# Patient Record
Sex: MALE | Employment: OTHER | URBAN - METROPOLITAN AREA
[De-identification: names, ages, dates, MRNs, and addresses within clinical notes are randomized per-mention and may not be internally consistent; named-entity substitution may affect disease eponyms.]

---

## 2022-11-09 ENCOUNTER — OFFICE VISIT (OUTPATIENT)
Dept: URGENT CARE | Facility: CLINIC | Age: 67
End: 2022-11-09

## 2022-11-09 DIAGNOSIS — M70.51 INFRAPATELLAR BURSITIS OF RIGHT KNEE: Primary | ICD-10-CM

## 2022-11-09 RX ORDER — PNV NO.95/FERROUS FUM/FOLIC AC 28MG-0.8MG
2 TABLET ORAL DAILY
COMMUNITY

## 2022-11-09 RX ORDER — SILDENAFIL 100 MG/1
TABLET, FILM COATED ORAL
COMMUNITY
Start: 2022-08-23

## 2022-11-09 RX ORDER — COLCHICINE 0.6 MG/1
TABLET ORAL
COMMUNITY
Start: 2022-08-31

## 2022-11-09 RX ORDER — UBIQUINOL 100 MG
2 CAPSULE ORAL DAILY
COMMUNITY

## 2022-11-09 RX ORDER — METAXALONE 800 MG/1
800 TABLET ORAL 3 TIMES DAILY
COMMUNITY

## 2022-11-09 RX ORDER — MELOXICAM 7.5 MG/1
7.5 TABLET ORAL DAILY
COMMUNITY

## 2022-11-09 RX ORDER — SODIUM PICOSULFATE, MAGNESIUM OXIDE, AND ANHYDROUS CITRIC ACID 10; 3.5; 12 MG/160ML; G/160ML; G/160ML
LIQUID ORAL
COMMUNITY
Start: 2022-11-04

## 2022-11-09 NOTE — PATIENT INSTRUCTIONS
Knee Bursitis   AMBULATORY CARE:   Knee bursitis  is inflammation of the bursa in your knee  The bursa is a fluid-filled sac that acts as a cushion between a bone and a tendon  A tendon is a cord of strong tissue that connects muscles to bones  Common signs and symptoms of knee bursitis:   Pain, swelling, or tenderness in your knee    Decreased movement or stiffness of your knee    Red, warm, skin over your knee    A grating or grinding sound or feeling when you move your knee    Call your doctor if:   Your pain and swelling increase  Your symptoms do not improve with treatment  You have a fever  You have questions or concerns about your condition or care  Treatment  may include any of the following:  Medicines:      NSAIDs , such as ibuprofen, help decrease swelling, pain, and fever  NSAIDs can cause stomach bleeding or kidney problems in certain people  If you take blood thinner medicine, always ask your healthcare provider if NSAIDs are safe for you  Always read the medicine label and follow directions  Aspirin  helps relieve pain and swelling  Take aspirin exactly as directed by your healthcare provider  Antibiotics  help fight an infection caused by bacteria  Steroids  help relieve pain and swelling  Steroid injections are given directly into the painful area  Steroid pills may be given for a short time  Surgery  may be used to remove your bursa  Surgery is only done when other treatments do not work  Manage your symptoms:   Rest your knee as much as possible to decrease pain and swelling  Slowly start to do more each day  Return to your daily activities as directed  Apply ice to help decrease swelling and pain  Ice may also help prevent tissue damage  Use an ice pack, or put crushed ice in a plastic bag  Cover it with a towel and place it on your knee for 15 to 20 minutes, 3 to 4 times each day, as directed  Apply heat to help decrease pain and stiffness    Apply heat on the area for 15 to 20 minutes, 3 to 4 times each day, as directed  Apply compression to decrease swelling  Healthcare providers may wrap your knee with tape or an elastic bandage to decrease swelling  Loosen the elastic bandage if you start to lose feeling in your toes  Elevate  your knee above the level of your heart as often as you can  This will help decrease swelling and pain  Prop your lower leg on pillows or blankets to keep it elevated comfortably  Do not put the pillow directly under your knee  Go to physical therapy, if directed  A physical therapist can teach you exercises to improve your range of motion and increase knee strength  Prevent knee bursitis:   Stretch, warm up, and cool down when you exercise  This will help loosen your muscles and decrease stress on your knees  Rest between workouts  Protect your knees  Use kneepads when you kneel on a hard surface and when you play sports  Stand and walk around every 20 minutes if you have to kneel for a long period of time  Follow up with your doctor as directed:  Write down your questions so you remember to ask them during your visits  © Copyright [employer] 2022 Information is for End User's use only and may not be sold, redistributed or otherwise used for commercial purposes  All illustrations and images included in CareNotes® are the copyrighted property of A D A M , Inc  or [facility] Mar[employer] Daniels   The above information is an  only  It is not intended as medical advice for individual conditions or treatments  Talk to your doctor, nurse or pharmacist before following any medical regimen to see if it is safe and effective for you

## 2022-11-09 NOTE — PROGRESS NOTES
[email or web address]    NAME: Donnie Cross is a 78 y o  male  : 1955    MRN: [id number]  DATE: 2022  TIME: 10:10 AM    Assessment and Plan   Infrapatellar bursitis of right knee [M70 51]  1  Infrapatellar bursitis of right knee         Patient Instructions   Patient Instructions     Knee Bursitis   AMBULATORY CARE:   Knee bursitis  is inflammation of the bursa in your knee  The bursa is a fluid-filled sac that acts as a cushion between a bone and a tendon  A tendon is a cord of strong tissue that connects muscles to bones  Common signs and symptoms of knee bursitis:   · Pain, swelling, or tenderness in your knee    · Decreased movement or stiffness of your knee    · Red, warm, skin over your knee    · A grating or grinding sound or feeling when you move your knee    Call your doctor if:   · Your pain and swelling increase  · Your symptoms do not improve with treatment  · You have a fever  · You have questions or concerns about your condition or care  Treatment  may include any of the followin  Medicines:      ? NSAIDs , such as ibuprofen, help decrease swelling, pain, and fever  NSAIDs can cause stomach bleeding or kidney problems in certain people  If you take blood thinner medicine, always ask your healthcare provider if NSAIDs are safe for you  Always read the medicine label and follow directions  ? Aspirin  helps relieve pain and swelling  Take aspirin exactly as directed by your healthcare provider  ? Antibiotics  help fight an infection caused by bacteria  ? Steroids  help relieve pain and swelling  Steroid injections are given directly into the painful area  Steroid pills may be given for a short time  2  Surgery  may be used to remove your bursa  Surgery is only done when other treatments do not work  Manage your symptoms:   · Rest your knee as much as possible to decrease pain and swelling  Slowly start to do more each day   Return to your daily activities as directed  · Apply ice to help decrease swelling and pain  Ice may also help prevent tissue damage  Use an ice pack, or put crushed ice in a plastic bag  Cover it with a towel and place it on your knee for 15 to 20 minutes, 3 to 4 times each day, as directed  · Apply heat to help decrease pain and stiffness  Apply heat on the area for 15 to 20 minutes, 3 to 4 times each day, as directed  · Apply compression to decrease swelling  Healthcare providers may wrap your knee with tape or an elastic bandage to decrease swelling  Loosen the elastic bandage if you start to lose feeling in your toes  · Elevate  your knee above the level of your heart as often as you can  This will help decrease swelling and pain  Prop your lower leg on pillows or blankets to keep it elevated comfortably  Do not put the pillow directly under your knee  · Go to physical therapy, if directed  A physical therapist can teach you exercises to improve your range of motion and increase knee strength  Prevent knee bursitis:   · Stretch, warm up, and cool down when you exercise  This will help loosen your muscles and decrease stress on your knees  Rest between workouts  · Protect your knees  Use kneepads when you kneel on a hard surface and when you play sports  Stand and walk around every 20 minutes if you have to kneel for a long period of time  Follow up with your doctor as directed:  Write down your questions so you remember to ask them during your visits  © Copyright [employer] 2022 Information is for End User's use only and may not be sold, redistributed or otherwise used for commercial purposes  All illustrations and images included in CareNotes® are the copyrighted property of [employer]  or Jose Antonio Daniels   The above information is an  only  It is not intended as medical advice for individual conditions or treatments   Talk to your doctor, nurse or pharmacist before following any medical regimen to see if it is safe and effective for you  Chief Complaint   No chief complaint on file  History of Present Illness   Corey Escoto presents to the clinic c/o  62-year-old male comes in with swelling pain limited range of motion of right knee that started on Sunday  Denies any known injury but says Saturday he did his regular workout, lots of yd work and then went and did a lot a walking at [employer] in [place] in  He had similar symptoms about a year ago and was treated with meloxicam   Says he does not like that medication and brought in for rested dispose of  No fever or chills  Taking 2 Advil every 4-6 hours as needed  Seems to be a little bit better since Monday  Review of Systems   Review of Systems   Constitutional: Positive for activity change  Negative for appetite change, chills and fever  Musculoskeletal: Positive for arthralgias, gait problem and joint swelling  Skin: Negative for color change         Current Medications     Long-Term Medications   Medication Sig Dispense Refill   • meloxicam (MOBIC) 7 5 mg tablet Take 7 5 mg by mouth daily     • sildenafil (VIAGRA) 100 mg tablet TAKE ONE-HALF TO ONE TABLET BY MOUTH 30 TO 60 MINUTES PRIOR TO SEX AS NEEDED     • colchicine (COLCRYS) 0 6 mg tablet TAKE 1 TABLET BY MOUTH 3 TIMES A DAY FOR 7 DAYS AS NEEDED FOR GOUT FLARE (Patient not taking: Reported on 11/9/2022)     • metaxalone (SKELAXIN) 800 mg tablet Take 800 mg by mouth Three times a day (Patient not taking: Reported on 11/9/2022)         Current Allergies     Allergies as of 11/09/2022 - Reviewed 11/09/2022   Allergen Reaction Noted   • Clindamycin GI Intolerance 02/17/2015          The following portions of the patient's history were reviewed and updated as appropriate: allergies, current medications, past family history, past medical history, past social history, past surgical history and problem list   Past Medical History:   Diagnosis Date   • Erectile dysfunction    • Knee pain, right    • Renal calculi      History reviewed  No pertinent surgical history  No family history on file  Objective   There were no vitals taken for this visit  No LMP for male patient  Physical Exam     Physical Exam  Vitals and nursing note reviewed  Constitutional:       General: He is not in acute distress  Appearance: Normal appearance  He is well-developed  He is not ill-appearing, toxic-appearing or diaphoretic  Cardiovascular:      Rate and Rhythm: Normal rate and regular rhythm  Pulmonary:      Effort: Pulmonary effort is normal    Musculoskeletal:         General: Swelling and tenderness present  No deformity or signs of injury  Right knee: Swelling and bony tenderness present  No deformity, effusion, erythema, ecchymosis, lacerations or crepitus  Normal range of motion  No tenderness  Normal alignment, normal meniscus and normal patellar mobility  Legs:       Comments: Swelling infrapatellar bursa region with mild TTP  No redness or heat  Pain increased with and flexion and and extension  Gait fairly normal    Skin:     General: Skin is warm and dry  Neurological:      Mental Status: He is alert and oriented to person, place, and time

## 2022-11-21 ENCOUNTER — OFFICE VISIT (OUTPATIENT)
Dept: URGENT CARE | Facility: CLINIC | Age: 67
End: 2022-11-21

## 2022-11-21 VITALS
OXYGEN SATURATION: 97 % | TEMPERATURE: 97.2 F | RESPIRATION RATE: 18 BRPM | SYSTOLIC BLOOD PRESSURE: 124 MMHG | DIASTOLIC BLOOD PRESSURE: 72 MMHG | HEART RATE: 72 BPM

## 2022-11-21 DIAGNOSIS — J01.00 ACUTE NON-RECURRENT MAXILLARY SINUSITIS: Primary | ICD-10-CM

## 2022-11-21 RX ORDER — PREDNISONE 20 MG/1
20 TABLET ORAL 2 TIMES DAILY WITH MEALS
Qty: 10 TABLET | Refills: 0 | Status: SHIPPED | OUTPATIENT
Start: 2022-11-21 | End: 2022-11-26

## 2022-11-21 NOTE — PROGRESS NOTES
[facility]        NAME: Christin Beltrán is a 78 y o  male  : 1955    MRN: [id number]  DATE: 2022  TIME: 10:32 AM    Assessment and Plan   Acute non-recurrent maxillary sinusitis [J01 00]  1  Acute non-recurrent maxillary sinusitis          May continue tylenol cold -   Take advil as needed for breakthough headache   Trial course of prednisone   F/u with pcp    Patient Instructions     Follow up with PCP in 3-5 days  Proceed to  ER if symptoms worsen  Chief Complaint     Chief Complaint   Patient presents with   • Cold Like Symptoms     Patient c/o stuffy nose and head  and HA for 6 days         History of Present Illness   Christin Beltrán presents to the clinic c/o    Cold Like Symptoms (Patient c/o stuffy nose and head  and HA for 5- 6 days)  Taking tylenol cold   Tylenol does not help with his headache at all   Does help a little with the congestion, but wears off and symptoms return  Super congested in the morning - after able to get it all out feels a little better but not as good as he would like  Review of Systems   Review of Systems   All other systems reviewed and are negative          Current Medications     Long-Term Medications   Medication Sig Dispense Refill   • meloxicam (MOBIC) 7 5 mg tablet Take 7 5 mg by mouth daily     • sildenafil (VIAGRA) 100 mg tablet TAKE ONE-HALF TO ONE TABLET BY MOUTH 30 TO 60 MINUTES PRIOR TO SEX AS NEEDED     • colchicine (COLCRYS) 0 6 mg tablet TAKE 1 TABLET BY MOUTH 3 TIMES A DAY FOR 7 DAYS AS NEEDED FOR GOUT FLARE (Patient not taking: Reported on 2022)     • metaxalone (SKELAXIN) 800 mg tablet Take 800 mg by mouth Three times a day (Patient not taking: Reported on 2022)         Current Allergies     Allergies as of 2022 - Reviewed 2022   Allergen Reaction Noted   • Clindamycin GI Intolerance 2015            The following portions of the patient's history were reviewed and updated as appropriate: allergies, current medications, past family history, past medical history, past social history, past surgical history and problem list     Objective   /72   Pulse 72   Temp (!) 97 2 °F (36 2 °C) (Tympanic)   Resp 18   SpO2 97%        Physical Exam     Physical Exam  Vitals and nursing note reviewed  Constitutional:       General: He is active  Vital signs are normal       Appearance: Normal appearance  He is well-developed and well-nourished  HENT:      Head: Normocephalic and atraumatic  Right Ear: Hearing, tympanic membrane, ear canal and external ear normal       Left Ear: Hearing, tympanic membrane, ear canal and external ear normal       Nose: Mucosal edema and congestion present  Right Sinus: No maxillary sinus tenderness or frontal sinus tenderness  Left Sinus: No maxillary sinus tenderness or frontal sinus tenderness  Mouth/Throat:      Mouth: Mucous membranes are moist    Eyes:      General: Lids are normal       Conjunctiva/sclera: Conjunctivae normal       Pupils: Pupils are equal, round, and reactive to light  Cardiovascular:      Rate and Rhythm: Normal rate and regular rhythm  Pulses: Normal pulses  Heart sounds: Normal heart sounds, S1 normal and S2 normal    Pulmonary:      Effort: Pulmonary effort is normal       Breath sounds: Normal breath sounds  Musculoskeletal:      Cervical back: Normal range of motion and neck supple  Skin:     General: Skin is warm, dry and intact  Neurological:      Mental Status: He is alert  Psychiatric:         Mood and Affect: Mood and affect normal          Speech: Speech normal          Behavior: Behavior normal          Thought Content:  Thought content normal          Cognition and Memory: Cognition and memory normal          Judgment: Judgment normal

## 2022-11-21 NOTE — PATIENT INSTRUCTIONS
Sinusitis   AMBULATORY CARE:   Sinusitis  is inflammation or infection of your sinuses  Sinusitis is most often caused by a virus  Acute sinusitis may last up to 12 weeks  Chronic sinusitis lasts longer than 12 weeks  Recurrent sinusitis means you have 4 or more infections in 1 year  Common signs and symptoms:   Fever    Pain, pressure, redness, or swelling around the forehead, cheeks, or eyes    Thick yellow or green discharge from your nose    Tenderness when you touch your face over your sinuses    Dry cough that happens mostly at night or when you lie down    Headache and face pain that is worse when you lean forward    Tooth pain, or pain when you chew    Seek care immediately if:   You have trouble breathing or wheezing that is getting worse  You have a stiff neck, a fever, or a bad headache  You cannot open your eye  Your eyeball bulges out or you cannot move your eye  You are more sleepy than normal, or you notice changes in your ability to think, move, or talk  You have swelling of your forehead or scalp  Call your doctor if:   You have vision changes, such as double vision  Your eye and eyelid are red, swollen, and painful  Your symptoms do not improve or go away after 10 days  You have nausea and are vomiting  Your nose is bleeding  You have questions or concerns about your condition or care  Medicines: Your symptoms may go away on their own  Your healthcare provider may recommend watchful waiting for up to 10 days before starting antibiotics  You may need any of the following:  Acetaminophen  decreases pain and fever  It is available without a doctor's order  Ask how much to take and how often to take it  Follow directions  Read the labels of all other medicines you are using to see if they also contain acetaminophen, or ask your doctor or pharmacist  Acetaminophen can cause liver damage if not taken correctly   Do not use more than 4 grams (4,000 milligrams) total of acetaminophen in one day  NSAIDs , such as ibuprofen, help decrease swelling, pain, and fever  This medicine is available with or without a doctor's order  NSAIDs can cause stomach bleeding or kidney problems in certain people  If you take blood thinner medicine, always ask your healthcare provider if NSAIDs are safe for you  Always read the medicine label and follow directions  Nasal steroid sprays  may help decrease inflammation in your nose and sinuses  Decongestants  help reduce swelling and drain mucus in the nose and sinuses  They may help you breathe easier  Antihistamines  help dry mucus in the nose and relieve sneezing  Self-care:   Rinse your sinuses as directed  Use a sinus rinse device to rinse your nasal passages with a saline (salt water) solution or distilled water  Do not use tap water  This will help thin the mucus in your nose and rinse away pollen and dirt  It will also help reduce swelling so you can breathe normally  Use a humidifier  to increase air moisture in your home  This may make it easier for you to breathe and help decrease your cough  Sleep with your head elevated  Place an extra pillow under your head before you go to sleep to help your sinuses drain  Drink liquids as directed  Ask your healthcare provider how much liquid to drink each day and which liquids are best for you  Liquids will thin the mucus in your nose and help it drain  Avoid drinks that contain alcohol or caffeine  Do not smoke, and avoid secondhand smoke  Nicotine and other chemicals in cigarettes and cigars can make your symptoms worse  Ask your healthcare provider for information if you currently smoke and need help to quit  E-cigarettes or smokeless tobacco still contain nicotine  Talk to your healthcare provider before you use these products  Prevent the spread of germs:   Wash your hands often with soap and water    Wash your hands after you use the bathroom, change a child's diaper, or sneeze  Wash your hands before you prepare or eat food  Stay away from people who are sick  Some germs spread easily and quickly through contact  Follow up with your doctor as directed: You may be referred to an ear, nose, and throat specialist  Write down your questions so you remember to ask them during your visits  © Copyright [employer] 2022 Information is for End User's use only and may not be sold, redistributed or otherwise used for commercial purposes  All illustrations and images included in CareNotes® are the copyrighted property of A D A M , Inc  or [address] [place] [address]  The above information is an  only  It is not intended as medical advice for individual conditions or treatments  Talk to your doctor, nurse or pharmacist before following any medical regimen to see if it is safe and effective for you

## 2023-04-25 ENCOUNTER — OFFICE VISIT (OUTPATIENT)
Dept: URGENT CARE | Facility: CLINIC | Age: 68
End: 2023-04-25

## 2023-04-25 VITALS
HEART RATE: 79 BPM | DIASTOLIC BLOOD PRESSURE: 90 MMHG | RESPIRATION RATE: 20 BRPM | OXYGEN SATURATION: 99 % | TEMPERATURE: 98.1 F | SYSTOLIC BLOOD PRESSURE: 122 MMHG

## 2023-04-25 DIAGNOSIS — M10.9 ACUTE GOUT OF RIGHT FOOT, UNSPECIFIED CAUSE: Primary | ICD-10-CM

## 2023-04-25 RX ORDER — COLCHICINE 0.6 MG/1
TABLET ORAL
Qty: 21 TABLET | Refills: 0 | Status: SHIPPED | OUTPATIENT
Start: 2023-04-25

## 2023-04-25 NOTE — PATIENT INSTRUCTIONS
Take medication as instructed  Follow-up with primary care if not improving over the next 3 to 5 days

## 2023-04-25 NOTE — PROGRESS NOTES
[email or web address]    NAME: Davida Elias is a 80 y o  male  : 1955    MRN: [id number]  DATE: 2023  TIME: 9:45 AM    Assessment and Plan   Acute gout of right foot, unspecified cause [M10 9]  1  Acute gout of right foot, unspecified cause  colchicine (COLCRYS) 0 6 mg tablet          Patient Instructions     Patient Instructions   Take medication as instructed  Follow-up with primary care if not improving over the next 3 to 5 days  Chief Complaint     Chief Complaint   Patient presents with   • Foot Pain     Pt C/O right foot pain that started Friday and non-injury related  History of Present Illness   Davida Elias presents to the clinic c/o  81-year-old male comes in with swelling pain right great toe foot that started on Friday  Recent vacation in [place] and had been drinking more alcohol than normal as well as eating seafood  Has been a few years since has had something like this in the past   Treated with colchicine and that seemed to be helpful  Does not have any longer  Review of Systems   Review of Systems   Constitutional: Positive for activity change  Negative for appetite change, chills and fever  Musculoskeletal: Positive for arthralgias, gait problem and joint swelling  Skin: Positive for color change         Current Medications     Long-Term Medications   Medication Sig Dispense Refill   • colchicine (COLCRYS) 0 6 mg tablet One tid prn gout 21 tablet 0   • meloxicam (MOBIC) 7 5 mg tablet Take 7 5 mg by mouth daily     • sildenafil (VIAGRA) 100 mg tablet TAKE ONE-HALF TO ONE TABLET BY MOUTH 30 TO 60 MINUTES PRIOR TO SEX AS NEEDED     • Cholecalciferol 50 MCG ( UT) CAPS Take 1 capsule by mouth     • colchicine (COLCRYS) 0 6 mg tablet TAKE 1 TABLET BY MOUTH 3 TIMES A DAY FOR 7 DAYS AS NEEDED FOR GOUT FLARE (Patient not taking: Reported on 2022)     • metaxalone (SKELAXIN) 800 mg tablet Take 800 mg by mouth Three times a day (Patient not taking: Reported on 11/9/2022)         Current Allergies     Allergies as of 04/25/2023 - Reviewed 04/25/2023   Allergen Reaction Noted   • Clindamycin GI Intolerance 02/17/2015          The following portions of the patient's history were reviewed and updated as appropriate: allergies, current medications, past family history, past medical history, past social history, past surgical history and problem list   Past Medical History:   Diagnosis Date   • Erectile dysfunction    • Knee pain, right    • Renal calculi      History reviewed  No pertinent surgical history  History reviewed  No pertinent family history  Objective   /90 (BP Location: Right arm, Patient Position: Sitting, Cuff Size: Large)   Pulse 79   Temp 98 1 °F (36 7 °C) (Tympanic)   Resp 20   SpO2 99%   No LMP for male patient  Physical Exam     Physical Exam  Vitals and nursing note reviewed  Constitutional:       General: He is not in acute distress  Appearance: He is well-developed  He is not ill-appearing, toxic-appearing or diaphoretic  Comments: Antalgic gait   Cardiovascular:      Rate and Rhythm: Normal rate and regular rhythm  Pulmonary:      Effort: Pulmonary effort is normal    Musculoskeletal:         General: Swelling and tenderness present  No signs of injury  Comments: TTP right first MTP joint  Consistent with gout  Skin:     General: Skin is warm and dry  Findings: Erythema present  Comments: Redness around right great big toe and foot  Neurological:      Mental Status: He is alert and oriented to person, place, and time     Psychiatric:         Mood and Affect: Mood normal          Behavior: Behavior normal

## 2024-02-19 ENCOUNTER — APPOINTMENT (EMERGENCY)
Dept: RADIOLOGY | Facility: HOSPITAL | Age: 69
DRG: 322 | End: 2024-02-19
Payer: MEDICARE

## 2024-02-19 ENCOUNTER — HOSPITAL ENCOUNTER (INPATIENT)
Facility: HOSPITAL | Age: 69
LOS: 1 days | Discharge: HOME | DRG: 322 | End: 2024-02-20
Attending: EMERGENCY MEDICINE | Admitting: INTERNAL MEDICINE
Payer: MEDICARE

## 2024-02-19 ENCOUNTER — APPOINTMENT (INPATIENT)
Dept: NON INVASIVE DIAGNOSTICS | Facility: HOSPITAL | Age: 69
DRG: 322 | End: 2024-02-19
Payer: MEDICARE

## 2024-02-19 DIAGNOSIS — R07.9 CHEST PAIN: ICD-10-CM

## 2024-02-19 DIAGNOSIS — I21.3 STEMI (ST ELEVATION MYOCARDIAL INFARCTION) (HCC): Primary | ICD-10-CM

## 2024-02-19 PROBLEM — E78.5 HYPERLIPIDEMIA: Status: ACTIVE | Noted: 2024-02-19

## 2024-02-19 PROBLEM — I25.10 CORONARY ARTERY DISEASE INVOLVING NATIVE CORONARY ARTERY: Status: ACTIVE | Noted: 2024-02-19

## 2024-02-19 PROBLEM — Z95.5 STATUS POST INSERTION OF DRUG ELUTING CORONARY ARTERY STENT: Status: ACTIVE | Noted: 2024-02-19

## 2024-02-19 LAB
ALBUMIN SERPL BCP-MCNC: 4.2 G/DL (ref 3.5–5)
ALP SERPL-CCNC: 55 U/L (ref 34–104)
ALT SERPL W P-5'-P-CCNC: 24 U/L (ref 7–52)
ANION GAP SERPL CALCULATED.3IONS-SCNC: 6 MMOL/L
AORTIC ROOT: 3.8 CM
AORTIC VALVE MEAN VELOCITY: 9 M/S
APICAL FOUR CHAMBER EJECTION FRACTION: 65 %
APTT PPP: 26 SECONDS (ref 23–37)
ASCENDING AORTA: 3.9 CM
AST SERPL W P-5'-P-CCNC: 27 U/L (ref 13–39)
ATRIAL RATE: 52 BPM
AV AREA BY CONTINUOUS VTI: 3 CM2
AV AREA PEAK VELOCITY: 3 CM2
AV LVOT MEAN GRADIENT: 2 MMHG
AV LVOT PEAK GRADIENT: 4 MMHG
AV MEAN GRADIENT: 4 MMHG
AV PEAK GRADIENT: 7 MMHG
AV VALVE AREA: 3 CM2
AV VELOCITY RATIO: 0.72
BASOPHILS # BLD AUTO: 0.05 THOUSANDS/ÂΜL (ref 0–0.1)
BASOPHILS NFR BLD AUTO: 1 % (ref 0–1)
BILIRUB DIRECT SERPL-MCNC: 0.08 MG/DL (ref 0–0.2)
BILIRUB SERPL-MCNC: 0.52 MG/DL (ref 0.2–1)
BSA FOR ECHO PROCEDURE: 2.37 M2
BUN SERPL-MCNC: 17 MG/DL (ref 5–25)
CALCIUM SERPL-MCNC: 8.9 MG/DL (ref 8.4–10.2)
CARDIAC TROPONIN I PNL SERPL HS: 18 NG/L
CHLORIDE SERPL-SCNC: 104 MMOL/L (ref 96–108)
CO2 SERPL-SCNC: 31 MMOL/L (ref 21–32)
CREAT SERPL-MCNC: 1.16 MG/DL (ref 0.6–1.3)
DOP CALC AO PEAK VEL: 1.32 M/S
DOP CALC AO VTI: 29.43 CM
DOP CALC LVOT AREA: 4.15 CM2
DOP CALC LVOT CARDIAC INDEX: 2.32 L/MIN/M2
DOP CALC LVOT CARDIAC OUTPUT: 5.5 L/MIN
DOP CALC LVOT DIAMETER: 2.3 CM
DOP CALC LVOT PEAK VEL VTI: 21.26 CM
DOP CALC LVOT PEAK VEL: 0.95 M/S
DOP CALC LVOT STROKE INDEX: 38.4 ML/M2
DOP CALC LVOT STROKE VOLUME: 88.29
E WAVE DECELERATION TIME: 234 MS
E/A RATIO: 1.08
EOSINOPHIL # BLD AUTO: 0.15 THOUSAND/ÂΜL (ref 0–0.61)
EOSINOPHIL NFR BLD AUTO: 2 % (ref 0–6)
ERYTHROCYTE [DISTWIDTH] IN BLOOD BY AUTOMATED COUNT: 13.7 % (ref 11.6–15.1)
FRACTIONAL SHORTENING: 16 (ref 28–44)
GFR SERPL CREATININE-BSD FRML MDRD: 64 ML/MIN/1.73SQ M
GLUCOSE SERPL-MCNC: 153 MG/DL (ref 65–140)
HCT VFR BLD AUTO: 47 % (ref 36.5–49.3)
HGB BLD-MCNC: 16 G/DL (ref 12–17)
IMM GRANULOCYTES # BLD AUTO: 0.03 THOUSAND/UL (ref 0–0.2)
IMM GRANULOCYTES NFR BLD AUTO: 0 % (ref 0–2)
INR PPP: 0.93 (ref 0.84–1.19)
INTERVENTRICULAR SEPTUM IN DIASTOLE (PARASTERNAL SHORT AXIS VIEW): 1.5 CM
INTERVENTRICULAR SEPTUM: 1.5 CM (ref 0.6–1.1)
IVC: 2.4 MM
KCT BLD-ACNC: 283 SEC (ref 89–137)
LAAS-AP2: 20.3 CM2
LAAS-AP4: 22.3 CM2
LEFT ATRIUM SIZE: 3.4 CM
LEFT ATRIUM VOLUME (MOD BIPLANE): 64 ML
LEFT ATRIUM VOLUME INDEX (MOD BIPLANE): 27 ML/M2
LEFT INTERNAL DIMENSION IN SYSTOLE: 4.1 CM (ref 2.1–4)
LEFT VENTRICULAR INTERNAL DIMENSION IN DIASTOLE: 4.9 CM (ref 3.5–6)
LEFT VENTRICULAR POSTERIOR WALL IN END DIASTOLE: 1.5 CM
LEFT VENTRICULAR STROKE VOLUME: 39 ML
LVSV (TEICH): 39 ML
LYMPHOCYTES # BLD AUTO: 3.42 THOUSANDS/ÂΜL (ref 0.6–4.47)
LYMPHOCYTES NFR BLD AUTO: 36 % (ref 14–44)
MCH RBC QN AUTO: 31.6 PG (ref 26.8–34.3)
MCHC RBC AUTO-ENTMCNC: 34 G/DL (ref 31.4–37.4)
MCV RBC AUTO: 93 FL (ref 82–98)
MONOCYTES # BLD AUTO: 0.69 THOUSAND/ÂΜL (ref 0.17–1.22)
MONOCYTES NFR BLD AUTO: 7 % (ref 4–12)
MV E'TISSUE VEL-LAT: 12 CM/S
MV E'TISSUE VEL-SEP: 8 CM/S
MV PEAK A VEL: 0.48 M/S
MV PEAK E VEL: 52 CM/S
NEUTROPHILS # BLD AUTO: 5.28 THOUSANDS/ÂΜL (ref 1.85–7.62)
NEUTS SEG NFR BLD AUTO: 54 % (ref 43–75)
NRBC BLD AUTO-RTO: 0 /100 WBCS
P AXIS: 50 DEGREES
PLATELET # BLD AUTO: 211 THOUSANDS/UL (ref 149–390)
PMV BLD AUTO: 10.5 FL (ref 8.9–12.7)
POTASSIUM SERPL-SCNC: 4.1 MMOL/L (ref 3.5–5.3)
PR INTERVAL: 208 MS
PROT SERPL-MCNC: 6.9 G/DL (ref 6.4–8.4)
PROTHROMBIN TIME: 12.6 SECONDS (ref 11.6–14.5)
QRS AXIS: -18 DEGREES
QRSD INTERVAL: 104 MS
QT INTERVAL: 450 MS
QTC INTERVAL: 418 MS
RA PRESSURE ESTIMATED: 15 MMHG
RBC # BLD AUTO: 5.07 MILLION/UL (ref 3.88–5.62)
RIGHT ATRIUM AREA SYSTOLE A4C: 19.7 CM2
RIGHT VENTRICLE ID DIMENSION: 4.2 CM
RV PSP: 54 MMHG
SINOTUBULAR JUNCTION: 3.2 CM
SL CV LEFT ATRIUM LENGTH A2C: 5.6 CM
SL CV LV EF: 65
SL CV PED ECHO LEFT VENTRICLE DIASTOLIC VOLUME (MOD BIPLANE) 2D: 114 ML
SL CV PED ECHO LEFT VENTRICLE SYSTOLIC VOLUME (MOD BIPLANE) 2D: 74 ML
SODIUM SERPL-SCNC: 141 MMOL/L (ref 135–147)
SPECIMEN SOURCE: ABNORMAL
STJ: 3.2 CM
T WAVE AXIS: 87 DEGREES
TR MAX PG: 39 MMHG
TR PEAK VELOCITY: 3.1 M/S
TRICUSPID ANNULAR PLANE SYSTOLIC EXCURSION: 2.2 CM
TRICUSPID VALVE PEAK REGURGITATION VELOCITY: 3.1 M/S
VENTRICULAR RATE: 52 BPM
WBC # BLD AUTO: 9.62 THOUSAND/UL (ref 4.31–10.16)

## 2024-02-19 PROCEDURE — C1887 CATHETER, GUIDING: HCPCS | Performed by: INTERNAL MEDICINE

## 2024-02-19 PROCEDURE — 84484 ASSAY OF TROPONIN QUANT: CPT | Performed by: EMERGENCY MEDICINE

## 2024-02-19 PROCEDURE — C1769 GUIDE WIRE: HCPCS | Performed by: INTERNAL MEDICINE

## 2024-02-19 PROCEDURE — 93454 CORONARY ARTERY ANGIO S&I: CPT | Performed by: INTERNAL MEDICINE

## 2024-02-19 PROCEDURE — 96376 TX/PRO/DX INJ SAME DRUG ADON: CPT

## 2024-02-19 PROCEDURE — C1894 INTRO/SHEATH, NON-LASER: HCPCS | Performed by: INTERNAL MEDICINE

## 2024-02-19 PROCEDURE — 99223 1ST HOSP IP/OBS HIGH 75: CPT | Performed by: INTERNAL MEDICINE

## 2024-02-19 PROCEDURE — C1725 CATH, TRANSLUMIN NON-LASER: HCPCS | Performed by: INTERNAL MEDICINE

## 2024-02-19 PROCEDURE — 99152 MOD SED SAME PHYS/QHP 5/>YRS: CPT | Performed by: INTERNAL MEDICINE

## 2024-02-19 PROCEDURE — NC001 PR NO CHARGE: Performed by: INTERNAL MEDICINE

## 2024-02-19 PROCEDURE — 80048 BASIC METABOLIC PNL TOTAL CA: CPT | Performed by: EMERGENCY MEDICINE

## 2024-02-19 PROCEDURE — 93306 TTE W/DOPPLER COMPLETE: CPT | Performed by: INTERNAL MEDICINE

## 2024-02-19 PROCEDURE — 92941 PRQ TRLML REVSC TOT OCCL AMI: CPT | Performed by: INTERNAL MEDICINE

## 2024-02-19 PROCEDURE — C9606 PERC D-E COR REVASC W AMI S: HCPCS | Performed by: INTERNAL MEDICINE

## 2024-02-19 PROCEDURE — 99153 MOD SED SAME PHYS/QHP EA: CPT | Performed by: INTERNAL MEDICINE

## 2024-02-19 PROCEDURE — 96365 THER/PROPH/DIAG IV INF INIT: CPT

## 2024-02-19 PROCEDURE — 93005 ELECTROCARDIOGRAM TRACING: CPT

## 2024-02-19 PROCEDURE — 36415 COLL VENOUS BLD VENIPUNCTURE: CPT | Performed by: EMERGENCY MEDICINE

## 2024-02-19 PROCEDURE — C1874 STENT, COATED/COV W/DEL SYS: HCPCS | Performed by: INTERNAL MEDICINE

## 2024-02-19 PROCEDURE — 80076 HEPATIC FUNCTION PANEL: CPT | Performed by: EMERGENCY MEDICINE

## 2024-02-19 PROCEDURE — 027034Z DILATION OF CORONARY ARTERY, ONE ARTERY WITH DRUG-ELUTING INTRALUMINAL DEVICE, PERCUTANEOUS APPROACH: ICD-10-PCS | Performed by: INTERNAL MEDICINE

## 2024-02-19 PROCEDURE — 85730 THROMBOPLASTIN TIME PARTIAL: CPT | Performed by: EMERGENCY MEDICINE

## 2024-02-19 PROCEDURE — 71045 X-RAY EXAM CHEST 1 VIEW: CPT

## 2024-02-19 PROCEDURE — 99285 EMERGENCY DEPT VISIT HI MDM: CPT | Performed by: EMERGENCY MEDICINE

## 2024-02-19 PROCEDURE — 85347 COAGULATION TIME ACTIVATED: CPT

## 2024-02-19 PROCEDURE — 99285 EMERGENCY DEPT VISIT HI MDM: CPT

## 2024-02-19 PROCEDURE — B2111ZZ FLUOROSCOPY OF MULTIPLE CORONARY ARTERIES USING LOW OSMOLAR CONTRAST: ICD-10-PCS | Performed by: INTERNAL MEDICINE

## 2024-02-19 PROCEDURE — 93010 ELECTROCARDIOGRAM REPORT: CPT

## 2024-02-19 PROCEDURE — 96375 TX/PRO/DX INJ NEW DRUG ADDON: CPT

## 2024-02-19 PROCEDURE — 85610 PROTHROMBIN TIME: CPT | Performed by: EMERGENCY MEDICINE

## 2024-02-19 PROCEDURE — C1757 CATH, THROMBECTOMY/EMBOLECT: HCPCS | Performed by: INTERNAL MEDICINE

## 2024-02-19 PROCEDURE — 93306 TTE W/DOPPLER COMPLETE: CPT

## 2024-02-19 PROCEDURE — 85025 COMPLETE CBC W/AUTO DIFF WBC: CPT | Performed by: EMERGENCY MEDICINE

## 2024-02-19 PROCEDURE — 99222 1ST HOSP IP/OBS MODERATE 55: CPT | Performed by: INTERNAL MEDICINE

## 2024-02-19 DEVICE — STENT ONYXNG35018UX ONYX 3.50X18RX
Type: IMPLANTABLE DEVICE | Status: FUNCTIONAL
Brand: ONYX FRONTIER™

## 2024-02-19 RX ORDER — HEPARIN SODIUM 1000 [USP'U]/ML
2000 INJECTION, SOLUTION INTRAVENOUS; SUBCUTANEOUS EVERY 6 HOURS PRN
Status: DISCONTINUED | OUTPATIENT
Start: 2024-02-19 | End: 2024-02-19

## 2024-02-19 RX ORDER — ASPIRIN 325 MG
325 TABLET ORAL ONCE
Status: COMPLETED | OUTPATIENT
Start: 2024-02-19 | End: 2024-02-19

## 2024-02-19 RX ORDER — CHLORHEXIDINE GLUCONATE ORAL RINSE 1.2 MG/ML
15 SOLUTION DENTAL EVERY 12 HOURS SCHEDULED
Status: DISCONTINUED | OUTPATIENT
Start: 2024-02-19 | End: 2024-02-20 | Stop reason: HOSPADM

## 2024-02-19 RX ORDER — NITROGLYCERIN 0.4 MG/1
0.4 TABLET SUBLINGUAL
Status: DISCONTINUED | OUTPATIENT
Start: 2024-02-19 | End: 2024-02-20 | Stop reason: HOSPADM

## 2024-02-19 RX ORDER — SODIUM CHLORIDE 9 MG/ML
3 INJECTION INTRAVENOUS
Status: DISCONTINUED | OUTPATIENT
Start: 2024-02-19 | End: 2024-02-20 | Stop reason: HOSPADM

## 2024-02-19 RX ORDER — ASPIRIN 81 MG/1
81 TABLET, CHEWABLE ORAL DAILY
Status: DISCONTINUED | OUTPATIENT
Start: 2024-02-20 | End: 2024-02-20 | Stop reason: HOSPADM

## 2024-02-19 RX ORDER — HEPARIN SODIUM 10000 [USP'U]/100ML
3-20 INJECTION, SOLUTION INTRAVENOUS
Status: DISCONTINUED | OUTPATIENT
Start: 2024-02-19 | End: 2024-02-19

## 2024-02-19 RX ORDER — NITROGLYCERIN 20 MG/100ML
INJECTION INTRAVENOUS CODE/TRAUMA/SEDATION MEDICATION
Status: DISCONTINUED | OUTPATIENT
Start: 2024-02-19 | End: 2024-02-19 | Stop reason: HOSPADM

## 2024-02-19 RX ORDER — MORPHINE SULFATE 4 MG/ML
4 INJECTION, SOLUTION INTRAMUSCULAR; INTRAVENOUS ONCE
Status: COMPLETED | OUTPATIENT
Start: 2024-02-19 | End: 2024-02-19

## 2024-02-19 RX ORDER — ATORVASTATIN CALCIUM 80 MG/1
80 TABLET, FILM COATED ORAL
Status: DISCONTINUED | OUTPATIENT
Start: 2024-02-19 | End: 2024-02-20 | Stop reason: HOSPADM

## 2024-02-19 RX ORDER — SODIUM CHLORIDE 9 MG/ML
100 INJECTION, SOLUTION INTRAVENOUS CONTINUOUS
Status: DISCONTINUED | OUTPATIENT
Start: 2024-02-19 | End: 2024-02-19

## 2024-02-19 RX ORDER — GLUCOSAMINE/D3/BOSWELLIA SERRA 1500MG-400
TABLET ORAL
COMMUNITY

## 2024-02-19 RX ORDER — ACETAMINOPHEN 325 MG/1
650 TABLET ORAL EVERY 4 HOURS PRN
Status: DISCONTINUED | OUTPATIENT
Start: 2024-02-19 | End: 2024-02-20 | Stop reason: HOSPADM

## 2024-02-19 RX ORDER — HEPARIN SODIUM 1000 [USP'U]/ML
4000 INJECTION, SOLUTION INTRAVENOUS; SUBCUTANEOUS EVERY 6 HOURS PRN
Status: DISCONTINUED | OUTPATIENT
Start: 2024-02-19 | End: 2024-02-19

## 2024-02-19 RX ORDER — SODIUM CHLORIDE 9 MG/ML
75 INJECTION, SOLUTION INTRAVENOUS CONTINUOUS
Status: DISCONTINUED | OUTPATIENT
Start: 2024-02-19 | End: 2024-02-19

## 2024-02-19 RX ORDER — MIDAZOLAM HYDROCHLORIDE 2 MG/2ML
INJECTION, SOLUTION INTRAMUSCULAR; INTRAVENOUS CODE/TRAUMA/SEDATION MEDICATION
Status: DISCONTINUED | OUTPATIENT
Start: 2024-02-19 | End: 2024-02-19 | Stop reason: HOSPADM

## 2024-02-19 RX ORDER — VERAPAMIL HYDROCHLORIDE 2.5 MG/ML
INJECTION, SOLUTION INTRAVENOUS CODE/TRAUMA/SEDATION MEDICATION
Status: DISCONTINUED | OUTPATIENT
Start: 2024-02-19 | End: 2024-02-19 | Stop reason: HOSPADM

## 2024-02-19 RX ORDER — HEPARIN SODIUM 5000 [USP'U]/ML
5000 INJECTION, SOLUTION INTRAVENOUS; SUBCUTANEOUS EVERY 8 HOURS SCHEDULED
Status: DISCONTINUED | OUTPATIENT
Start: 2024-02-19 | End: 2024-02-20 | Stop reason: HOSPADM

## 2024-02-19 RX ORDER — FENTANYL CITRATE 50 UG/ML
INJECTION, SOLUTION INTRAMUSCULAR; INTRAVENOUS CODE/TRAUMA/SEDATION MEDICATION
Status: DISCONTINUED | OUTPATIENT
Start: 2024-02-19 | End: 2024-02-19 | Stop reason: HOSPADM

## 2024-02-19 RX ORDER — HEPARIN SODIUM 1000 [USP'U]/ML
4000 INJECTION, SOLUTION INTRAVENOUS; SUBCUTANEOUS ONCE
Status: COMPLETED | OUTPATIENT
Start: 2024-02-19 | End: 2024-02-19

## 2024-02-19 RX ORDER — HEPARIN SODIUM 1000 [USP'U]/ML
INJECTION, SOLUTION INTRAVENOUS; SUBCUTANEOUS CODE/TRAUMA/SEDATION MEDICATION
Status: DISCONTINUED | OUTPATIENT
Start: 2024-02-19 | End: 2024-02-19 | Stop reason: HOSPADM

## 2024-02-19 RX ADMIN — HEPARIN SODIUM 5000 UNITS: 5000 INJECTION INTRAVENOUS; SUBCUTANEOUS at 22:15

## 2024-02-19 RX ADMIN — HEPARIN SODIUM 5000 UNITS: 5000 INJECTION INTRAVENOUS; SUBCUTANEOUS at 13:36

## 2024-02-19 RX ADMIN — HEPARIN SODIUM 4000 UNITS: 1000 INJECTION INTRAVENOUS; SUBCUTANEOUS at 08:54

## 2024-02-19 RX ADMIN — CHLORHEXIDINE GLUCONATE 15 ML: 1.2 RINSE ORAL at 20:42

## 2024-02-19 RX ADMIN — SODIUM CHLORIDE 75 ML/HR: 0.9 INJECTION, SOLUTION INTRAVENOUS at 08:54

## 2024-02-19 RX ADMIN — TICAGRELOR 90 MG: 90 TABLET ORAL at 20:43

## 2024-02-19 RX ADMIN — ACETAMINOPHEN 325MG 650 MG: 325 TABLET ORAL at 14:41

## 2024-02-19 RX ADMIN — ATORVASTATIN CALCIUM 80 MG: 80 TABLET, FILM COATED ORAL at 17:17

## 2024-02-19 RX ADMIN — ASPIRIN 325 MG ORAL TABLET 325 MG: 325 PILL ORAL at 08:38

## 2024-02-19 RX ADMIN — TICAGRELOR 180 MG: 90 TABLET ORAL at 08:56

## 2024-02-19 RX ADMIN — HEPARIN SODIUM 11.1 UNITS/KG/HR: 10000 INJECTION, SOLUTION INTRAVENOUS at 09:02

## 2024-02-19 RX ADMIN — MORPHINE SULFATE 4 MG: 4 INJECTION INTRAVENOUS at 08:55

## 2024-02-19 NOTE — ASSESSMENT & PLAN NOTE
As above STEMI  DAPT, statin, consider starting beta-blocker  Continue dual antiplatelets and follow-up labs  Discussed lifestyle changes

## 2024-02-19 NOTE — QUICK NOTE
Singh check Brilinta 90 mg twice daily #60 with 11 refills.  Cost is $435.00 for the first month until deductible is reached.   Then cost if 25% of drug cost.   Patient and wife are agreeable with continuing the medication at discharge

## 2024-02-19 NOTE — PROGRESS NOTES
02/19/24 1300   Clinical Encounter Type   Visited With Patient and family together   Routine Visit Introduction   Continue Visiting Yes   Sacramental Encounters   Communion Given Indicator Yes

## 2024-02-19 NOTE — H&P
Former[facility]  H&P  Name: Jerome Coffey 68 y.o. male I MRN: [id number]  Unit/Bed#: ICU 15 I Date of Admission: 2/19/2024   Date of Service: 2/19/2024 I Hospital Day: 0      Assessment/Plan   * STEMI (ST elevation myocardial infarction) S/P BEAN  Assessment & Plan  Patient presented with left shoulder and arm pain with associated nausea and diaphoresis 45 minutes prior to presentation  The ED, EKG ST elevation in the inferior leads  STEMI alert-loaded with aspirin and Brilinta/heparin drip started on taken to the Cath Lab.  Patient underwent cardiac cath which showed 100% mid RCA stenosis status post stent x1  Currently in the ICU for hemodynamic monitoring    Plan   Continue dual antiplatelets  Atorvastatin 80 mg  Follow-up lipid panel  Hold beta-blocker for now given bradycardia  Follow-up formal echo read        Hyperlipidemia  Assessment & Plan  Continue atorvastatin 80 mg  Follow-up updated lipid panel    Coronary artery disease involving native coronary artery  Assessment & Plan  As above STEMI  Continue dual antiplatelets and follow-up labs  Discussed lifestyle changes        ----------------------------------------------------------------------------------------  HPI/24hr events:   Jerome Coffey is a 68-year-old male with past medical history of dyslipidemia presented left shoulder and arm pain 45 minutes prior to admission.  Was previously well. He had associated chest pain ,lightheadedness diaphoresis and nausea but without  shortness of breath.  No GI symptoms.  At the ED, patient bradycardic but hemodynamically stable.  EKG ST elevation in the inferior leads, troponin's normal  Patient was MR alert, was loaded aspirin, Brilinta started on heparin drip.  Patient underwent EKG Which showed 100% mid RCA stenosis, stent x1 placed.  Patient admitted to ICU for post MI cardiac cath monitoring      Patient appropriate for transfer out of the ICU today?: No  Disposition: Admit to Critical  "Care   Code Status: Level 1 - Full Code  ---------------------------------------------------------------------------------------      Review of Systems   Constitutional:  Negative for chills and fever.   HENT:  Negative for ear pain and sore throat.    Eyes:  Negative for pain and visual disturbance.   Respiratory:  Negative for cough and shortness of breath.    Cardiovascular:  Negative for chest pain and palpitations.   Gastrointestinal:  Negative for abdominal pain and vomiting.   Genitourinary:  Negative for dysuria and hematuria.   Musculoskeletal:  Negative for arthralgias and back pain.   Skin:  Negative for color change and rash.   Neurological:  Negative for seizures and syncope.   All other systems reviewed and are negative.    Review of systems was reviewed and negative unless stated above in HPI/24-hour events   ---------------------------------------------------------------------------------------  OBJECTIVE    Vitals   Vitals:    24 1145 24 1230 24 1300 24 1330   BP: 123/81      BP Location:       Pulse: 63 64 60 66   Resp:  (!) 27 17 22   Temp:       TempSrc:       SpO2:  98% 98% 97%   Weight: 114 kg (251 lb)      Height: 6' 1\" (1.854 m)        Temp (24hrs), Av.6 °F (36.4 °C), Min:97.5 °F (36.4 °C), Max:97.7 °F (36.5 °C)  Current: Temperature: 97.5 °F (36.4 °C)          Respiratory:  SpO2: SpO2: 97 %       Invasive/non-invasive ventilation settings   Respiratory      Lab Data (Last 4 hours)      None           O2/Vent Data (Last 4 hours)      None                    Physical Exam  Vitals and nursing note reviewed.   Constitutional:       General: He is not in acute distress.     Appearance: He is well-developed.   HENT:      Head: Normocephalic and atraumatic.   Eyes:      Conjunctiva/sclera: Conjunctivae normal.   Cardiovascular:      Rate and Rhythm: Normal rate and regular rhythm.      Heart sounds: No murmur heard.  Pulmonary:      Effort: Pulmonary effort is normal. No " "respiratory distress.      Breath sounds: Normal breath sounds.   Abdominal:      Palpations: Abdomen is soft.      Tenderness: There is no abdominal tenderness.   Musculoskeletal:         General: No swelling.      Cervical back: Neck supple.   Skin:     General: Skin is warm and dry.      Capillary Refill: Capillary refill takes less than 2 seconds.   Neurological:      Mental Status: He is alert.   Psychiatric:         Mood and Affect: Mood normal.             Laboratory and Diagnostics:  Results from last 7 days   Lab Units 02/19/24  0909   WBC Thousand/uL 9.62   HEMOGLOBIN g/dL 16.0   HEMATOCRIT % 47.0   PLATELETS Thousands/uL 211   NEUTROS PCT % 54   MONOS PCT % 7   EOS PCT % 2     Results from last 7 days   Lab Units 02/19/24  0837   SODIUM mmol/L 141   POTASSIUM mmol/L 4.1   CHLORIDE mmol/L 104   CO2 mmol/L 31   ANION GAP mmol/L 6   BUN mg/dL 17   CREATININE mg/dL 1.16   CALCIUM mg/dL 8.9   GLUCOSE RANDOM mg/dL 153*   ALT U/L 24   AST U/L 27   ALK PHOS U/L 55   ALBUMIN g/dL 4.2   TOTAL BILIRUBIN mg/dL 0.52          Results from last 7 days   Lab Units 02/19/24  0837   INR  0.93   PTT seconds 26              ABG:    VBG:          Micro        EKG: ST elevation in inferior leads  Imaging:  Echocardiogram pending    Intake and Output  I/O       None            Height and Weights   Height: 6' 1\" (185.4 cm)  IBW (Ideal Body Weight): 79.9 kg  Body mass index is 33.12 kg/m².  Weight (last 2 days)       Date/Time Weight    02/19/24 1145 114 (251)    02/19/24 1041 114 (251.32)    02/19/24 09:06:03 116 (256.62)    02/19/24 0835 116 (256.62)              Nutrition       Diet Orders   (From admission, onward)                 Start     Ordered    02/19/24 0935  Diet Cardiovascular; Cardiac  Diet effective now        References:    Adult Nutrition Support Algorithm    RD Therapeutic Diet Order Protocol   Question Answer Comment   Diet Type Cardiovascular    Cardiac Cardiac    RD to adjust diet per protocol? Yes        " "02/19/24 0936                      Active Medications  Scheduled Meds:  Current Facility-Administered Medications   Medication Dose Route Frequency Provider Last Rate    acetaminophen  650 mg Oral Q4H PRN Brandi Shaynacsek, CRNP      [START ON 2/20/2024] aspirin  81 mg Oral Daily Brandi Grecsek, CRNP      atorvastatin  80 mg Oral Daily With Dinner Brandi Grecsek, CRNP      heparin (porcine)  5,000 Units Subcutaneous Q8H CHIN Brandi Grecsek, CRNP      nitroglycerin  0.4 mg Sublingual Q5 Min PRN Brandi Grecsek, CRNP      sodium chloride (PF)  3 mL Intravenous Q1H PRN Brandi Grecsek, CRNP      sodium chloride  100 mL/hr Intravenous Continuous Brandi Grecsek, CRNP      ticagrelor  90 mg Oral Q12H CHIN Brandi Grecsek, CRNP       Continuous Infusions:  sodium chloride, 100 mL/hr      PRN Meds:   acetaminophen, 650 mg, Q4H PRN  nitroglycerin, 0.4 mg, Q5 Min PRN  sodium chloride (PF), 3 mL, Q1H PRN        Invasive Devices Review  Invasive Devices       Peripheral Intravenous Line  Duration             Peripheral IV 02/19/24 Left;Ventral (anterior) Forearm <1 day    Peripheral IV 02/19/24 Right;Ventral (anterior) Hand <1 day                    Rationale for remaining devices: monitoring   ---------------------------------------------------------------------------------------  Advance Directive and Living Will:      Power of :    POLST:    ---------------------------------------------------------------------------------------        Luz Maria Conklin MD      Portions of the record may have been created with voice recognition software.  Occasional wrong word or \"sound a like\" substitutions may have occurred due to the inherent limitations of voice recognition software.  Read the chart carefully and recognize, using context, where substitutions have occurred       "

## 2024-02-19 NOTE — ASSESSMENT & PLAN NOTE
Patient presented with left shoulder and arm pain with associated nausea and diaphoresis 45 minutes prior to presentation  The ED, EKG ST elevation in the inferior leads  STEMI alert-loaded with aspirin and Brilinta/heparin drip started on taken to the Cath Lab.  Patient underwent cardiac cath which showed 100% mid RCA stenosis status post stent x1  Currently in the ICU for hemodynamic monitoring    Plan   Continue dual antiplatelets  Follow-up lipid panel  Hold beta-blocker for now given bradycardia  Follow-up formal echo read

## 2024-02-19 NOTE — PROGRESS NOTES
Pastoral Care Progress Note    2024  Patient: Jerome Coffey : 1955  Admission Date & Time: 2024 0820  MRN: [id number] [facility]: [id number]                     Chaplaincy Interventions Utilized:   Empowerment: Clarified, confirmed, or reviewed information from treatment team     Exploration: Explored relational needs & resources and Explored spiritual needs & resources    Collaboration: Facilitated respect for spiritual/cultural practice during hospitalization    Relationship Building: Cultivated a relationship of care and support and Listened empathically    Chaplaincy Outcomes Achieved:  Expressed gratitude    Spiritual Coping Strategies Utilized:   Spiritual gratitude

## 2024-02-19 NOTE — CONSULTS
ENCOUNTER DATE: 02/19/24 8:56 AM  PATIENT NAME: Jerome Coffey   1955    [id number]  Age: 68 y.o.      Sex: male  ENCOUNTER PROVIDER & AUTHOR: Reji Baum MD  INPATIENT ATTENDING PHYSICIAN: ; PRIMARYCARE PHYSICIAN: No primary care provider on file.  DATE OF ADMISSION: 2/19/2024  8:20 AM; LENGTH OF STAY: 0 days  *-*-*-*-*-*-*-*-*-*-*-*-*-*-*-*-*-*-*-*-*-*-*-*-*-*-*-*-*-*-*-*-*-*-*-*-*-*-*-*-*-*-*-*-*-*-*-*-*-*-*-*-*-*-   REASON FOR CONSULTATION:   Chest pain, ECG abnormalities concerning for ST elevation MI.    *-*-*-*-*-*-*-*-*-*-*-*-*-*-*-*-*-*-*-*-*-*-*-*-*-*-*-*-*-*-*-*-*-*-*-*-*-*-*-*-*-*-*-*-*-*-*-*-*-*-*-*-*-*-  CARDIAC ASSESSMENT:     Suspected acute coronary syndrome, type I MI, Inferior ST elevation type  Bradycardia  Dyslipidemia  Degenerative joint disease,  Multilevel lumbar spondylosis with spondylolisthesis and associated spondylolysis at L5-S1.   History of gout       There is no problem list on file for this patient.           Patient's symptoms are concerning for acute coronary syndrome.  ECG is suggestive of acute ST elevation MI.  His risk factors include dyslipidemia and age. Case was discussed by ER physician with interventional cardiologist.  Patient will be treated on the lines of ACS with early invasive strategy.  His first high-sensitivity troponin is borderline at 18 ng/L.  His renal function and electrolytes are normal.    CARDIAC PLAN:     -- Patient being loaded with aspirin 325 mg and Brilinta 180 mg.  -- Is being started on heparin drip as per ACS protocol with the initiation bolus dose.  -- Morphine to be given x 1 to relieve pain and assess response.  -- Initiate high intensity statin therapy with atorvastatin 80 mg daily along with ezetimibe 10 mg daily.  -- Holding all beta-blocker and other AV mary blocking agents due to bradycardia.  -- Added BNP to the pending labs.  Chest x-ray performed and needs review.  -- Will follow-up on her central troponins and other lab  work.  -- Patient to be taken to cardiac Cath Lab shortly for left heart catheterization.  Procedure briefly discussed with patient.  -- Depending on the findings of the cardiac catheterization will make decision regarding postprocedure admission to critical care service and monitored in the  ICU for 24 hours or telemetry floor.  -- Echocardiogram to follow.    *-*-*-*-*-*-*-*-*-*-*-*-*-*-*-*-*-*-*-*-*-*-*-*-*-*-*-*-*-*-*-*-*-*-*-*-*-*-*-*-*-*-*-*-*-*-*-*-*-*-*-*-*-*-  HISTORY OF PRESENT ILLNESS     Patient is a 68-year-old gentleman with no major past medical history except history of degenerative joint disease with radiculopathy, history of osteoarthritis with chronic knee pain and history of renal calculi and history of gout.  He presented to emergency room from home after experiencing left shoulder and arm pain accompanied with profound diaphoresis.  Symptom started while he was on couch sitting and watching television.  Prior to that he had an uneventful early morning.  Symptom was sudden in onset and was accompanied with diaphoresis nausea and retching.  He initially attributed symptoms to shoulder  Pain related to history of labral tear in the past.  He got alarmed when symptoms persisted and decided to come to the emergency room with his wife.  He walked to the car and according to his wife he appeared very pale and was extremely diaphoretic.  Symptom lasted until he came to the emergency room.  He currently has some discomfort across the chest which is about 5.  0-10.  ECG in the ER showed sinus bradycardia.  There is to be ST elevation in lead III and aVF concerning for ST elevation MI.    Patient has no previous history of coronary artery disease or hypertension or congestive heart failure or arrhythmia or TIA/CVA or diabetes mellitus.    He denies any family history of premature coronary artery disease or sudden cardiac death.    He has never been a smoker and drinks rarely.  His last alcohol drink was  on Saturday.    He is a retired .    Blood work from today significant for sodium 141 potassium 4.1 chloride 104 bicarb 31 BUN 17 creatinine 1.16 GFR 64  Normal liver function test  High sensitive troponin 18  Previous blood work from 12/20/2023 indicated potassium of 4.2 GFR of 74.        His home medications included as needed colchicine, omega-3 fatty acids vitamin D2 sildenafil as needed.    *-*-*-*-*-*-*-*-*-*-*-*-*-*-*-*-*-*-*-*-*-*-*-*-*-*-*-*-*-*-*-*-*-*-*-*-*-*-*-*-*-*-*-*-*-*-*-*-*-*-*-*-*-*  PAST MEDICAL HISTORY:     Past Medical History:   Diagnosis Date    Erectile dysfunction     Knee pain, right     Renal calculi     PAST SURGICAL HISTORY     History reviewed. No pertinent surgical history.       FAMILY HISTORY     History reviewed. No pertinent family history.  SOCIAL HISTORY     Social History     Tobacco Use   Smoking Status Never   Smokeless Tobacco Never      Social History     Substance and Sexual Activity   Alcohol Use Yes    Comment: social drinker     Social History     Substance and Sexual Activity   Drug Use Never    [unfilled]     *-*-*-*-*-*-*-*-*-*-*-*-*-*-*-*-*-*-*-*-*-*-*-*-*-*-*-*-*-*-*-*-*-*-*-*-*-*-*-*-*-*-*-*-*-*-*-*-*-*-*-*-*-*  ALLERGIES     Allergies   Allergen Reactions    Clindamycin GI Intolerance     CURRENT SCHEDULED MEDICATIONS       Current Facility-Administered Medications:     heparin (porcine) 25,000 units in 0.45% NaCl 250 mL infusion (premix), 3-20 Units/kg/hr (Order-Specific), Intravenous, Titrated, Carlyle Roland MD    heparin (porcine) injection 2,000 Units, 2,000 Units, Intravenous, Q6H PRN, Carlyle Roland MD    heparin (porcine) injection 4,000 Units, 4,000 Units, Intravenous, Q6H PRN, Carlyle Roland MD    Insert peripheral IV, , , Once **AND** sodium chloride (PF) 0.9 % injection 3 mL, 3 mL, Intravenous, Q1H PRN, Carlyle Roland MD    sodium chloride 0.9 % infusion, 75 mL/hr, Intravenous, Continuous, Carlyle Roland MD, Last Rate:  75 mL/hr at 02/19/24 0854, 75 mL/hr at 02/19/24 0854    ticagrelor (BRILINTA) tablet 180 mg, 180 mg, Oral, Once **AND** ticagrelor (BRILINTA) tablet 90 mg, 90 mg, Oral, Q12H [facility], Carlyle Roland MD    Current Outpatient Medications:     Cholecalciferol 50 MCG (2000 UT) CAPS, Take 1 capsule by mouth, Disp: , Rfl:     Clenpiq 10-3.5-12 MG-GM -GM/160ML SOLN, , Disp: , Rfl:     colchicine (COLCRYS) 0.6 mg tablet, TAKE 1 TABLET BY MOUTH 3 TIMES A DAY FOR 7 DAYS AS NEEDED FOR GOUT FLARE (Patient not taking: Reported on 11/9/2022), Disp: , Rfl:     colchicine (COLCRYS) 0.6 mg tablet, One tid prn gout, Disp: 21 tablet, Rfl: 0    Ergocalciferol (VITAMIN D2 PO), Take by mouth, Disp: , Rfl:     Glucosamine 750 MG TABS, Take 2 tablets by mouth daily, Disp: , Rfl:     meloxicam (MOBIC) 7.5 mg tablet, Take 7.5 mg by mouth daily, Disp: , Rfl:     metaxalone (SKELAXIN) 800 mg tablet, Take 800 mg by mouth Three times a day (Patient not taking: Reported on 11/9/2022), Disp: , Rfl:     Multiple Vitamins-Minerals (MENS MULTIVITAMIN PO), , Disp: , Rfl:     Omega-3 Fatty Acids (Fish Oil Omega-3) 1000 MG CAPS, Take 2 g by mouth daily, Disp: , Rfl:     sildenafil (VIAGRA) 100 mg tablet, TAKE ONE-HALF TO ONE TABLET BY MOUTH 30 TO 60 MINUTES PRIOR TO SEX AS NEEDED, Disp: , Rfl:      *-*-*-*-*-*-*-*-*-*-*-*-*-*-*-*-*-*-*-*-*-*-*-*-*-*-*-*-*-*-*-*-*-*-*-*-*-*-*-*-*-*-*-*-*-*-*-*-*-*-*-*-*-*   REVIEW OF SYSTEMS     Positive for: As noted above in HPI  Negative for: All remaining as reviewed below and in HPI. SYSTEM SYMPTOMS REVIEWED:  General--weight change, fever, night sweats  Respiratoryl-- Wheezing, shortness of breath, cough, URI symptoms, sputum, blood  Cardiovascular--chest pain, syncope, dyspnea on exertion, edema, decline in exercise tolerance, claudication   Gastrointestinal--persistent vomiting, diarrhea, abdominal distention, blood in stool   Muscular or skeletal--joint pain or swelling   Neurologic--headaches, syncope, abnormal  "movement  Hematologic--history of easy bruising and bleeding   Endocrine--thyroid enlargement, heat or cold intolerance, polyuria   Psychiatric--anxiety, depression      *-*-*-*-*-*-*-*-*-*-*-*-*-*-*-*-*-*-*-*-*-*-*-*-*-*-*-*-*-*-*-*-*-*-*-*-*-*-*-*-*-*-*-*-*-*-*-*-*-*-*-*-*-*-   VITAL SIGNS       Vitals:    24 0828 24 0834 24 0835   BP: 140/75     BP Location: Left arm     Pulse: (!) 54     Resp: 20     Temp:  97.7 °F (36.5 °C)    TempSrc:  Oral    SpO2: 97%     Weight:   116 kg (256 lb 9.9 oz)   Height: 6' 1\" (1.854 m)       TEMPERATURE HISTORY 24H: Temp (24hrs), Av.7 °F (36.5 °C), Min:97.7 °F (36.5 °C), Max:97.7 °F (36.5 °C)   . BLOOD PRESSURE HISTORY: Systolic (36hrs), Av , Min:140 , Max:140    Diastolic (36hrs), Av, Min:75, Max:75      Weight    24 0835   Weight: 116 kg (256 lb 9.9 oz)      Body mass index is 33.86 kg/m². Wt Readings from Last 10 Encounters:   24 116 kg (256 lb 9.9 oz)    No intake or output data in the 24 hours ending 24 0856     *-*-*-*-*-*-*-*-*-*-*-*-*-*-*-*-*-*-*-*-*-*-*-*-*-*-*-*-*-*-*-*-*-*-*-*-*-*-*-*-*-*-*-*-*-*-*-*-*-*-*-*-*-*-   PHYSICAL EXAMINATION:     General Appearance:    Alert, cooperative, no distress, appears stated age, tall, slightly large build   Head, Eyes, ENT:  Appears diaphoretic, moist mucous mebranes.   Neck:   Supple, no carotid bruit or JVD   Back:     Symmetric, no curvature.   Lungs:     Respirations unlabored. Clear to auscultation bilaterally,    Chest wall:    No tenderness or deformity   Heart:    Regular rate and rhythm, bradycardic, regular rate and rhythm, normal intensity heart sounds   Abdomen:     Soft, non-tender, No obvious masses, or organomegaly   Extremities:   Extremities warm, no cyanosis or edema    Skin: Diaphoretic skin+     *-*-*-*-*-*-*-*-*-*-*-*-*-*-*-*-*-*-*-*-*-*-*-*-*-*-*-*-*-*-*-*-*-*-*-*-*-*-*-*-*-*-*-*-*-*-*-*-*-*-*-*-*-*-   LABORATORY DATA:   I have personally reviewed pertinent " "labs.    CMP:      Invalid input(s): \"LABALBU\"            Cardiac Profile:       Invalid input(s): \"CK\", \"CKMBP\"                 Blood Gas Analysis:             CBC:     PT/INR: No results found for: \"PT\", \"INR\", Microbiology:            *-*-*-*-*-*-*-*-*-*-*-*-*-*-*-*-*-*-*-*-*-*-*-*-*-*-*-*-*-*-*-*-*-*-*-*-*-*-*-*-*-*-*-*-*-*-*-*-*-*-*-*-*-*-  TELEMETRY, LAST ECG:  Telemetry reviewed. ... Sinus bradycardia     No results found for this visit on 02/19/24.     *-*-*-*-*-*-*-*-*-*-*-*-*-*-*-*-*-*-*-*-*-*-*-*-*-*-*-*-*-*-*-*-*-*-*-*-*-*-*-*-*-*-*-*-*-*-*-*-*-*-*-*-*-*-  IMAGING STUDIES REPORTS: Imaging studies results reviewed....  No valid procedures specified.  No Chest XR results available for this patient.    *-*-*-*-*-*-*-*-*-*-*-*-*-*-*-*-*-*-*-*-*-*-*-*-*-*-*-*-*-*-*-*-*-*-*-*-*-*-*-*-*-*-*-*-*-*-*-*-*-*-*-*-*-*-  AVAILABLE OLD CARDIAC TESTS REPORTS:   No results found for this or any previous visit.    No results found for this or any previous visit.    No results found for this or any previous visit.    No results found for this or any previous visit.         *-*-*-*-*-*-*-*-*-*-*-*-*-*-*-*-*-*-*-*-*-*-*-*-*-*-*-*-*-*-*-*-*-*-*-*-*-*-*-*-*-*-*-*-*-*-*-*-*-*-*-*-*-*-  SIGNATURES:   @SIG@   Reji Baum MD     CC:   No primary care provider on file.   No ref. provider found     "

## 2024-02-19 NOTE — H&P
[facility]  H&P  Name: Jerome Coffey 68 y.o. male I MRN: [id number]  Unit/Bed#: ICU 15 I Date of Admission: 2/19/2024   Date of Service: 2/19/2024 I Hospital Day: 0      Assessment/Plan   Coronary artery disease involving native coronary artery  Assessment & Plan  As above STEMI  Continue dual antiplatelets and follow-up labs  Discussed lifestyle changes    * STEMI (ST elevation myocardial infarction) S/P PCI  Assessment & Plan  Patient presented with left shoulder and arm pain with associated nausea and diaphoresis 45 minutes prior to presentation  The ED, EKG ST elevation in the inferior leads  STEMI alert-loaded with aspirin and Brilinta/heparin drip started on taken to the Cath Lab.  Patient underwent cardiac cath which showed 100% mid RCA stenosis status post stent x1  Currently in the ICU for hemodynamic monitoring    Plan   Continue dual antiplatelets  Follow-up lipid panel  Hold beta-blocker for now given bradycardia  Follow-up formal echo read

## 2024-02-19 NOTE — ED PROVIDER NOTES
History  Chief Complaint   Patient presents with    Chest Pain     Pt reports left sided chest pain that radiates into left arm starting around 45 minutes ago. Pt also reports increase diaphoresis. Pt denies cardiac hx.     69 YO male presents with Left shoulder and arm pain. Patient states this has been an aching in the shoulder radiating down into the arm. It began suddenly ~45 minutes PTA and has been constant. He has had associated nausea, lightheadedness and diaphoresis. Patient has never had similar in the past, he has no known cardiac issues. He has not taken anything for this. Pt denies SOB/F/C/N/V/D/C, no dysuria, burning on urination or blood in urine.       History provided by:  Patient and spouse   used: No        Prior to Admission Medications   Prescriptions Last Dose Informant Patient Reported? Taking?   Cholecalciferol 50 MCG (2000 UT) CAPS Not Taking  Yes No   Sig: Take 1 capsule by mouth   Patient not taking: Reported on 2/19/2024   Clenpiq 10-3.5-12 MG-GM -GM/160ML SOLN Not Taking  Yes No   Patient not taking: Reported on 11/9/2022   Ergocalciferol (VITAMIN D2 PO) 2/18/2024  Yes Yes   Sig: Take by mouth   Glucosamine 750 MG TABS 2/18/2024  Yes Yes   Sig: Take 2 tablets by mouth daily   Multiple Vitamins-Minerals (MENS MULTIVITAMIN PO) 2/18/2024  Yes Yes   Omega-3 Fatty Acids (Fish Oil Omega-3) 1000 MG CAPS 2/18/2024  Yes Yes   Sig: Take 2 g by mouth daily   colchicine (COLCRYS) 0.6 mg tablet 2/18/2024  Yes Yes   colchicine (COLCRYS) 0.6 mg tablet   No No   Sig: One tid prn gout   meloxicam (MOBIC) 7.5 mg tablet Not Taking  Yes No   Sig: Take 7.5 mg by mouth daily   Patient not taking: Reported on 2/19/2024   metaxalone (SKELAXIN) 800 mg tablet Not Taking  Yes No   Sig: Take 800 mg by mouth Three times a day   Patient not taking: Reported on 11/9/2022   sildenafil (VIAGRA) 100 mg tablet Past Month  Yes Yes   Sig: TAKE ONE-HALF TO ONE TABLET BY MOUTH 30 TO 60 MINUTES PRIOR TO SEX  AS NEEDED      Facility-Administered Medications: None       Past Medical History:   Diagnosis Date    Erectile dysfunction     Knee pain, right     Renal calculi        History reviewed. No pertinent surgical history.    History reviewed. No pertinent family history.  I have reviewed and agree with the history as documented.    E-Cigarette/Vaping    E-Cigarette Use Never User      E-Cigarette/Vaping Substances     Social History     Tobacco Use    Smoking status: Never    Smokeless tobacco: Never   Vaping Use    Vaping status: Never Used   Substance Use Topics    Alcohol use: Yes     Comment: social drinker    Drug use: Never       Review of Systems   Constitutional:  Positive for diaphoresis. Negative for fever.   HENT:  Negative for dental problem.    Eyes:  Negative for visual disturbance.   Respiratory:  Negative for shortness of breath.    Cardiovascular:  Positive for chest pain.   Gastrointestinal:  Positive for nausea. Negative for abdominal pain and vomiting.   Genitourinary:  Negative for dysuria and frequency.   Musculoskeletal:  Negative for neck pain and neck stiffness.   Skin:  Negative for rash.   Neurological:  Positive for light-headedness. Negative for dizziness and weakness.   Psychiatric/Behavioral:  Negative for agitation, behavioral problems and confusion.    All other systems reviewed and are negative.      Physical Exam  Physical Exam  Vitals and nursing note reviewed.   Constitutional:       Appearance: He is well-developed.   HENT:      Head: Normocephalic and atraumatic.   Eyes:      Extraocular Movements: Extraocular movements intact.   Cardiovascular:      Rate and Rhythm: Normal rate and regular rhythm.      Pulses: Normal pulses.      Heart sounds: Normal heart sounds.   Pulmonary:      Effort: Pulmonary effort is normal.      Breath sounds: Normal breath sounds.   Abdominal:      General: There is no distension.   Musculoskeletal:         General: Normal range of motion.       Cervical back: Normal range of motion.   Skin:     Findings: No rash.   Neurological:      Mental Status: He is alert and oriented to person, place, and time.   Psychiatric:         Behavior: Behavior normal.         Vital Signs  ED Triage Vitals   Temperature Pulse Respirations Blood Pressure SpO2   02/19/24 0834 02/19/24 0828 02/19/24 0828 02/19/24 0828 02/19/24 0828   97.7 °F (36.5 °C) (!) 54 20 140/75 97 %      Temp Source Heart Rate Source Patient Position - Orthostatic VS BP Location FiO2 (%)   02/19/24 0834 02/19/24 0828 02/19/24 0828 02/19/24 0828 --   Oral Monitor Lying Left arm       Pain Score       02/19/24 0828       6           Vitals:    02/19/24 0857 02/19/24 0900 02/19/24 0906 02/19/24 0913   BP: 140/75 148/81 129/73 130/83   Pulse: 60 89 58 56   Patient Position - Orthostatic VS:   Lying          Visual Acuity      ED Medications  Medications   sodium chloride (PF) 0.9 % injection 3 mL ( Intravenous MAR Unhold 2/19/24 0941)   ticagrelor (BRILINTA) tablet 180 mg (180 mg Oral Given 2/19/24 0856)     And   ticagrelor (BRILINTA) tablet 90 mg ( Oral MAR Unhold 2/19/24 0941)   aspirin chewable tablet 81 mg (has no administration in time range)   acetaminophen (TYLENOL) tablet 650 mg (has no administration in time range)   heparin (porcine) subcutaneous injection 5,000 Units (has no administration in time range)   atorvastatin (LIPITOR) tablet 80 mg (has no administration in time range)   sodium chloride 0.9 % infusion (has no administration in time range)   nitroglycerin (NITROSTAT) SL tablet 0.4 mg (has no administration in time range)   aspirin tablet 325 mg (325 mg Oral Given 2/19/24 0838)   heparin (porcine) injection 4,000 Units (4,000 Units Intravenous Given 2/19/24 0854)   morphine injection 4 mg (4 mg Intravenous Given 2/19/24 0855)       Diagnostic Studies  Results Reviewed       Procedure Component Value Units Date/Time    Platelet count [[id number]]     Lab Status: No result Specimen: Blood      CBC and differential [[id number]] Collected: 02/19/24 0909    Lab Status: Final result Specimen: Blood from Arm, Right Updated: 02/19/24 0920     WBC 9.62 Thousand/uL      RBC 5.07 Million/uL      Hemoglobin 16.0 g/dL      Hematocrit 47.0 %      MCV 93 fL      MCH 31.6 pg      MCHC 34.0 g/dL      RDW 13.7 %      MPV 10.5 fL      Platelets 211 Thousands/uL      nRBC 0 /100 WBCs      Neutrophils Relative 54 %      Immat GRANS % 0 %      Lymphocytes Relative 36 %      Monocytes Relative 7 %      Eosinophils Relative 2 %      Basophils Relative 1 %      Neutrophils Absolute 5.28 Thousands/µL      Immature Grans Absolute 0.03 Thousand/uL      Lymphocytes Absolute 3.42 Thousands/µL      Monocytes Absolute 0.69 Thousand/µL      Eosinophils Absolute 0.15 Thousand/µL      Basophils Absolute 0.05 Thousands/µL     Basic metabolic panel [[id number]]  (Abnormal) Collected: 02/19/24 0837    Lab Status: Final result Specimen: Blood from Arm, Right Updated: 02/19/24 0910     Sodium 141 mmol/L      Potassium 4.1 mmol/L      Chloride 104 mmol/L      CO2 31 mmol/L      ANION GAP 6 mmol/L      BUN 17 mg/dL      Creatinine 1.16 mg/dL      Glucose 153 mg/dL      Calcium 8.9 mg/dL      eGFR 64 ml/min/1.73sq m     Narrative:      [employer] guidelines for Chronic Kidney Disease (CKD):     Stage 1 with normal or high GFR (GFR > 90 mL/min/1.73 square meters)    Stage 2 Mild CKD (GFR = 60-89 mL/min/1.73 square meters)    Stage 3A Moderate CKD (GFR = 45-59 mL/min/1.73 square meters)    Stage 3B Moderate CKD (GFR = 30-44 mL/min/1.73 square meters)    Stage 4 Severe CKD (GFR = 15-29 mL/min/1.73 square meters)    Stage 5 End Stage CKD (GFR <15 mL/min/1.73 square meters)  Note: GFR calculation is accurate only with a steady state creatinine    Hepatic function panel [[id number]]  (Normal) Collected: 02/19/24 0837    Lab Status: Final result Specimen: Blood from Arm, Right Updated: 02/19/24 0910     Total Bilirubin 0.52 mg/dL       Bilirubin, Direct 0.08 mg/dL      Alkaline Phosphatase 55 U/L      AST 27 U/L      ALT 24 U/L      Total Protein 6.9 g/dL      Albumin 4.2 g/dL     HS Troponin 0hr (reflex protocol) [[id number]]  (Normal) Collected: 02/19/24 0837    Lab Status: Final result Specimen: Blood from Arm, Right Updated: 02/19/24 0909     hs TnI 0hr 18 ng/L     HS Troponin I 2hr [[id number]]     Lab Status: No result Specimen: Blood     Protime-INR [[id number]]  (Normal) Collected: 02/19/24 0837    Lab Status: Final result Specimen: Blood from Arm, Right Updated: 02/19/24 0903     Protime 12.6 seconds      INR 0.93    APTT [[id number]]  (Normal) Collected: 02/19/24 0837    Lab Status: Final result Specimen: Blood from Arm, Right Updated: 02/19/24 0903     PTT 26 seconds                    XR chest 1 view portable    (Results Pending)              Procedures  ECG 12 Lead Documentation Only    Date/Time: 2/19/2024 9:58 AM    Performed by: Carlyle Roland MD  Authorized by: Carllye Roland MD    ECG reviewed by me, the ED Provider: yes    Patient location:  ED  Interpretation:     Interpretation: normal    Rate:     ECG rate:  52    ECG rate assessment: bradycardic    Rhythm:     Rhythm: sinus rhythm and sinus bradycardia    QRS:     QRS axis:  Normal    QRS intervals:  Normal  Conduction:     Conduction: normal    ST segments:     ST segments:  Abnormal    Elevation:  II, III and aVF  T waves:     T waves: normal      Conscious Sedation Assessment      Flowsheet Row Classification Score   ASA Scale Assessment 3-Severe systemic disease that results in functional limitation filed at 02/19/2024 0920   Mallampati Classification Class II: soft palate, uvula, fauces visible - No Difficulty filed at 02/19/2024 0920               ED Course  ED Course as of 02/19/24 1000   Mon Feb 19, 2024   0840 Discussed with interventional cardiologist who feels patient is appropriate for catheterization. Will order Brilinta, heparin.                                              Medical Decision Making  1. Shoulder pain - Patient with constant shoulder pain, onset ~45 minutes PTA. Patient has a concerning ECG with elevations in inferior leads, no reciprocal changes. Will order troponin, CBC, metabolic panel for electrolyte abnormalities and dehydration, will speak with cardiology regarding concern for STEMI.    Problems Addressed:  Chest pain: acute illness or injury    Amount and/or Complexity of Data Reviewed  Independent Historian: spouse  Labs: ordered.  ECG/medicine tests: ordered and independent interpretation performed.    Risk  OTC drugs.  Prescription drug management.  Decision regarding hospitalization.             Disposition  Final diagnoses:   Chest pain     Time reflects when diagnosis was documented in both MDM as applicable and the Disposition within this note       Time User Action Codes Description Comment    2/19/2024  8:45 AM Carlyle Roland Add [R07.9] Chest pain     2/19/2024  8:46 AM Vale Herron Add [I21.3] STEMI (ST elevation myocardial infarction) (HCC)     2/19/2024  9:46 AM Brandi Fernandez Modify [R07.9] Chest pain     2/19/2024  9:46 AM Brandi Fernandez Modify [I21.3] STEMI (ST elevation myocardial infarction) (HCC)           ED Disposition       ED Disposition   Send to Cath Lab    Condition   --    Date/Time   Mon Feb 19, 2024 0005    Comment   --             Follow-up Information    None         Current Discharge Medication List        CONTINUE these medications which have NOT CHANGED    Details   !! colchicine (COLCRYS) 0.6 mg tablet       Ergocalciferol (VITAMIN D2 PO) Take by mouth      Glucosamine 750 MG TABS Take 2 tablets by mouth daily      Multiple Vitamins-Minerals (MENS MULTIVITAMIN PO)       Omega-3 Fatty Acids (Fish Oil Omega-3) 1000 MG CAPS Take 2 g by mouth daily      sildenafil (VIAGRA) 100 mg tablet TAKE ONE-HALF TO ONE TABLET BY MOUTH 30 TO 60 MINUTES PRIOR TO SEX AS NEEDED       Cholecalciferol 50 MCG (2000 UT) CAPS Take 1 capsule by mouth      Clenpiq 10-3.5-12 MG-GM -GM/160ML SOLN       !! colchicine (COLCRYS) 0.6 mg tablet One tid prn gout  Qty: 21 tablet, Refills: 0    Associated Diagnoses: Acute gout of right foot, unspecified cause      meloxicam (MOBIC) 7.5 mg tablet Take 7.5 mg by mouth daily      metaxalone (SKELAXIN) 800 mg tablet Take 800 mg by mouth Three times a day       !! - Potential duplicate medications found. Please discuss with provider.              PDMP Review       None            ED Provider  Electronically Signed by             Carlyle Roland MD  02/19/24 7298

## 2024-02-19 NOTE — ASSESSMENT & PLAN NOTE
Patient presented with left shoulder and arm pain with associated nausea and diaphoresis 45 minutes prior to presentation  The ED, EKG ST elevation in the inferior leads  STEMI alert-loaded with aspirin and Brilinta/heparin drip started on taken to the Cath Lab.  Patient underwent cardiac cath which showed 100% mid RCA stenosis status post stent x1  Currently in the ICU for hemodynamic monitoring  ECHO 2/19-   Left ventricular cavity size is normal. Wall thickness is moderately increased. There is moderate concentric hypertrophy. There is concentric remodeling. The left ventricular ejection fraction is 65% by single dimension measurement. Diastolic function is normal.    Right Ventricle: Right ventricular cavity size is mildly dilated.    Right Atrium: The atrium is mildly dilated.    Mitral Valve: There is mild regurgitation.  RVSP 54, IVC dilated  Plan   Appreciate cardiology  Continue dual antiplatelets  Atorvastatin 80 mg  Follow-up lipid panel  Hold beta-blocker for now given bradycardia-consider restarting

## 2024-02-19 NOTE — DISCHARGE INSTR - AVS FIRST PAGE
1. Please see the post angioplasty discharge instructions.   No heavy lifting, greater than 10 lbs. or strenuous activity for 5 days.  Follow angioplasty discharge instructions.    2.Remove band aid tomorrow.  Shower and wash area- wrist gently with soap and water- beginning tomorrow. Rinse and pat dry.  Apply new water seal band aid.  Repeat this process for 5 days. No powders, creams lotions or antibiotic ointments  for 5 days.  No tub baths, hot tubs or swimming for 5 days.     3. Call [facility]'s Cardiology Office ([phone]) if you develop a fever, redness or drainage at your wrist access site.      4. No driving for 2 days    5. Do not stop aspirin or Brilinta (Ticagrelor) any reason without a cardiologist’s consent, or the stent could block up and cause a heart attack.    6. Stent card and book.

## 2024-02-20 VITALS
OXYGEN SATURATION: 94 % | DIASTOLIC BLOOD PRESSURE: 77 MMHG | TEMPERATURE: 98.2 F | WEIGHT: 258.6 LBS | SYSTOLIC BLOOD PRESSURE: 116 MMHG | HEIGHT: 73 IN | HEART RATE: 56 BPM | RESPIRATION RATE: 23 BRPM | BODY MASS INDEX: 34.27 KG/M2

## 2024-02-20 PROBLEM — I27.21 PAH (PULMONARY ARTERY HYPERTENSION) (HCC): Status: ACTIVE | Noted: 2024-02-20

## 2024-02-20 PROBLEM — M10.9 GOUT: Status: ACTIVE | Noted: 2024-02-20

## 2024-02-20 LAB
ANION GAP SERPL CALCULATED.3IONS-SCNC: 5 MMOL/L
ATRIAL RATE: 63 BPM
BUN SERPL-MCNC: 14 MG/DL (ref 5–25)
CALCIUM SERPL-MCNC: 8 MG/DL (ref 8.4–10.2)
CHLORIDE SERPL-SCNC: 108 MMOL/L (ref 96–108)
CHOLEST SERPL-MCNC: 167 MG/DL
CO2 SERPL-SCNC: 24 MMOL/L (ref 21–32)
CREAT SERPL-MCNC: 0.89 MG/DL (ref 0.6–1.3)
ERYTHROCYTE [DISTWIDTH] IN BLOOD BY AUTOMATED COUNT: 13.6 % (ref 11.6–15.1)
EST. AVERAGE GLUCOSE BLD GHB EST-MCNC: 120 MG/DL
EST. AVERAGE GLUCOSE BLD GHB EST-MCNC: 120 MG/DL
GFR SERPL CREATININE-BSD FRML MDRD: 88 ML/MIN/1.73SQ M
GLUCOSE SERPL-MCNC: 100 MG/DL (ref 65–140)
HBA1C MFR BLD: 5.8 %
HBA1C MFR BLD: 5.8 %
HCT VFR BLD AUTO: 40.6 % (ref 36.5–49.3)
HDLC SERPL-MCNC: 35 MG/DL
HGB BLD-MCNC: 13.9 G/DL (ref 12–17)
LDLC SERPL CALC-MCNC: 59 MG/DL (ref 0–100)
MAGNESIUM SERPL-MCNC: 2.1 MG/DL (ref 1.9–2.7)
MCH RBC QN AUTO: 32 PG (ref 26.8–34.3)
MCHC RBC AUTO-ENTMCNC: 34.2 G/DL (ref 31.4–37.4)
MCV RBC AUTO: 94 FL (ref 82–98)
P AXIS: 44 DEGREES
PLATELET # BLD AUTO: 180 THOUSANDS/UL (ref 149–390)
PMV BLD AUTO: 10.9 FL (ref 8.9–12.7)
POTASSIUM SERPL-SCNC: 4.2 MMOL/L (ref 3.5–5.3)
PR INTERVAL: 179 MS
QRS AXIS: -48 DEGREES
QRSD INTERVAL: 92 MS
QT INTERVAL: 400 MS
QTC INTERVAL: 410 MS
RBC # BLD AUTO: 4.34 MILLION/UL (ref 3.88–5.62)
SODIUM SERPL-SCNC: 137 MMOL/L (ref 135–147)
T WAVE AXIS: -12 DEGREES
TRIGL SERPL-MCNC: 363 MG/DL
TSH SERPL DL<=0.05 MIU/L-ACNC: 1.59 UIU/ML (ref 0.45–4.5)
VENTRICULAR RATE: 63 BPM
WBC # BLD AUTO: 9.45 THOUSAND/UL (ref 4.31–10.16)

## 2024-02-20 PROCEDURE — 93005 ELECTROCARDIOGRAM TRACING: CPT

## 2024-02-20 PROCEDURE — 84443 ASSAY THYROID STIM HORMONE: CPT | Performed by: INTERNAL MEDICINE

## 2024-02-20 PROCEDURE — 80048 BASIC METABOLIC PNL TOTAL CA: CPT

## 2024-02-20 PROCEDURE — NC001 PR NO CHARGE: Performed by: INTERNAL MEDICINE

## 2024-02-20 PROCEDURE — 99232 SBSQ HOSP IP/OBS MODERATE 35: CPT | Performed by: INTERNAL MEDICINE

## 2024-02-20 PROCEDURE — 93010 ELECTROCARDIOGRAM REPORT: CPT | Performed by: INTERNAL MEDICINE

## 2024-02-20 PROCEDURE — 80061 LIPID PANEL: CPT

## 2024-02-20 PROCEDURE — 83036 HEMOGLOBIN GLYCOSYLATED A1C: CPT

## 2024-02-20 PROCEDURE — 83036 HEMOGLOBIN GLYCOSYLATED A1C: CPT | Performed by: INTERNAL MEDICINE

## 2024-02-20 PROCEDURE — 83735 ASSAY OF MAGNESIUM: CPT

## 2024-02-20 PROCEDURE — 85027 COMPLETE CBC AUTOMATED: CPT

## 2024-02-20 RX ORDER — ASPIRIN 81 MG/1
81 TABLET, CHEWABLE ORAL DAILY
Qty: 60 TABLET | Refills: 0 | Status: SHIPPED | OUTPATIENT
Start: 2024-02-21 | End: 2024-04-21

## 2024-02-20 RX ORDER — LOSARTAN POTASSIUM 25 MG/1
25 TABLET ORAL DAILY
Status: DISCONTINUED | OUTPATIENT
Start: 2024-02-20 | End: 2024-02-20 | Stop reason: HOSPADM

## 2024-02-20 RX ORDER — ATORVASTATIN CALCIUM 80 MG/1
80 TABLET, FILM COATED ORAL
Qty: 60 TABLET | Refills: 0 | Status: SHIPPED | OUTPATIENT
Start: 2024-02-20 | End: 2024-02-26 | Stop reason: SDUPTHER

## 2024-02-20 RX ORDER — LOSARTAN POTASSIUM 25 MG/1
25 TABLET ORAL DAILY
Qty: 30 TABLET | Refills: 0 | Status: SHIPPED | OUTPATIENT
Start: 2024-02-21 | End: 2024-02-26 | Stop reason: SDUPTHER

## 2024-02-20 RX ORDER — NITROGLYCERIN 0.4 MG/1
0.4 TABLET SUBLINGUAL
Qty: 100 TABLET | Refills: 0 | Status: SHIPPED | OUTPATIENT
Start: 2024-02-20 | End: 2024-03-21

## 2024-02-20 RX ORDER — CALCIUM GLUCONATE 20 MG/ML
1 INJECTION, SOLUTION INTRAVENOUS ONCE
Status: COMPLETED | OUTPATIENT
Start: 2024-02-20 | End: 2024-02-20

## 2024-02-20 RX ADMIN — CHLORHEXIDINE GLUCONATE 15 ML: 1.2 RINSE ORAL at 09:16

## 2024-02-20 RX ADMIN — CALCIUM GLUCONATE 1 G: 20 INJECTION, SOLUTION INTRAVENOUS at 09:16

## 2024-02-20 RX ADMIN — Medication 12.5 MG: at 13:30

## 2024-02-20 RX ADMIN — HEPARIN SODIUM 5000 UNITS: 5000 INJECTION INTRAVENOUS; SUBCUTANEOUS at 05:02

## 2024-02-20 RX ADMIN — ASPIRIN 81 MG CHEWABLE TABLET 81 MG: 81 TABLET CHEWABLE at 09:16

## 2024-02-20 RX ADMIN — LOSARTAN POTASSIUM 25 MG: 25 TABLET, FILM COATED ORAL at 12:00

## 2024-02-20 RX ADMIN — TICAGRELOR 90 MG: 90 TABLET ORAL at 09:16

## 2024-02-20 NOTE — PLAN OF CARE
Problem: PAIN - ADULT  Goal: Verbalizes/displays adequate comfort level or baseline comfort level  Description: Interventions:  - Encourage patient to monitor pain and request assistance  - Assess pain using appropriate pain scale  - Administer analgesics based on type and severity of pain and evaluate response  - Implement non-pharmacological measures as appropriate and evaluate response  - Consider cultural and social influences on pain and pain management  - Notify physician/advanced practitioner if interventions unsuccessful or patient reports new pain  Outcome: Progressing     Problem: INFECTION - ADULT  Goal: Absence or prevention of progression during hospitalization  Description: INTERVENTIONS:  - Assess and monitor for signs and symptoms of infection  - Monitor lab/diagnostic results  - Monitor all insertion sites, i.e. indwelling lines, tubes, and drains  - Monitor endotracheal if appropriate and nasal secretions for changes in amount and color  - [place] appropriate cooling/warming therapies per order  - Administer medications as ordered  - Instruct and encourage patient and family to use good hand hygiene technique  - Identify and instruct in appropriate isolation precautions for identified infection/condition  Outcome: Progressing  Goal: Absence of fever/infection during neutropenic period  Description: INTERVENTIONS:  - Monitor WBC    Outcome: Progressing     Problem: SAFETY ADULT  Goal: Patient will remain free of falls  Description: INTERVENTIONS:  - Educate patient/family on patient safety including physical limitations  - Instruct patient to call for assistance with activity   - Consult OT/PT to assist with strengthening/mobility   - Keep Call bell within reach  - Keep bed low and locked with side rails adjusted as appropriate  - Keep care items and personal belongings within reach  - Initiate and maintain comfort rounds  - Make Fall Risk Sign visible to staff  - Offer Toileting every 2 Hours,  in advance of need  - Initiate/Maintain bed alarm  - Apply yellow socks and bracelet for high fall risk patients  - Consider moving patient to room near nurses station  Outcome: Progressing  Goal: Maintain or return to baseline ADL function  Description: INTERVENTIONS:  -  Assess patient's ability to carry out ADLs; assess patient's baseline for ADL function and identify physical deficits which impact ability to perform ADLs (bathing, care of mouth/teeth, toileting, grooming, dressing, etc.)  - Assess/evaluate cause of self-care deficits   - Assess range of motion  - Assess patient's mobility; develop plan if impaired  - Assess patient's need for assistive devices and provide as appropriate  - Encourage maximum independence but intervene and supervise when necessary  - Involve family in performance of ADLs  - Assess for home care needs following discharge   - Consider OT consult to assist with ADL evaluation and planning for discharge  - Provide patient education as appropriate  Outcome: Progressing  Goal: Maintains/Returns to pre admission functional level  Description: INTERVENTIONS:  - Perform AM-PAC 6 Click Basic Mobility/ Daily Activity assessment daily.  - Set and communicate daily mobility goal to care team and patient/family/caregiver.   - Collaborate with rehabilitation services on mobility goals if consulted  - Out of bed for toileting  - Record patient progress and toleration of activity level   Outcome: Progressing     Problem: DISCHARGE PLANNING  Goal: Discharge to home or other facility with appropriate resources  Description: INTERVENTIONS:  - Identify barriers to discharge w/patient and caregiver  - Arrange for needed discharge resources and transportation as appropriate  - Identify discharge learning needs (meds, wound care, etc.)  - Arrange for interpretive services to assist at discharge as needed  - Refer to Case Management Department for coordinating discharge planning if the patient needs  post-hospital services based on physician/advanced practitioner order or complex needs related to functional status, cognitive ability, or social support system  Outcome: Progressing     Problem: Knowledge Deficit  Goal: Patient/family/caregiver demonstrates understanding of disease process, treatment plan, medications, and discharge instructions  Description: Complete learning assessment and assess knowledge base.  Interventions:  - Provide teaching at level of understanding  - Provide teaching via preferred learning methods  Outcome: Progressing

## 2024-02-20 NOTE — ASSESSMENT & PLAN NOTE
As above STEMI  DAPT, statin, losartan 25, metoprolol 12.5 twice daily  Discussed lifestyle changes

## 2024-02-20 NOTE — NURSING NOTE
AVS reviewed with patient & wife. All questions answered. Pt walked to car. All belongs sent with patient. PIVs removed.

## 2024-02-20 NOTE — DISCHARGE SUMMARY
Critical access hospital  Discharge- Jerome Coffey 1955, 68 y.o. male MRN: [id number]  Unit/Bed#: ICU 15 Encounter: [id number]  Primary Care Provider: No primary care provider on file.   Date and time admitted to hospital: 2/19/2024  8:20 AM    * STEMI (ST elevation myocardial infarction) S/P BEAN  Assessment & Plan  Patient presented with left shoulder and arm pain with associated nausea and diaphoresis 45 minutes prior to presentation  The ED, EKG ST elevation in the inferior leads  STEMI alert-loaded with aspirin and Brilinta/heparin drip started on taken to the Cath Lab.  Patient underwent cardiac cath which showed 100% mid RCA stenosis status post stent x1  Currently in the ICU for hemodynamic monitoring  ECHO 2/19-   Left ventricular cavity size is normal. Wall thickness is moderately increased. There is moderate concentric hypertrophy. There is concentric remodeling. The left ventricular ejection fraction is 65% by single dimension measurement. Diastolic function is normal.    Right Ventricle: Right ventricular cavity size is mildly dilated.    Right Atrium: The atrium is mildly dilated.    Mitral Valve: There is mild regurgitation.  RVSP 54, IVC dilated  Plan     Brillinta 90 mg bid, ASA 91 daily Stable for discharge continue dual antiplatelet, statin  Started on metoprolol 12.5 mg twice daily  Started on losartan 25 mg daily  Follow-up with cardiology at discharge  Discussed lifestyle and dietary modifications        PAH (pulmonary artery hypertension) (HCC)  Assessment & Plan  RSVP 54, probably 2/2 left heart disease  Follow up with card and repeat echo accordingly     Gout  Assessment & Plan  Note on allopurinol, uses as needed colchicine  Continue dietary modification    Hyperlipidemia  Assessment & Plan  Lab Results   Component Value Date    CHOLESTEROL 167 02/20/2024    TRIG 363 (H) 02/20/2024    HDL 35 (L) 02/20/2024    LDLCALC 59 02/20/2024          Continue atorvastatin 80  mg      Coronary artery disease involving native coronary artery  Assessment & Plan  As above STEMI  DAPT, statin, losartan 25, metoprolol 12.5 twice daily  Discussed lifestyle changes          Discharging Physician / Practitioner: Luz Maria Conklin MD  PCP: No primary care provider on file.    Admission Date: 2/19/2024  Discharge Date: 02/20/24    Reason for Admission: STEMI    Discharge Diagnoses:   Principal Problem:    STEMI (ST elevation myocardial infarction) S/P BEAN  Active Problems:    Coronary artery disease involving native coronary artery    Status post insertion of drug eluting coronary artery stent    Hyperlipidemia    Gout    PAH (pulmonary artery hypertension) (HCC)  Resolved Problems:    * No resolved hospital problems. *      Consultations During Hospital Stay:  Cardiology     Procedures Performed:     Left heart catheterization, angioplasty with BEAN to mid % occlusion     Significant Findings:   Left heart cath   Left main: Large vessel, minimal luminal irregularities.  LAD: Large vessel. No significant disease reported.  LCx: Large, nondominant.  Mild diffuse disease 4%.  RCA: 100% mid RCA stenosis with MEHREEN 0 flow with thrombotic lesion.  Successful balloon angioplasty and stent placement with 0% residual stenosis.    Cardiac echo  Left ventricular cavity size is normal. Wall thickness is moderately increased. There is moderate concentric hypertrophy. There is concentric remodeling. The left ventricular ejection fraction is 65% by single dimension measurement. Diastolic function is normal.    Right Ventricle: Right ventricular cavity size is mildly dilated.    Right Atrium: The atrium is mildly dilated.    Mitral Valve: There is mild regurgitation.  RVSP 54, IVC dilated  Incidental Findings:   None    Test Results Pending at Discharge (will require follow up):   None      Outpatient Tests Requested:  None     Complications:  none     Hospital Course:     Jerome Coffey is a 68  y.o. male patient with PMH of gout, HLD,  who originally presented to the hospital on 2/19/2024 due to left shoulder and arm pain with associated diaphoresis , nausea. At the ED patient bradycardic , EKG showed ST elevation in inferior leads. Patient was STEMI alert, cardiac cath done showed 100% mid RCA occlusion. Angioplasty done with placement  one BEAN.  Patient was monitored for 24 hours in the ICU without any events. Patient has no complaints, able to ambulate without issues.   Stable for discharge home - with Aspirin, Brillinta 90 mg bid, Atorvastatin 80 daily , metoprolol 12.5 mg bid, losartan 15 mg daily  Discussed lifestyle modification  Discussed weight restriction. Would be referred to cardiac rehab  Patient would follow up with cardiology as scheduled 2/26/2024    Condition at Discharge: stable     Discharge Day Visit / Exam:     * Please refer to separate progress for these details *    Discharge instructions/Information to patient and family:   See after visit summary for information provided to patient and family.      Provisions for Follow-Up Care:  See after visit summary for information related to follow-up care and any pertinent home health orders.      Disposition: Home    Planned Readmission: none        Discharge Medications:  See after visit summary for reconciled discharge medications provided to patient and family.    Discharge Diet: cardiac diet  Activity restrictions: no lifting more than 10LB  pounds for 5 days    Luz Maria Conklin MD  2/20/2024  1:24 PM

## 2024-02-20 NOTE — PROGRESS NOTES
CARDIOLOGY INPATIENT FOLLOW-UP PROGRESS NOTE  *-*-*-*-*-*-*-*-*-*-*-*-*-*-*-*-*-*-*-*-*-*-*-*-*-*-*-*-*-*-*-*-*-*-*-*-*-*-*-*-*-*-*-*-*-*-*-*-*-*-*-*-*-*-  ENCOUNTER DATE: 02/20/24 10:36 AM   AUTHOR: Reji Baum MD  PATIENT: Jerome Coffey   1955    [id number]   68 y.o.   male  INPATIENT HOSPITALIST PHYSICIAN: Mark Lombardo *  DATE OF ADMISSION: 2/19/2024  8:20 AM; LENGTH OF STAY: 1 days  *-*-*-*-*-*-*-*-*-*-*-*-*-*-*-*-*-*-*-*-*-*-*-*-*-*-*-*-*-*-*-*-*-*-*-*-*-*-*-*-*-*-*-*-*-*-*-*-*-*-*-*-*-*-    CARDIOLOGY ASSESSMENT:  Acute coronary syndrome- Type I MI, Inferior ST elevation type  Bradycardia, now resolved  Dyslipidemia  Moderate pulmonary hypertension  Mild ectasia of ascending aorta up  Degenerative joint disease,  Multilevel lumbar spondylosis with spondylolisthesis and associated spondylolysis at L5-S1.   History of gout    *-*-*-*-*-*-*-*-*-*-*-*-*-*-*-*-*-*-*-*-*-*-*-*-*-*-*-*-*-*-*-*-*-*-*-*-*-*-*-*-*-*-*-*-*-*-*-*-*-*-*-*-*-*-  CURRENT SCHEDULED MEDICATIONS:    Current Facility-Administered Medications:     acetaminophen (TYLENOL) tablet 650 mg, 650 mg, Oral, Q4H PRN, Luz Maria Conklin MD, 650 mg at 02/19/24 1441    aspirin chewable tablet 81 mg, 81 mg, Oral, Daily, Luz Maria Conklin MD, 81 mg at 02/20/24 0916    atorvastatin (LIPITOR) tablet 80 mg, 80 mg, Oral, Daily With Dinner, Luz Maria Conklin MD, 80 mg at 02/19/24 1717    chlorhexidine (PERIDEX) 0.12 % oral rinse 15 mL, 15 mL, Mouth/Throat, Q12H CHIN, Luz Maria Conklin MD, 15 mL at 02/20/24 0916    heparin (porcine) subcutaneous injection 5,000 Units, 5,000 Units, Subcutaneous, Q8H CHIN, 5,000 Units at 02/20/24 0502 **AND** Platelet count, , , Once, Luz Maria Conklin MD    nitroglycerin (NITROSTAT) SL tablet 0.4 mg, 0.4 mg, Sublingual, Q5 Min PRN, Luz Maria Conklin MD    Insert peripheral IV, , , Once **AND** sodium chloride (PF) 0.9 % injection 3 mL, 3 mL,  Intravenous, Q1H PRN, Luz Maria Conklin MD    [COMPLETED] ticagrelor (BRILINTA) tablet 180 mg, 180 mg, Oral, Once, 180 mg at 02/19/24 0856 **AND** ticagrelor (BRILINTA) tablet 90 mg, 90 mg, Oral, Q12H CHIN, Luz Maria Conklin MD, 90 mg at 02/20/24 0916    Current Core Cardiac medications:  Aspirin 81 mg daily atorvastatin 80 mg daily ticagrelor 90 twice daily    PERTINENT LABS AND OTHER INVESTIGATIONS:    Labs 02/20/24 : Sodium 1/1/1937 potassium 4.2 chloride 108 bicarb 24 BUN 14 creatinine 0.89 GFR 88 calcium 8.0  Total cholesterol 167 triglyceride 363 HDL 35 calculated LDL 59  Hemoglobin 13.9 hematocrit 40.6 platelet count 180  Hemoglobin A1c BMP and TSH are pending    ECHOCARDIOGRAM AND OTHER CARDIAC TESTS RESULTS:   Echocardiogram 2/19/2024:  Normal left ventricle size, moderate concentric left ventricle hypertrophy, normal left ventricular systolic function, EF 65%, mild hypokinesis of the base of inferior wall.  Normal diastolic function.  Mildly dilated right ventricle with normal right ventricular systolic function.  Normal left atrial size, mildly dilated right atrium.  Normal aortic valve, no aortic valve stenosis or regurgitation.  Normal mitral valve, mild mitral valve regurgitation.  Mild tricuspid valve regurgitation.  No pulmonic valve regurgitation.  Suspected moderate pulmonary hypertension.  Estimated RVSP/history 54 mmHg.  No pericardial effusion.  Dilated aortic root and ascending aorta measuring up to 3.9 cm.    Cardiac catheterization:        Left main: Large vessel, minimal luminal irregularities.  LAD: Large vessel. No significant disease reported.  LCx: Large, nondominant.  Mild diffuse disease 4%.  RCA: 100% mid RCA stenosis with MEHREEN 0 flow with thrombotic lesion.  Successful balloon angioplasty and stent placement with 0% residual stenosis.    *-*-*-*-*-*-*-*-*-*-*-*-*-*-*-*-*-*-*-*-*-*-*-*-*-*-*-*-*-*-*-*-*-*-*-*-*-*-*-*-*-*-*-*-*-*-*-*-*-*-*-*-*-*-  Patient  clinically stable with no evidence of heart failure on exam.  No significant arrhythmia noted on telemetry.  Heart rate is now improved and consistently greater than 60 bpm.  Had detailed discussion with patient and his wife regarding his diagnosis and findings and consequences and what to expect in the future.    PLAN:  -- Request initiating metoprolol tartrate 12.5 mg twice daily and losartan 25 mg once daily.  -- Continue aspirin ticagrelor and atorvastatin at current dose.  -- Patient should ambulate around the unit later today.  If he No symptoms and he feels comfortable then he may be able to be discharged to home this afternoon.  Post MI follow-up appointment made for 2/26/2024.  Patient will be assessed times referred for cardiac rehab.  -- Reviewed with patient and wife about things to watch for and to seek immediate help including but not limited to increasing shortness of breath near syncope lightheadedness chest tightness or any concerning symptoms.    *-*-*-*-*-*-*-*-*-*-*-*-*-*-*-*-*-*-*-*-*-*-*-*-*-*-*-*-*-*-*-*-*-*-*-*-*-*-*-*-*-*-*-*-*-*-*-*-*-*-*-*-*-*-  INTERVAL CHANGES / HISTORY OF PRESENT ILLNESS:   No acute events.  Patient reports experiencing some heartburn feeling last night but this has since resolved.  Had no typical anginal-like chest tightness or shortness of breath or dizziness or lightheadedness.  Has been ambulating without difficulty.  *-*-*-*-*-*-*-*-*-*-*-*-*-*-*-*-*-*-*-*-*-*-*-*-*-*-*-*-*-*-*-*-*-*-*-*-*-*-*-*-*-*-*-*-*-*-*-*-*-*-*-*-*-*    PERTINENT REVIEW OF SYMPTOMS: As noted above in HPI    *-*-*-*-*-*-*-*-*-*-*-*-*-*-*-*-*-*-*-*-*-*-*-*-*-*-*-*-*-*-*-*-*-*-*-*-*-*-*-*-*-*-*-*-*-*-*-*-*-*-*-*-*-*-  VITAL SIGNS:  Vitals:    02/20/24 0500 02/20/24 0548 02/20/24 0600 02/20/24 0804   BP: 119/74  132/73    BP Location:       Pulse: 56  64    Resp: 21  22    Temp:    98.4 °F (36.9 °C)   TempSrc:    Oral   SpO2: 95%  95%    Weight:  117 kg (258 lb 9.6 oz)     Height:          Temp  (24hrs), Av °F (36.7 °C), Min:97.5 °F (36.4 °C), Max:98.8 °F (37.1 °C)  Current: Temperature: 98.4 °F (36.9 °C)  Body mass index is 34.12 kg/m².  Body surface area is 2.4 meters squared.      Intake/Output Summary (Last 24 hours) at 2024 1036  Last data filed at 2024 0430  Gross per 24 hour   Intake --   Output 1700 ml   Net -1700 ml       Weight    24 0835 24 0906 24 1041 24 1145   Weight: 116 kg (256 lb 9.9 oz) 116 kg (256 lb 9.9 oz) 114 kg (251 lb 5.2 oz) 114 kg (251 lb)    24 0548   Weight: 117 kg (258 lb 9.6 oz)       Wt Readings from Last 3 Encounters:   24 117 kg (258 lb 9.6 oz)        *-*-*-*-*-*-*-*-*-*-*-*-*-*-*-*-*-*-*-*-*-*-*-*-*-*-*-*-*-*-*-*-*-*-*-*-*-*-*-*-*-*-*-*-*-*-*-*-*-*-*-*-*-*-  PHYSICAL EXAM:  General Appearance:    Alert, cooperative, in no respiratory distress, appears stated age, tall well-built   Head, Eyes, ENT:    No obvious abnormality, moist mucous mebranes.   Neck:   Supple, no carotid bruit or JVD   Back:     Symmetric, no curvature.   Lungs:     Respirations unlabored. Clear to auscultation bilaterally,    Chest wall:    No tenderness or deformity   Heart:    Regular rate and rhythm, S1 and S2 normal, no murmur, rub  or gallop.   Abdomen:     Soft, non-tender, No obvious masses, or organomegaly   Extremities:   Extremities warm, no cyanosis or edema    Skin:   Skin color, texture, turgor normal, no rashes or lesions     *-*-*-*-*-*-*-*-*-*-*-*-*-*-*-*-*-*-*-*-*-*-*-*-*-*-*-*-*-*-*-*-*-*-*-*-*-*-*-*-*-*-*-*-*-*-*-*-*-*-*-*-*-*-  TELEMETRY, LAST ECG:  Telemetry reviewed. ... Sinus rhythm, no significant ectopy.     Results for orders placed or performed during the hospital encounter of 24   ECG 12 lead   Result Value    Ventricular Rate 52    Atrial Rate 52    MA Interval 208    QRSD Interval 104    QT Interval 450    QTC Interval 418    P Axis 50    QRS Axis -18    T Wave Axis 87    Narrative     Age and gender specific ECG  "analysis   Sinus bradycardia  ST elevation consider inferior injury or acute infarct  ** ** ** * ACUTE MI  ** ** ** **  Abnormal ECG  No previous ECGs available  Confirmed by Pedro Pablo Sanchez ([id number]) on 2/19/2024 12:38:52 PM        *-*-*-*-*-*-*-*-*-*-*-*-*-*-*-*-*-*-*-*-*-*-*-*-*-*-*-*-*-*-*-*-*-*-*-*-*-*-*-*-*-*-*-*-*-*-*-*-*-*-*-*-*-*  LABORATORY DATA:  I have personally reviewed pertinent labs.    CMP:  Results from last 7 days   Lab Units 02/20/24  0502 02/19/24  0837   SODIUM mmol/L 137 141   POTASSIUM mmol/L 4.2 4.1   CHLORIDE mmol/L 108 104   CO2 mmol/L 24 31   BUN mg/dL 14 17   CREATININE mg/dL 0.89 1.16   CALCIUM mg/dL 8.0* 8.9   ALK PHOS U/L  --  55   ALT U/L  --  24   AST U/L  --  27        Results from last 7 days   Lab Units 02/20/24  0935   MAGNESIUM mg/dL 2.1        Cardiac Profile:   Results from last 7 days   Lab Units 02/19/24  0837   HS TNI 0HR ng/L 18         Results from last 7 days   Lab Units 02/20/24  0900   CHOLESTEROL mg/dL 167   TRIGLYCERIDES mg/dL 363*   HDL mg/dL 35*   LDL CALC mg/dL 59            Arterial Blood Gas Analysis:    Venous Blood Gas Analysis:       Invalid input(s): \"PCOVEN\"     CBC:   Results from last 7 days   Lab Units 02/20/24  0502 02/19/24  0909   WBC Thousand/uL 9.45 9.62   HEMOGLOBIN g/dL 13.9 16.0   HEMATOCRIT % 40.6 47.0   PLATELETS Thousands/uL 180 211     PT/INR: No results found for: \"PT\", \"INR\", Microbiology:        *-*-*-*-*-*-*-*-*-*-*-*-*-*-*-*-*-*-*-*-*-*-*-*-*-*-*-*-*-*-*-*-*-*-*-*-*-*-*-*-*-*-*-*-*-*-*-*-*-*-*-*-*-*-  IMAGING STUDIES REPORTS: Imaging studies results reviewed....  No valid procedures specified.  No Chest XR results available for this patient.    *-*-*-*-*-*-*-*-*-*-*-*-*-*-*-*-*-*-*-*-*-*-*-*-*-*-*-*-*-*-*-*-*-*-*-*-*-*-*-*-*-*-*-*-*-*-*-*-*-*-*-*-*-*-  AVAILABLE OLD CARDIAC TESTS REPORTS:   No results found for this or any previous visit.    No results found for this or any previous visit.    No results found for this or any previous " visit.    No results found for this or any previous visit.       *-*-*-*-*-*-*-*-*-*-*-*-*-*-*-*-*-*-*-*-*-*-*-*-*-*-*-*-*-*-*-*-*-*-*-*-*-*-*-*-*-*-*-*-*-*-*-*-*-*-*-*-*-*-  SIGNATURES:   @SIG@   Reji Baum MD

## 2024-02-20 NOTE — ASSESSMENT & PLAN NOTE
Patient presented with left shoulder and arm pain with associated nausea and diaphoresis 45 minutes prior to presentation  The ED, EKG ST elevation in the inferior leads  STEMI alert-loaded with aspirin and Brilinta/heparin drip started on taken to the Cath Lab.  Patient underwent cardiac cath which showed 100% mid RCA stenosis status post stent x1  Currently in the ICU for hemodynamic monitoring  ECHO 2/19-   Left ventricular cavity size is normal. Wall thickness is moderately increased. There is moderate concentric hypertrophy. There is concentric remodeling. The left ventricular ejection fraction is 65% by single dimension measurement. Diastolic function is normal.    Right Ventricle: Right ventricular cavity size is mildly dilated.    Right Atrium: The atrium is mildly dilated.    Mitral Valve: There is mild regurgitation.  RVSP 54, IVC dilated  Plan     Brillinta 90 mg bid, ASA 91 daily Stable for discharge continue dual antiplatelet, statin  Started on metoprolol 12.5 mg twice daily  Started on losartan 25 mg daily  Follow-up with cardiology at discharge  Discussed lifestyle and dietary modifications

## 2024-02-20 NOTE — CASE MANAGEMENT
Case Management Assessment & Discharge Planning Note    Patient name Jerome Coffey  Location ICU 15/ICU 15 MRN [id number]  : 1955 Date 2024       Current Admission Date: 2024  Current Admission Diagnosis:STEMI (ST elevation myocardial infarction) S/P BEAN   Patient Active Problem List    Diagnosis Date Noted    Gout 2024    STEMI (ST elevation myocardial infarction) S/P BEAN 2024    Coronary artery disease involving native coronary artery 2024    Status post insertion of drug eluting coronary artery stent 2024    Hyperlipidemia 2024      LOS (days): 1  Geometric Mean LOS (GMLOS) (days): 2  Days to GMLOS:0.9     OBJECTIVE:    Risk of Unplanned Readmission Score: 6.97         Current admission status: Inpatient       Preferred Pharmacy:   [id number] [place] - [address]  [address] [place] [address]  Phone: [phone] Fax: [phone]    [id number]  [place] - [address] [address]  Phone: [phone] Fax: [phone]    Primary Care Provider: No primary care provider on file.    Primary Insurance: MEDICARE  Secondary Insurance:     ASSESSMENT:  Active Health Care Proxies       Paris Coffey Health Care Representative - Spouse   Primary Phone: [phone] (Mobile)                 Advance Directives  Does patient have a Health Care POA?: No  Was patient offered paperwork?: Yes  Does patient currently have a Health Care decision maker?: Yes, please see Health Care Proxy section  Does patient have Advance Directives?: No  Was patient offered paperwork?: Yes      Patient Information  Admitted from:: Home  Mental Status: Alert  During Assessment patient was accompanied by: Not accompanied during assessment  Assessment information provided by:: Patient  Primary Caregiver: Self  Support Systems: Spouse/significant other  [place] of Residence[facility] city do  you live in?: [place]  Type of Current Residence: 2 story home  Upon entering residence, is there a bedroom on the main floor (no further steps)?: Yes  Upon entering residence, is there a bathroom on the main floor (no further steps)?: Yes  Living Arrangements: Lives w/ Spouse/significant other  Is patient a ?: No    Activities of Daily Living Prior to Admission  Functional Status: Independent  Completes ADLs independently?: Yes  Ambulates independently?: Yes  Does patient use assisted devices?: No  Does patient currently own DME?: No  Does patient have a history of Outpatient Therapy (PT/OT)?: No  Does the patient have a history of Short-Term Rehab?: No  Does patient have a history of HHC?: No  Does patient currently have HHC?: No         Patient Information Continued  Does patient have prescription coverage?: Yes  Does patient receive dialysis treatments?: No  Does patient have a history of substance abuse?: No         Means of Transportation  Means of Transport to [facility], [facility]:: Drives Self      Social Determinants of Health (SDOH)      Flowsheet Row Most Recent Value   Housing Stability    In the last 12 months, was there a time when you were not able to pay the mortgage or rent on time? N   In the last 12 months, was there a time when you did not have a steady place to sleep or slept in a shelter (including now)? N   Transportation Needs    In the past 12 months, has lack of transportation kept you from medical appointments or from getting medications? no   In the past 12 months, has lack of transportation kept you from meetings, work, or from getting things needed for daily living? No   Food Insecurity    Within the past 12 months, you worried that your food would run out before you got the money to buy more. Never true   Within the past 12 months, the food you bought just didn't last and you didn't have money to get more. Never true   Utilities    In the past 12 months has the electric, gas, oil, or water  company threatened to shut off services in your home? No            DISCHARGE DETAILS:    Discharge planning discussed with:: patient        CM contacted family/caregiver?: No- see comments       Requested Home Health Care         Is the patient interested in HHC at discharge?: No    DME Referral Provided  Referral made for DME?: No    Would you like to participate in our [facility] service program?  : No - Declined       Discharge Destination Plan:: Home  Transport at Discharge : Family        Additional Comments: RNCM met with patient to discuss discharge planning. Demogrpahics were verfied. Patient lives at home with wife and is independent at baseline. Patient denies any CM needs. He has RX coverage and utilizes [employer]. Wife to transport home.    PCP Jerome Wiggins

## 2024-02-20 NOTE — ASSESSMENT & PLAN NOTE
Lab Results   Component Value Date    CHOLESTEROL 167 02/20/2024    TRIG 363 (H) 02/20/2024    HDL 35 (L) 02/20/2024    LDLCALC 59 02/20/2024          Continue atorvastatin 80 mg

## 2024-02-20 NOTE — PROGRESS NOTES
[facility]  Progress Note  Name: Jerome Coffey I  MRN: [id number]  Unit/Bed#: ICU 15 I Date of Admission: 2/19/2024   Date of Service: 2/20/2024 I Hospital Day: 1    Assessment/Plan   * STEMI (ST elevation myocardial infarction) S/P BEAN  Assessment & Plan  Patient presented with left shoulder and arm pain with associated nausea and diaphoresis 45 minutes prior to presentation  The ED, EKG ST elevation in the inferior leads  STEMI alert-loaded with aspirin and Brilinta/heparin drip started on taken to the Cath Lab.  Patient underwent cardiac cath which showed 100% mid RCA stenosis status post stent x1  Currently in the ICU for hemodynamic monitoring  ECHO 2/19-   Left ventricular cavity size is normal. Wall thickness is moderately increased. There is moderate concentric hypertrophy. There is concentric remodeling. The left ventricular ejection fraction is 65% by single dimension measurement. Diastolic function is normal.    Right Ventricle: Right ventricular cavity size is mildly dilated.    Right Atrium: The atrium is mildly dilated.    Mitral Valve: There is mild regurgitation.  RVSP 54, IVC dilated  Plan   Appreciate cardiology  Continue dual antiplatelets  Atorvastatin 80 mg  Follow-up lipid panel  Hold beta-blocker for now given bradycardia-consider restarting          Gout  Assessment & Plan  Note on allopurinol, uses as needed colchicine  Continue dietary modification    Hyperlipidemia  Assessment & Plan  Continue atorvastatin 80 mg  Follow-up updated lipid panel    Coronary artery disease involving native coronary artery  Assessment & Plan  As above STEMI  DAPT, statin, consider starting beta-blocker  Continue dual antiplatelets and follow-up labs  Discussed lifestyle changes             ----------------------------------------------------------------------------------------  HPI/24hr events: no events overnight, no events on the monitor    Patient appropriate for transfer out of  the ICU today?: Patient does not meet criteria for ICU Follow-up Clinic; referral has not been made.   Disposition: Transfer to [facility] with Telemetry   Code Status: Level 1 - Full Code  ---------------------------------------------------------------------------------------  SUBJECTIVE  Patient seen at bedside, offers no complaints    Review of Systems   Constitutional:  Negative for chills and fever.   HENT:  Negative for ear pain and sore throat.    Eyes:  Negative for pain and visual disturbance.   Respiratory:  Negative for cough and shortness of breath.    Cardiovascular:  Negative for chest pain and palpitations.   Gastrointestinal:  Negative for abdominal pain and vomiting.   Genitourinary:  Negative for dysuria and hematuria.   Musculoskeletal:  Negative for arthralgias and back pain.   Skin:  Negative for color change and rash.   Neurological:  Negative for seizures and syncope.   All other systems reviewed and are negative.    Review of systems was reviewed and negative unless stated above in HPI/24-hour events   ---------------------------------------------------------------------------------------  OBJECTIVE    Vitals   Vitals:    24 0400 24 0500 24 0548 24 0600   BP: 128/73 119/74  132/73   BP Location: Left arm      Pulse: (!) 54 56  64   Resp: 16 21  22   Temp:       TempSrc:       SpO2: 94% 95%  95%   Weight:   117 kg (258 lb 9.6 oz)    Height:         Temp (24hrs), Av.9 °F (36.6 °C), Min:97.5 °F (36.4 °C), Max:98.8 °F (37.1 °C)  Current: Temperature: 97.8 °F (36.6 °C)          Respiratory:  SpO2: SpO2: 95 %       Invasive/non-invasive ventilation settings   Respiratory      Lab Data (Last 4 hours)      None           O2/Vent Data (Last 4 hours)      None                    Physical Exam        Laboratory and Diagnostics:  Results from last 7 days   Lab Units 24  0502 24  0909   WBC Thousand/uL 9.45 9.62   HEMOGLOBIN g/dL 13.9 16.0   HEMATOCRIT % 40.6 47.0  "  PLATELETS Thousands/uL 180 211   NEUTROS PCT %  --  54   MONOS PCT %  --  7   EOS PCT %  --  2     Results from last 7 days   Lab Units 02/20/24  0502 02/19/24  0837   SODIUM mmol/L 137 141   POTASSIUM mmol/L 4.2 4.1   CHLORIDE mmol/L 108 104   CO2 mmol/L 24 31   ANION GAP mmol/L 5 6   BUN mg/dL 14 17   CREATININE mg/dL 0.89 1.16   CALCIUM mg/dL 8.0* 8.9   GLUCOSE RANDOM mg/dL 100 153*   ALT U/L  --  24   AST U/L  --  27   ALK PHOS U/L  --  55   ALBUMIN g/dL  --  4.2   TOTAL BILIRUBIN mg/dL  --  0.52          Results from last 7 days   Lab Units 02/19/24  0837   INR  0.93   PTT seconds 26              ABG:    VBG:          Micro        EKG: no changes   Imaging:  none     Intake and Output  I/O         02/18 0701  02/19 0700 02/19 0701 02/20 0700    Urine (mL/kg/hr)  1700    Total Output  1700    Net  -1700                  Height and Weights   Height: 6' 1\" (185.4 cm)  IBW (Ideal Body Weight): 79.9 kg  Body mass index is 34.12 kg/m².  Weight (last 2 days)       Date/Time Weight    02/20/24 0548 117 (258.6)    02/19/24 1145 114 (251)    02/19/24 1041 114 (251.32)    02/19/24 09:06:03 116 (256.62)    02/19/24 0835 116 (256.62)              Nutrition       Diet Orders   (From admission, onward)                 Start     Ordered    02/19/24 1344  Diet Cardiovascular; Cardiac Cath  Diet effective now        References:    Adult Nutrition Support Algorithm    RD Therapeutic Diet Order Protocol   Question Answer Comment   Diet Type Cardiovascular    Cardiac Cardiac Cath    RD to adjust diet per protocol? Yes        02/19/24 1349                      Active Medications  Scheduled Meds:  Current Facility-Administered Medications   Medication Dose Route Frequency Provider Last Rate    acetaminophen  650 mg Oral Q4H PRN Luz Maria Conklin MD      aspirin  81 mg Oral Daily Luz Maria Conklin MD      atorvastatin  80 mg Oral Daily With Dinner Luz Maria Conklin MD      chlorhexidine  15 mL " "Mouth/Throat Q12H [facility] Luz Maria Conklin MD      heparin (porcine)  5,000 Units Subcutaneous Q8H [facility] Luz Maria Conklin MD      nitroglycerin  0.4 mg Sublingual Q5 Min PRN Luz Maria Conklin MD      sodium chloride (PF)  3 mL Intravenous Q1H PRN Luz Maria Conklin MD      ticagrelor  90 mg Oral Q12H [facility] Luz Maria Conklin MD       Continuous Infusions:     PRN Meds:   acetaminophen, 650 mg, Q4H PRN  nitroglycerin, 0.4 mg, Q5 Min PRN  sodium chloride (PF), 3 mL, Q1H PRN        Invasive Devices Review  Invasive Devices       Peripheral Intravenous Line  Duration             Peripheral IV 02/19/24 Left;Ventral (anterior) Forearm <1 day    Peripheral IV 02/19/24 Right;Ventral (anterior) Hand <1 day                    Rationale for remaining devices:   ---------------------------------------------------------------------------------------  Advance Directive and Living Will:      Power of :    POLST:    ---------------------------------------------------------------------------------------        Luz Maria Conklin MD      Portions of the record may have been created with voice recognition software.  Occasional wrong word or \"sound a like\" substitutions may have occurred due to the inherent limitations of voice recognition software.  Read the chart carefully and recognize, using context, where substitutions have occurred     "

## 2024-02-26 ENCOUNTER — OFFICE VISIT (OUTPATIENT)
Dept: CARDIOLOGY CLINIC | Facility: CLINIC | Age: 69
End: 2024-02-26
Payer: MEDICARE

## 2024-02-26 VITALS
DIASTOLIC BLOOD PRESSURE: 70 MMHG | WEIGHT: 244 LBS | BODY MASS INDEX: 32.19 KG/M2 | HEART RATE: 55 BPM | SYSTOLIC BLOOD PRESSURE: 110 MMHG

## 2024-02-26 DIAGNOSIS — I25.10 CORONARY ARTERY DISEASE INVOLVING NATIVE CORONARY ARTERY: ICD-10-CM

## 2024-02-26 DIAGNOSIS — E78.2 MIXED HYPERLIPIDEMIA: ICD-10-CM

## 2024-02-26 DIAGNOSIS — I21.3 STEMI (ST ELEVATION MYOCARDIAL INFARCTION) (HCC): Primary | ICD-10-CM

## 2024-02-26 DIAGNOSIS — I27.21 PAH (PULMONARY ARTERY HYPERTENSION) (HCC): ICD-10-CM

## 2024-02-26 PROCEDURE — 93000 ELECTROCARDIOGRAM COMPLETE: CPT | Performed by: PHYSICIAN ASSISTANT

## 2024-02-26 PROCEDURE — 99214 OFFICE O/P EST MOD 30 MIN: CPT | Performed by: PHYSICIAN ASSISTANT

## 2024-02-26 RX ORDER — LOSARTAN POTASSIUM 25 MG/1
25 TABLET ORAL DAILY
Qty: 90 TABLET | Refills: 3 | Status: SHIPPED | OUTPATIENT
Start: 2024-02-26

## 2024-02-26 RX ORDER — ATORVASTATIN CALCIUM 80 MG/1
80 TABLET, FILM COATED ORAL
Qty: 90 TABLET | Refills: 3 | Status: SHIPPED | OUTPATIENT
Start: 2024-02-26

## 2024-02-26 NOTE — PROGRESS NOTES
Cardiology Follow Up    [facility]  [address] [place] [id number]  PHONE: [phone]  FAX: [phone]      Jerome Coffey  1955  [id number]      Assessment/Plan:    CAD with history of recent STEMI  Presented 2/19/2024 to [facility] emergency room complaining of heartburn pain, diagnosed with STEMI.  Underwent cardiac catheterization with BEAN to Mid RCA lesion which was 100% stenosed.  The stent was CLEMENCIA FRONTIER 3.5MM X 18MM inflated to 12 erica.  Brillinta 90 mg bid, ASA 81 daily  BP is stable on Lopressor 12.5 mg twice daily & Losartan 25 mg daily  We discussed setting up cardiac rehab - they have the phone number to call.    Obesity  Losing weight is the number one thing this patient can do to avoid future ASCVD. We discussed his weight loss goals and dieting to avoid excess calories, adequate sleep, stress reduction and after he has completed his supervised cardiac rehab program, gradually increasing cardiovascular aerobic exercise.    Hyperlipidemia  Lipitor 80 mg QD & fish oil 1,000 mg QD  Recheck Lipid panel in ~6 weeks     Already has his next office appt with Dr. Baum set up for 4/2/2024.    Interval History:   Jerome Coffey is a 68 y.o. male with past medical history of obesity, gout, back pain, lipoma, hyperlipidemia.  He presents today in hospital follow-up from recent hospitalization 2/19 - 2/20/2024 whereby he was diagnosed with STEMI and treated with BEAN to the RCA.  Patient did have bradycardia which resolved prior to discharge.  Since his discharge patient reports he has been feeling well, no chest pain, fatigue, dyspnea on exertion, palpitations, lightheadedness or presyncope.    He has been compliant with the medications prescribed to him on discharge.    Review of Systems   Constitutional: Negative for malaise/fatigue.   Cardiovascular:  Negative for chest pain, claudication and near-syncope.   Respiratory:  Negative for cough,  shortness of breath and sleep disturbances due to breathing.    Hematologic/Lymphatic:        No abnormal bleeding   Musculoskeletal:  Negative for back pain.   Neurological:  Negative for dizziness and light-headedness.       Medical Problems       Problem List       H/o STEMI S/P BEAN    Coronary artery disease involving native coronary artery    Status post insertion of drug eluting coronary artery stent    Hyperlipidemia    Gout    PAH (pulmonary artery hypertension) (HCC)         Past Medical History:   Diagnosis Date    Erectile dysfunction     Knee pain, right     Renal calculi       Past Surgical History:   Procedure Laterality Date    CARDIAC CATHETERIZATION N/A 2/19/2024    Procedure: Cardiac pci;  Surgeon: Tl Dominique MD;  Location: [facility] CARDI[facility];  Service: Cardiology      No family history on file.   Social History     Socioeconomic History    Marital status: /Civil Union     Spouse name: Not on file    Number of children: Not on file    Years of education: Not on file    Highest education level: Not on file   Occupational History    Not on file   Tobacco Use    Smoking status: Never    Smokeless tobacco: Never   Vaping Use    Vaping status: Never Used   Substance and Sexual Activity    Alcohol use: Yes     Comment: social drinker    Drug use: Never    Sexual activity: Not on file   Other Topics Concern    Not on file   Social History Narrative    Not on file     Social Determinants of Health     Financial Resource Strain: Not on file   Food Insecurity: No Food Insecurity (2/20/2024)    Hunger Vital Sign     Worried About Running Out of Food in the Last Year: Never true     Ran Out of Food in the Last Year: Never true   Transportation Needs: No Transportation Needs (2/20/2024)    PRAPARE - Transportation     Lack of Transportation (Medical): No     Lack of Transportation (Non-Medical): No   Physical Activity: Not on file   Stress: Not on file   Social Connections: Not on file   Intimate  Partner Violence: Not on file   Housing Stability: Low Risk  (2/20/2024)    Housing Stability Vital Sign     Unable to Pay for Housing in the Last Year: No     Number of Places Lived in the Last Year: 1     Unstable Housing in the Last Year: No        Current Outpatient Medications:     aspirin 81 mg chewable tablet, Chew 1 tablet (81 mg total) daily, Disp: 60 tablet, Rfl: 0    atorvastatin (LIPITOR) 80 mg tablet, Take 1 tablet (80 mg total) by mouth daily with dinner, Disp: 90 tablet, Rfl: 3    Boswellia-Glucosamine-Vit D (Osteo Bi-Flex One Per Day) TABS, Take by mouth, Disp: , Rfl:     colchicine (COLCRYS) 0.6 mg tablet, , Disp: , Rfl:     colchicine (COLCRYS) 0.6 mg tablet, One tid prn gout, Disp: 21 tablet, Rfl: 0    Ergocalciferol (VITAMIN D2 PO), Take by mouth, Disp: , Rfl:     Glucosamine 750 MG TABS, Take 2 tablets by mouth daily, Disp: , Rfl:     losartan (COZAAR) 25 mg tablet, Take 1 tablet (25 mg total) by mouth daily, Disp: 90 tablet, Rfl: 3    metoprolol tartrate (LOPRESSOR) 25 mg tablet, Take 0.5 tablets (12.5 mg total) by mouth every 12 (twelve) hours, Disp: 90 tablet, Rfl: 3    Multiple Vitamins-Minerals (MENS MULTIVITAMIN PO), , Disp: , Rfl:     nitroglycerin (NITROSTAT) 0.4 mg SL tablet, Place 1 tablet (0.4 mg total) under the tongue every 5 (five) minutes as needed for chest pain, Disp: 100 tablet, Rfl: 0    Omega-3 Fatty Acids (Fish Oil Omega-3) 1000 MG CAPS, Take 2 g by mouth daily, Disp: , Rfl:     ticagrelor (BRILINTA) 90 MG, Take 1 tablet (90 mg total) by mouth every 12 (twelve) hours, Disp: 60 tablet, Rfl: 11    ticagrelor (BRILINTA) 90 MG, Take 1 tablet (90 mg total) by mouth every 12 (twelve) hours (Patient not taking: Reported on 2/26/2024), Disp: 180 tablet, Rfl: 0     Allergies   Allergen Reactions    Clindamycin GI Intolerance       Physical Exam:  Vitals:    02/26/24 0957   BP: 110/70   Pulse: 55     Vitals:    02/26/24 0957   Weight: 111 kg (244 lb)         Body mass index is 32.19  kg/m².    Physical Exam  Vitals and nursing note reviewed.   Constitutional:       General: He is not in acute distress.  HENT:      Head: Normocephalic and atraumatic.      Nose: Nose normal.      Mouth/Throat:      Mouth: Mucous membranes are moist.   Eyes:      Conjunctiva/sclera: Conjunctivae normal.   Neck:      Comments: No carotid bruits bilaterally  Cardiovascular:      Comments: Normal S1 and S2, rhythm is regular, heart rate is 60 bpm during my exam.  No murmur  Pulmonary:      Effort: Pulmonary effort is normal.      Breath sounds: No wheezing, rhonchi or rales.   Abdominal:      General: Abdomen is flat.   Musculoskeletal:         General: No swelling.   Skin:     General: Skin is warm and dry.   Neurological:      Mental Status: He is alert and oriented to person, place, and time.   Psychiatric:         Behavior: Behavior normal.         Data:  Labs:     Chemistry        Component Value Date/Time    K 4.2 02/20/2024 0502    K 4.2 12/21/2023 0648     02/20/2024 0502     12/21/2023 0648    CO2 24 02/20/2024 0502    CO2 31 12/21/2023 0648    BUN 14 02/20/2024 0502    BUN 16 12/21/2023 0648    CREATININE 0.89 02/20/2024 0502    CREATININE 1.09 12/21/2023 0648        Component Value Date/Time    CALCIUM 8.0 (L) 02/20/2024 0502    CALCIUM 9.3 12/21/2023 0648    ALKPHOS 55 02/19/2024 0837    ALKPHOS 54 12/21/2023 0648    AST 27 02/19/2024 0837    AST 18 12/21/2023 0648    ALT 24 02/19/2024 0837    ALT 25 12/21/2023 0648            Lab Results   Component Value Date    HDL 35 (L) 02/20/2024    LDLCALC 59 02/20/2024    TRIG 363 (H) 02/20/2024     Lab Results   Component Value Date    HGBA1C 5.8 (H) 02/20/2024             Cardiac catheterization: Result Date: 2/19/2024  Narrative:   Mid RCA lesion is 100% stenosed.   The angioplasty was pre-stent angioplasty. 1v CAD       ECHO: Result Date: 2/19/2024  Narrative:   Left Ventricle: Left ventricular cavity size is normal. Wall thickness is moderately  increased. There is moderate concentric hypertrophy. There is concentric remodeling. The left ventricular ejection fraction is 65% by single dimension measurement. Diastolic function is normal.   Right Ventricle: Right ventricular cavity size is mildly dilated.   Right Atrium: The atrium is mildly dilated.   Mitral Valve: There is mild regurgitation.   Tricuspid Valve: There is mild regurgitation. The right ventricular systolic pressure is moderately elevated. The estimated right ventricular systolic pressure is 54.00 mmHg.   Aorta: The aortic root is normal in size. The ascending aorta is normal in size. The aortic root is 3.80 cm. The ascending aorta is 3.9 cm. BSA 2.42.     EC2024 Sinus bradycardia hr 58 BPM non specific st changes from old mi        Johanna Leigh PA-C  [facility]

## 2024-03-06 ENCOUNTER — CLINICAL SUPPORT (OUTPATIENT)
Dept: CARDIAC REHAB | Facility: CLINIC | Age: 69
End: 2024-03-06
Payer: MEDICARE

## 2024-03-06 DIAGNOSIS — I21.3 ST ELEVATION MYOCARDIAL INFARCTION (STEMI), UNSPECIFIED ARTERY (HCC): Primary | ICD-10-CM

## 2024-03-06 DIAGNOSIS — I21.3 STEMI (ST ELEVATION MYOCARDIAL INFARCTION) (HCC): ICD-10-CM

## 2024-03-06 NOTE — PROGRESS NOTES
"CARDIAC REHAB ASSESSMENT    Today's date: 2024  Patient name: Jerome Coffey     : 1955       MRN: [id number]  PCP: No primary care provider on file.  Referring Physician: Luz Maria Coleman  Cardiologist: Reji Baum MD  Dx:   Encounter Diagnosis   Name Primary?    STEMI (ST elevation myocardial infarction) ([facility])        Date of onset: 24  Cultural needs: None    Weight    Wt Readings from Last 1 Encounters:   24 111 kg (244 lb)      Height:   Ht Readings from Last 1 Encounters:   24 6' 1\" (1.854 m)     Medical History:   Past Medical History:   Diagnosis Date    Erectile dysfunction     Knee pain, right     Renal calculi        Physical Limitations: R knee pain, low back pain with hills on TM, bilateral labrum tears (PT exercises)    Fall Risk: Low   Comments: Ambulates with a steady gait with no assist device and Denies a fall in the past 6 months    Anginal Equivalent:  L arm pain  (reports severe headaches and dizziness the week prior and 2 transient 'black outs')   NTG use: Compliant with carrying NTG, Understands proper use, and Pt has not used NTG since event    Risk Factors   Cholesterol: Yes  Smoking: Never used  HTN: No  DM: No  Obesity: Yes   Inactivity: No  Stress:  perceived  stress: 2/10    Family History:No family history on file.    Allergies: Clindamycin    ETOH:   Social History     Substance and Sexual Activity   Alcohol Use Yes    Comment: social drinker       Current Medications:   Current Outpatient Medications   Medication Sig Dispense Refill    aspirin 81 mg chewable tablet Chew 1 tablet (81 mg total) daily 60 tablet 0    atorvastatin (LIPITOR) 80 mg tablet Take 1 tablet (80 mg total) by mouth daily with dinner 90 tablet 3    Boswellia-Glucosamine-Vit D (Osteo Bi-Flex One Per Day) TABS Take by mouth      colchicine (COLCRYS) 0.6 mg tablet       colchicine (COLCRYS) 0.6 mg tablet One tid prn gout 21 tablet 0    Ergocalciferol (VITAMIN D2 PO) Take by " mouth      Glucosamine 750 MG TABS Take 2 tablets by mouth daily      losartan (COZAAR) 25 mg tablet Take 1 tablet (25 mg total) by mouth daily 90 tablet 3    metoprolol tartrate (LOPRESSOR) 25 mg tablet Take 0.5 tablets (12.5 mg total) by mouth every 12 (twelve) hours 90 tablet 3    Multiple Vitamins-Minerals (MENS MULTIVITAMIN PO)       nitroglycerin (NITROSTAT) 0.4 mg SL tablet Place 1 tablet (0.4 mg total) under the tongue every 5 (five) minutes as needed for chest pain 100 tablet 0    Omega-3 Fatty Acids (Fish Oil Omega-3) 1000 MG CAPS Take 2 g by mouth daily      ticagrelor (BRILINTA) 90 MG Take 1 tablet (90 mg total) by mouth every 12 (twelve) hours 60 tablet 11    ticagrelor (BRILINTA) 90 MG Take 1 tablet (90 mg total) by mouth every 12 (twelve) hours (Patient not taking: Reported on 2/26/2024) 180 tablet 0     No current facility-administered medications for this visit.         Functional Status Prior to Diagnosis for Treatment   Occupation: retired  Recreation: fishing, yard work  ADL’s: No limitations  [place]: No limitations  Exercise: PF 5 days/week, 30 min 3 days/week elliptical, 20 min 2 days/week     Current Functional Status  Occupation: retired  Recreation: fishing (plans to avoid going alone)  ADL’s:resumed all ADLs  [place]: resumed all ADLs  Exercise: None since event  Home exercise equipment: None      Patient Specific Goals:     EXERCISE GOALS (home exercise, ADLs):   Return to the gym with confidence  Increase aerobic exercise, minimize muscular hypertrophy   NUTRITION GOALS (wt control, diabetes management, dietary modifications):   Weight loss 220 lb   PSYCHOCOSOCIAL GOALS (stress, emotional well being, social support):   Reduced anxiety about new meds and health condition   CORE COMPONENT GOALS (smoking, BP control, angina control, medication):   Reduced sodium intake       Ability to reach goals/rehabilitation potential:  Very Good     Projected return to function: 6-8  weeks  Objective tests: sub-max TM ETT      Nutritional   Reviewed details of Rate your Plate. Discussed key elements of heart healthy eating. Reviewed patient goals for dietary modifications and their clinical implications.  Reviewed most recent lipid profile.     Goals for dietary modification (based on Rate Your Plate Dietary Assessment)   TRG <150, reduced waist circumference <40 inches (M), Improved Rate Your Plate score  >64, and Wt. loss 1 ppw,  goal of 220 lbs.    Psychosocial Assessment as it relates to rehabilitation  Are there any aspects of the patient's family and home situation that will affect their rehabilitation?  no    Assessment of depression and anxiety   Patient reports they are coping well with good social support and denies depression or anxiety    Psychosocial Evaluation    PHQ-9: 1  1-4 = Minimal Depression  KATERINA-7: 1  0-4  = Not anxious       Marital status:   Social Support: children and grandchildren    Domestic Violence Screening: No

## 2024-03-06 NOTE — PROGRESS NOTES
Cardiac Rehabilitation Plan of Care   Initial Care Plan        Today's date: 3/6/2024   # of Exercise Sessions Completed: 1  Patient name: Jerome Coffey      : 1955  Age: 68 y.o.       MRN: [id number]  Referring Physician: Luz Maria Coleman*  Cardiologist: Reji Baum MD    Provider: [place]  Clinician: Briana Peterson MS, CEP    Dx:   Encounter Diagnosis   Name Primary?    STEMI (ST elevation myocardial infarction) (HCC)        Description of Diagnosis:  BEAN to Mid RCA lesion which was 100% stenosed.  The stent was CLEMENCIA FRONTIER 3.5MM X 18MM    Date of onset: 24        Dr. Baum,   Please review the following patient assessment for Jerome to begin cardiac rehabilitation post STEMI/BEAN.  Please verify you agree with the outlined plan of care and exercise prescription by signing with attached order.    Thank You.      SUMMARY OF PROGRESS:  Today is Jerome's initial evaluation to begin Cardiac Rehab now 2 wks post STEMI/BEAN to 100% stenosis of midRCA. Jerome has a history of HLD, gout, obesity, pulmonary hypertension, chronic back pain, ED, and R knee pain. He had been exercising regularly 5 days/week prior to his MI but has not been to the gym since. He and his wife also were going for walks 4-5 times/week for up to 30 min when the weather is nice.  He has resumed all ADLs without limitations. Today, Jerome completed an initial submaximal TM ETT wearing a back brace wrap. He completed 3 minutes of stage 5 (7.5 METs) with test termination of RPE 6. Resting HR 59 bpm and resting  BP of 126/84 with appropriate hemodynamic response to exercise; BP reaching 132/84 - 176/100 and peak HR of 108 bpm. Jerome denied symptoms during exercise. Telemetry revealed NSR with rare PVC. Jerome was counseled on exercise guidelines and the goal to achieve a minimum of 150 mins/wk of moderate intensity (RPE 4-6) exercise per AHA guidelines. We also discussed current dietary habits and goals of heart healthy eating for  lipid management and weight loss. He has already started reading his food labels and cutting back on sodium intake. Depression and anxiety were assessed with PHQ-9 and KATERINA-7 questionnaires with scores of 1 each, suggesting  1-4 = Minimal Depression and  0-4  = Not anxious. When addressed, Jerome admits to slight depression/anxiety symptoms about his health and new medications. He does report very good social/emotional support from his wife, children, and grandchildren. Jerome will attend group education classes on heart healthy eating, reading food labels, stress management, risk factor reduction, understanding heart disease and common heart medications.  He will attend 35 monitored exercise sessions, 3x/wk for 12-18 weeks beginning Friday, March 8.       Medication compliance: Yes   Comments: Pt reports to be compliant with medications    Fall Risk: Low   Comments: Ambulates with a steady gait with no assist device and Denies a fall in the past 6 months      EXERCISE ASSESSMENT and PLAN    ECG Interpretation: NSR, rare PVC    SMART Goals:   10% improvement in functional capacity based on max METs achieved in initial fitness assessment  improved DASI score by 10%  increased exercise capacity by 40% based on peak METs tolerated in cardiac rehab exercise session  maintain > 150 minutes per week of moderate intensity exercise    Patient Specific EXERCISE GOALS:   (home exercise, ADLs, recreation):   return to gym with increased confidence - focusing on aerobic exercise, minimizing muscular hypertrophy    Patient's progress toward SMART and personal goals: Reviewed goals today    Plan For next 30 days: education on home exercise guidelines, Group class: Risk Factors for Heart Disease, and begin cardiac rehab exercise    Current Aerobic Exercise Prescription:      Frequency: 3 days/week       Minutes: 30 - 40         METS: 4-6.5            HR:    RPE: 4-6         Modalities: Treadmill, UBE, Lifecycle, and  Elliptical     Exercise workloads will be progressed gradually as tolerated, within limits of patient's ability, and according to the patient's response to the exercise program.      30 Day Goals for Aerobic Exercise Progression:    Frequency: 3 days/week of cardiac rehab       Supplement with home exercise 2+ days/wk as tolerated    Minutes: 40 -45                           >150 mins/wk of moderate intensity exercise   METS: 5-7   HR: 106-125 bpm (50-70% HRR)     RPE: 4-6   Modalities: Treadmill, UBE, Lifecycle, Elliptical, and Rower    Strength training:  Will be added following 2-3 weeks of monitored exercise sessions   Modalities: Leg Press, Chest Press, Pull Downs, Arm Extension, Arm Curl, Front Raises, Shoulder Shrugs, Calf Raises, and Continue PT shoulder exercises at home    Home Exercise: none    Group and Individual Education: benefit of exercise for CAD risk factors, home exercise guidelines, AHA guidelines to achieve >150 mins/wk of moderate exercise, and RPE scale       Readiness to change: Preparation:  (Getting ready to change)       NUTRITION ASSESSMENT AND PLAN    Weight control:    Starting weight: 246.4 lb   Current weight:       Waist circumference:    Startin in   Current:      Diabetes: N/A  A1c: 5.8%    last measured: 24    Lipid management: Discussed diet and lipid management and Last lipid profile 24  Chol 167    HDL 35  LDL 59    SMART Goals:   TRG <150, reduced waist circumference <40 inches (M), and Improved Rate Your Plate score  >64    Patient Specific NUTRITION GOALS:  (wt control, diabetes management, dietary modifications): weight loss 220 lbs    Patient's progress toward SMART and personal goals: reduced sodium intake, reading food labels    Plan for next 30 days: group class: Reading Food Labels and group Class: Heart Healthy Eating    Group and Individual Education: heart healthy eating principles  low sodium diet  nutrition for  lipid management    Readiness  to change: Action:  (Changing behavior)      PSYCHOSOCIAL ASSESSMENT AND PLAN    Emotional:  Depression assessment:  PHQ-9 = 1  1-4 = Minimal Depression            Anxiety measure:  KATERINA-7 = 1  0-4  = Not anxious    Assessment of depression and anxiety    Patient reports feelings of depression /anxiety since his MI  Reports sufficient emotional support from family    Self-reported stress level:  3   Stress Management: Practice Relaxation Techniques and Exercise    Patient's rating of Social support: Very Good   Social Support Network: spouse, children, and grandchildren    Psychosocial Assessment as it relates to rehabilitation:   Patient denies issues with his/her family or home life that may affect their rehabilitation efforts.     SMART Goals:      Increased interest and pleasure in doing things and reduced time feeling nervous or on edge    Patient Specific PSYCHOCOSOCIAL GOALS:  (stress, emotional well being, social support):   reduced anxiety about health    Patient's progress toward SMART and personal goals: utilizing good support from family, reviewed goals today    Plan For next 30 days: Class: Stress and Your Health, Practice relaxation techniques, Exercise, Keep a positive mindset, and Enjoy family    Group and Individual Education: benefits of a positive support system and depression and CAD    Readiness to change: Preparation:  (Getting ready to change)       OTHER CORE COMPONENTS     Tobacco:   Social History     Tobacco Use   Smoking Status Never   Smokeless Tobacco Never       Tobacco Use Intervention:   N/A:  Patient is a non-smoker     Anginal Symptoms:   Arm pain   NTG use: Compliant with carrying NTG, Understands proper use, and Pt has not used NTG since event    Blood pressure:    Restin/84   Exercise: 132/84 - 176/100    SMART Goals: consistent, controlled resting BP < 130/80      Patient Specific CORE COMPONENT GOALS (smoking, BP control, angina control, medication): reduced dietary  sodium <2000mg and medication compliance    Patient's progress toward SMART and personal goals: using pill box to organize meds and timer to remember    Plan For next 30 days: medication compliance, avoid processed foods, engage in regular exercise, and monitor home BP    Group and Individual Education:  understanding high blood pressure and it's relationship to CAD    Readiness to change: Preparation:  (Getting ready to change)

## 2024-03-08 ENCOUNTER — CLINICAL SUPPORT (OUTPATIENT)
Dept: CARDIAC REHAB | Facility: CLINIC | Age: 69
End: 2024-03-08
Payer: MEDICARE

## 2024-03-08 DIAGNOSIS — I21.3 ST ELEVATION MYOCARDIAL INFARCTION (STEMI), UNSPECIFIED ARTERY (HCC): Primary | ICD-10-CM

## 2024-03-08 PROCEDURE — 93798 PHYS/QHP OP CAR RHAB W/ECG: CPT

## 2024-03-11 ENCOUNTER — CLINICAL SUPPORT (OUTPATIENT)
Dept: CARDIAC REHAB | Facility: CLINIC | Age: 69
End: 2024-03-11
Payer: MEDICARE

## 2024-03-11 DIAGNOSIS — I21.3 ST ELEVATION MYOCARDIAL INFARCTION (STEMI), UNSPECIFIED ARTERY (HCC): Primary | ICD-10-CM

## 2024-03-11 PROCEDURE — 93798 PHYS/QHP OP CAR RHAB W/ECG: CPT

## 2024-03-13 ENCOUNTER — CLINICAL SUPPORT (OUTPATIENT)
Dept: CARDIAC REHAB | Facility: CLINIC | Age: 69
End: 2024-03-13
Payer: MEDICARE

## 2024-03-13 DIAGNOSIS — I21.3 ST ELEVATION MYOCARDIAL INFARCTION (STEMI), UNSPECIFIED ARTERY (HCC): Primary | ICD-10-CM

## 2024-03-13 PROCEDURE — 93798 PHYS/QHP OP CAR RHAB W/ECG: CPT

## 2024-03-15 ENCOUNTER — CLINICAL SUPPORT (OUTPATIENT)
Dept: CARDIAC REHAB | Facility: CLINIC | Age: 69
End: 2024-03-15
Payer: MEDICARE

## 2024-03-15 DIAGNOSIS — I21.3 ST ELEVATION MYOCARDIAL INFARCTION (STEMI), UNSPECIFIED ARTERY (HCC): Primary | ICD-10-CM

## 2024-03-15 PROCEDURE — 93798 PHYS/QHP OP CAR RHAB W/ECG: CPT

## 2024-03-18 ENCOUNTER — CLINICAL SUPPORT (OUTPATIENT)
Dept: CARDIAC REHAB | Facility: CLINIC | Age: 69
End: 2024-03-18
Payer: MEDICARE

## 2024-03-18 DIAGNOSIS — I21.3 ST ELEVATION MYOCARDIAL INFARCTION (STEMI), UNSPECIFIED ARTERY (HCC): Primary | ICD-10-CM

## 2024-03-18 PROCEDURE — 93798 PHYS/QHP OP CAR RHAB W/ECG: CPT

## 2024-03-20 ENCOUNTER — CLINICAL SUPPORT (OUTPATIENT)
Dept: CARDIAC REHAB | Facility: CLINIC | Age: 69
End: 2024-03-20
Payer: MEDICARE

## 2024-03-20 DIAGNOSIS — I21.3 ST ELEVATION MYOCARDIAL INFARCTION (STEMI), UNSPECIFIED ARTERY (HCC): Primary | ICD-10-CM

## 2024-03-20 PROCEDURE — 93798 PHYS/QHP OP CAR RHAB W/ECG: CPT

## 2024-03-22 ENCOUNTER — CLINICAL SUPPORT (OUTPATIENT)
Dept: CARDIAC REHAB | Facility: CLINIC | Age: 69
End: 2024-03-22
Payer: MEDICARE

## 2024-03-22 DIAGNOSIS — I21.3 ST ELEVATION MYOCARDIAL INFARCTION (STEMI), UNSPECIFIED ARTERY (HCC): Primary | ICD-10-CM

## 2024-03-22 PROCEDURE — 93798 PHYS/QHP OP CAR RHAB W/ECG: CPT

## 2024-03-25 ENCOUNTER — CLINICAL SUPPORT (OUTPATIENT)
Dept: CARDIAC REHAB | Facility: CLINIC | Age: 69
End: 2024-03-25
Payer: MEDICARE

## 2024-03-25 DIAGNOSIS — I21.3 ST ELEVATION MYOCARDIAL INFARCTION (STEMI), UNSPECIFIED ARTERY (HCC): Primary | ICD-10-CM

## 2024-03-25 PROCEDURE — 93798 PHYS/QHP OP CAR RHAB W/ECG: CPT

## 2024-03-27 ENCOUNTER — CLINICAL SUPPORT (OUTPATIENT)
Dept: CARDIAC REHAB | Facility: CLINIC | Age: 69
End: 2024-03-27
Payer: MEDICARE

## 2024-03-27 DIAGNOSIS — I21.3 ST ELEVATION MYOCARDIAL INFARCTION (STEMI), UNSPECIFIED ARTERY (HCC): Primary | ICD-10-CM

## 2024-03-27 PROCEDURE — 93798 PHYS/QHP OP CAR RHAB W/ECG: CPT

## 2024-03-27 NOTE — PROGRESS NOTES
EXERCISE SESSION DETAIL   Benzoyl Peroxide Pregnancy And Lactation Text: This medication is Pregnancy Category C. It is unknown if benzoyl peroxide is excreted in breast milk.

## 2024-03-28 ENCOUNTER — APPOINTMENT (OUTPATIENT)
Dept: LAB | Facility: CLINIC | Age: 69
End: 2024-03-28
Payer: MEDICARE

## 2024-03-28 DIAGNOSIS — E78.2 MIXED HYPERLIPIDEMIA: ICD-10-CM

## 2024-03-28 LAB
CHOLEST SERPL-MCNC: 107 MG/DL
HDLC SERPL-MCNC: 32 MG/DL
LDLC SERPL CALC-MCNC: 36 MG/DL (ref 0–100)
NONHDLC SERPL-MCNC: 75 MG/DL
TRIGL SERPL-MCNC: 197 MG/DL

## 2024-03-28 PROCEDURE — 80061 LIPID PANEL: CPT

## 2024-03-28 PROCEDURE — 36415 COLL VENOUS BLD VENIPUNCTURE: CPT

## 2024-03-29 ENCOUNTER — TELEPHONE (OUTPATIENT)
Dept: CARDIOLOGY CLINIC | Facility: CLINIC | Age: 69
End: 2024-03-29

## 2024-03-29 ENCOUNTER — CLINICAL SUPPORT (OUTPATIENT)
Dept: CARDIAC REHAB | Facility: CLINIC | Age: 69
End: 2024-03-29
Payer: MEDICARE

## 2024-03-29 DIAGNOSIS — I21.3 ST ELEVATION MYOCARDIAL INFARCTION (STEMI), UNSPECIFIED ARTERY (HCC): Primary | ICD-10-CM

## 2024-03-29 PROCEDURE — 93798 PHYS/QHP OP CAR RHAB W/ECG: CPT

## 2024-03-29 NOTE — TELEPHONE ENCOUNTER
----- Message from Johanna Leigh PA-C sent at 3/29/2024  1:19 PM EDT -----  Cholesterol levels looking better than before. Can discuss more at the upcoming appt with Dr. Baum.

## 2024-03-29 NOTE — PROGRESS NOTES
Cardiac Rehabilitation Plan of Care   30 DAY        Today's date: 2024   # of Exercise Sessions Completed: 12  Patient name: Jerome Coffey      : 1955  Age: 69 y.o.       MRN: [id number]  Referring Physician: Luz Maria Coleman*  Cardiologist: Reji Baum MD    Provider: [facility]  Clinician: Briana Peterson MS, CEP    Dx:   Encounter Diagnosis   Name Primary?    ST elevation myocardial infarction (STEMI), unspecified artery (HCC) Yes         Description of Diagnosis:  BEAN to Mid RCA lesion which was 100% stenosed    Date of onset: 24    SUMMARY OF PROGRESS: Jerome is doing well in cardiac rehab, attending 3 days/week. He tolerates 40-45 min of exercise at 3.9-4.8 METs without cardiac complaint. He added weight training today and will continue to progress with his elliptical time specifically to reach at least 20 min without complaint. He will stop using the treadmill due to back pain radiating into his hip. Jerome plans to return to exercise at his local gym 2-3 days/week starting this week or next. He has been lightly active at home but admits he could be doing more. He mostly likes to be active outside in warmer weather with yard work. Resting HR 58-68 bpm with normal HR response to exercise reaching  bpm. NSR noted on telemetry. Normal resting /80 - 112/80 with normal BP response to exercise reaching 138/80 - 142/84. Jerome has maintained his weight at 246 lb despite dietary change including reduced red meat intake, reduced refine sugar intake, reduced sodium intake, increased fish intake, and increased fruits and vegetable intake. His ultimate goal is to lose 25 lbs. We will continue to monitor his weight changes and expect his weight to drop with increased frequency of exercise. Jerome denies depression or anxiety and reports minimal stress. He also has good emotional support from family.      Medication compliance: Yes   Comments: Pt reports to be compliant with  medications    Fall Risk: Low   Comments: Ambulates with a steady gait with no assist device and Denies a fall in the past 6 months      EXERCISE ASSESSMENT and PLAN    ECG Interpretation: NSR    SMART Goals:   10% improvement in functional capacity based on max METs achieved in initial fitness assessment  improved DASI score by 10%  increased exercise capacity by 40% based on peak METs tolerated in cardiac rehab exercise session  maintain > 150 minutes per week of moderate intensity exercise    Patient Specific EXERCISE GOALS:   (home exercise, ADLs, recreation):   return to gym with increased confidence - focusing on aerobic exercise, minimizing muscular hypertrophy    Patient's progress toward SMART and personal goals: consistent with cardiac rehab 3 days/week, increased duration and intensity of exercise    Plan For next 30 days: education on home exercise guidelines, home exercise 30+ mins 2 days opposite CR, Group class: Risk Factors for Heart Disease, and increased METs in cardiac rehab    Current Aerobic Exercise Prescription:      Frequency: 3 days/week       Minutes: 40-45         METS: 3.9-4.8            HR:    RPE: 4-6         Modalities: Treadmill, UBE, Lifecycle, and Elliptical     Exercise workloads will be progressed gradually as tolerated, within limits of patient's ability, and according to the patient's response to the exercise program.      30 Day Goals for Aerobic Exercise Progression:    Frequency: 3 days/week of cardiac rehab       Supplement with home exercise 2+ days/wk as tolerated    Minutes: 40 -45                           >150 mins/wk of moderate intensity exercise   METS: 4.5-6.0   HR: 106-125 bpm (50-70% HRR)     RPE: 4-6   Modalities: Treadmill, UBE, Lifecycle, Elliptical, and Rower    Strength trainin-3 days / week  12-15 repetitions  1-2 sets per modality    Modalities: Leg Press, Chest Press, Pull Downs, Arm Extension, Arm Curl, Front Raises, Shoulder Shrugs, Calf  "Raises, and Continue PT shoulder exercises at home    Home Exercise: none - plans to start at the gym this week or next    Group and Individual Education: benefit of exercise for CAD risk factors, home exercise guidelines, AHA guidelines to achieve >150 mins/wk of moderate exercise, and RPE scale       Readiness to change: Action:  (Changing behavior)      NUTRITION ASSESSMENT AND PLAN    Weight control:    Starting weight: 246.4 lb   Current weight:   246.6 lb    Waist circumference:    Startin in   Current:      Diabetes: N/A  A1c: 5.8%    last measured: 24    Lipid management: Last lipid profile 3/28/24  Chol 107    HDL 32  LDL 36    SMART Goals:   TRG <150, reduced waist circumference <40 inches (M), and Improved Rate Your Plate score  >64    Patient Specific NUTRITION GOALS:  (wt control, diabetes management, dietary modifications): weight loss 220 lbs    Patient's progress toward SMART and personal goals: reduced triglycerides from 363 to 197, increased fish intake to 2 days/week, reduced red meant intake, reduced refined sugar intake, reduced portions, minimal alcohol intake; received education on healthy eating and reading food labels    Plan for next 30 days: increase daily intake of fruits and vegetables, eat more meatless meals, eat reduced-fat or part-skim cheese or rarely eat, use \"light\" tub margarine, choose low sugar desserts and sweets, and drink no more than 1-2 alcoholic drinks in a day    Group and Individual Education: heart healthy eating principles  low sodium diet  nutrition for  lipid management  group class: Heart Healthy Eating  group class:  Label Reading    Readiness to change: Action:  (Changing behavior)      PSYCHOSOCIAL ASSESSMENT AND PLAN    Emotional:  Depression assessment:  PHQ-9 = 1  1-4 = Minimal Depression            Anxiety measure:  KATERINA-7 = 1  0-4  = Not anxious    Assessment of depression and anxiety    Patient reports feelings of depression /anxiety since " his MI  Reports sufficient emotional support from family    Self-reported stress level:  3   Stress Management: Practice Relaxation Techniques and Exercise    Patient's rating of Social support: Very Good   Social Support Network: spouse, children, and grandchildren    Psychosocial Assessment as it relates to rehabilitation:   Patient denies issues with his/her family or home life that may affect their rehabilitation efforts.     SMART Goals:      Increased interest and pleasure in doing things and reduced time feeling nervous or on edge    Patient Specific PSYCHOCOSOCIAL GOALS:  (stress, emotional well being, social support):   reduced anxiety about health    Patient's progress toward SMART and personal goals: utilizing good support from family, pt denies feelings of depression or anxiety, received education on stress management    Plan For next 30 days: Practice relaxation techniques, Exercise, Keep a positive mindset, and Enjoy family    Group and Individual Education: benefits of a positive support system, depression and CAD, and class:  Stress and Your Health     Readiness to change: Action:  (Changing behavior)      OTHER CORE COMPONENTS     Tobacco:   Social History     Tobacco Use   Smoking Status Never   Smokeless Tobacco Never       Tobacco Use Intervention:   N/A:  Patient is a non-smoker     Anginal Symptoms:   Arm pain   NTG use: Compliant with carrying NTG, Understands proper use, and Pt has not used NTG since event     Blood pressure:    Restin/80 - 112/80   Exercise: 138/80 - 142/84    SMART Goals: consistent, controlled resting BP < 130/80      Patient Specific CORE COMPONENT GOALS (smoking, BP control, angina control, medication): reduced dietary sodium <2000mg and medication compliance    Patient's progress toward SMART and personal goals: reduced dietary sodium, normal resting and exercise BP    Plan For next 30 days: group class: Understanding Heart Disease, group class: Common Heart  Medications, medication compliance, avoid processed foods, engage in regular exercise, and monitor home BP    Group and Individual Education:  understanding high blood pressure and it's relationship to CAD    Readiness to change: Action:  (Changing behavior)

## 2024-04-01 ENCOUNTER — CLINICAL SUPPORT (OUTPATIENT)
Dept: CARDIAC REHAB | Facility: CLINIC | Age: 69
End: 2024-04-01
Payer: MEDICARE

## 2024-04-01 DIAGNOSIS — I21.3 ST ELEVATION MYOCARDIAL INFARCTION (STEMI), UNSPECIFIED ARTERY (HCC): Primary | ICD-10-CM

## 2024-04-01 PROCEDURE — 93798 PHYS/QHP OP CAR RHAB W/ECG: CPT

## 2024-04-02 ENCOUNTER — OFFICE VISIT (OUTPATIENT)
Dept: CARDIOLOGY CLINIC | Facility: CLINIC | Age: 69
End: 2024-04-02
Payer: MEDICARE

## 2024-04-02 VITALS
WEIGHT: 243 LBS | SYSTOLIC BLOOD PRESSURE: 132 MMHG | OXYGEN SATURATION: 95 % | BODY MASS INDEX: 32.06 KG/M2 | HEART RATE: 58 BPM | DIASTOLIC BLOOD PRESSURE: 78 MMHG

## 2024-04-02 DIAGNOSIS — I25.10 CORONARY ARTERY DISEASE INVOLVING NATIVE CORONARY ARTERY OF NATIVE HEART WITHOUT ANGINA PECTORIS: Primary | ICD-10-CM

## 2024-04-02 DIAGNOSIS — I27.21 PAH (PULMONARY ARTERY HYPERTENSION) (HCC): ICD-10-CM

## 2024-04-02 DIAGNOSIS — I77.810 AORTIC ECTASIA, THORACIC (HCC): ICD-10-CM

## 2024-04-02 DIAGNOSIS — I21.3 STEMI (ST ELEVATION MYOCARDIAL INFARCTION) (HCC): ICD-10-CM

## 2024-04-02 DIAGNOSIS — Z95.5 STATUS POST INSERTION OF DRUG ELUTING CORONARY ARTERY STENT: ICD-10-CM

## 2024-04-02 DIAGNOSIS — I25.10 ATHEROSCLEROSIS OF NATIVE CORONARY ARTERY OF NATIVE HEART WITHOUT ANGINA PECTORIS: ICD-10-CM

## 2024-04-02 DIAGNOSIS — E78.2 MIXED HYPERLIPIDEMIA: ICD-10-CM

## 2024-04-02 DIAGNOSIS — Z86.79 HISTORY OF ASCVD: ICD-10-CM

## 2024-04-02 DIAGNOSIS — I21.3 ST ELEVATION MYOCARDIAL INFARCTION (STEMI), UNSPECIFIED ARTERY (HCC): ICD-10-CM

## 2024-04-02 PROCEDURE — G2211 COMPLEX E/M VISIT ADD ON: HCPCS | Performed by: INTERNAL MEDICINE

## 2024-04-02 PROCEDURE — 99215 OFFICE O/P EST HI 40 MIN: CPT | Performed by: INTERNAL MEDICINE

## 2024-04-02 RX ORDER — ALLOPURINOL 100 MG/1
100 TABLET ORAL DAILY
COMMUNITY

## 2024-04-02 RX ORDER — ATORVASTATIN CALCIUM 80 MG/1
80 TABLET, FILM COATED ORAL
Qty: 90 TABLET | Refills: 3 | Status: SHIPPED | OUTPATIENT
Start: 2024-04-02

## 2024-04-02 RX ORDER — LOSARTAN POTASSIUM 25 MG/1
25 TABLET ORAL DAILY
Qty: 90 TABLET | Refills: 3 | Status: SHIPPED | OUTPATIENT
Start: 2024-04-02

## 2024-04-02 NOTE — ASSESSMENT & PLAN NOTE
He was noted to have moderate pulmonary hypertension on last echocardiogram when he was admitted with MI.  He gives no history that suggest obstructive sleep apnea.  His exercise tolerance is good.  On examination there is no objective evidence of heart failure.  -- Encouraging continue current medications and normal activities and to keep an eye on blood pressure control and weight.  -- We will do a limited echocardiogram in 6 months time prior to next visit to reassess pulmonary artery pressures.  -- Dietary and medical compliance are reinforced.  -- He is advised  to report any concerning symptoms such as chest pain, shortness of breath, decline in exercise tolerance or presyncope/syncope.

## 2024-04-02 NOTE — LETTER
April 2, 2024     Jerome BINTA DO Feliciano  [address] [facility]  [address] [place] [id number]    Patient: Jerome Coffey   YOB: 1955   Date of Visit: 4/2/2024       Dear Dr. Wiggins:    Thank you for referring Jerome Coffey to me for evaluation. Below are my notes for this consultation.    If you have questions, please do not hesitate to call me. I look forward to following your patient along with you.         Sincerely,        Reji Baum MD        CC: No Recipients    Reji Baum MD  4/2/2024  9:56 AM  [facility]   [facility]  [facility]  Primary Office: [address] [place] [address]  Phone: [phone]; Fax: [phone]  *-*-*-*-*-*-*-*-*-*-*-*-*-*-*-*-*-*-*-*-*-*-*-*-*-*-*-*-*-*-*-*-*-*-*-*-*-*-*-*-*-*-*-*-*-*-*-*-*-*-*-*-*-*  ENCOUNTER DATE: 04/02/24 9:55 AM  PATIENT NAME: Jerome Coffey   1955    [id number]  AGE:69 y.o.      SEX: male  ENCOUNTER PROVIDER: Reji Baum MD, [facility], [facility]  PRIMARY CARE PHYSICIAN: No primary care provider on file.    DIAGNOSES:  1. Coronary artery disease -- type I MI, Inferior ST elevation type, status post PCI of right coronary artery 2/19/2023  2. Dyslipidemia  3. Moderate pulmonary hypertension  4. Mild ectasia of ascending aorta up  5. Degenerative joint disease,  Multilevel lumbar spondylosis with spondylolisthesis and associated spondylolysis at L5-S1.   6. History of gout  7. Ectasia of ascending aorta.    ECHOCARDIOGRAM 2/19/2024:  Normal left ventricle size, moderate concentric left ventricle hypertrophy, normal left ventricular systolic function, EF 65%, mild hypokinesis of the base of inferior wall.  Normal diastolic function.  Mildly dilated right ventricle with normal right ventricular systolic function.  Normal left atrial size, mildly dilated right atrium.  Normal aortic valve, no aortic valve stenosis or regurgitation.  Normal mitral valve, mild mitral valve regurgitation.  Mild tricuspid valve  regurgitation.  No pulmonic valve regurgitation.  Suspected moderate pulmonary hypertension.  Estimated RVSP/history 54 mmHg.  No pericardial effusion.  Dilated aortic root and ascending aorta measuring up to 3.9 cm.    CARDIAC CATHETERIZATION 2/20/2024  Left main: Large vessel, minimal luminal irregularities.  LAD: Large vessel. No significant disease reported.  LCx: Large, nondominant.  Mild diffuse disease 4%.  RCA: 100% mid RCA stenosis with MEHREEN 0 flow with thrombotic lesion.  Successful balloon angioplasty and stent placement with 0% residual stenosis.       CURRENT ECG   No results found for this visit on 04/02/24.        CARDIOLOGY ASSESSMENT & PLAN      Diagnosis ICD-10-CM Associated Orders   1. Coronary artery disease involving native coronary artery of native heart without angina pectoris  I25.10       2. ST elevation myocardial infarction (STEMI), unspecified artery ([facility])  I21.3       3. PAH (pulmonary artery hypertension) ([facility])  I27.21       4. Mixed hyperlipidemia  E78.2       5. Status post insertion of drug eluting coronary artery stent  Z95.5       6. Aortic ectasia, thoracic ([facility])  I77.810       7. History of ASCVD  Z86.79 High sensitivity CRP      8. Atherosclerosis of native coronary artery of native heart without angina pectoris  I25.10 High sensitivity CRP      9. H/o STEMI S/P BEAN  I21.3 metoprolol tartrate (LOPRESSOR) 25 mg tablet     losartan (COZAAR) 25 mg tablet     ticagrelor (BRILINTA) 90 MG     atorvastatin (LIPITOR) 80 mg tablet         Coronary artery disease involving native coronary artery of native heart without angina pectoris  Mr. Jerome Coffey is overall doing well from no recent recurrence of angina like symptoms or symptoms of heart failure.  His exercise tolerance has improved and he is going to cardiac rehab.  Blood pressure is reasonable.  He is on good medical regimen.  -- He is advised to continue current medical therapy.  -- Encouraging him to complete cardiac rehab and  to exercise himself after that.  -- We discussed about addition of colchicine for anti-inflammatory effect which he mention.  I advised that it can be considered if he has any inflammation.  I am adding a high-sensitivity CRP level to be checked with his next routine blood work.      Aortic ectasia, thoracic (HCC)  -- Ectasia of ascending aorta but considering his tall height it is reasonable.  No indication to do a CT scan of the chest at this time.  But will consider this if blood pressure remains elevated or if there is interval increase based on following echocardiogram in 1 year time.    Status post insertion of drug eluting coronary artery stent  He is advised to continue uninterrupted dual antiplatelet therapy for 1 year time following which Brilinta will be discontinued and aspirin continued.  He is advised to continue high intensity statin therapy.    Hyperlipidemia  We will continue his current statin therapy along with omega-3 fatty acids.  He should have a follow-up lipids checked in 3 to 6 months.  Goal is to bring LDL 50% from baseline or less than 70 mg/dL.  -- Encouraging to continue physical activity and exercise.    PAH (pulmonary artery hypertension) (HCC)  He was noted to have moderate pulmonary hypertension on last echocardiogram when he was admitted with MI.  He gives no history that suggest obstructive sleep apnea.  His exercise tolerance is good.  On examination there is no objective evidence of heart failure.  -- Encouraging continue current medications and normal activities and to keep an eye on blood pressure control and weight.  -- We will do a limited echocardiogram in 6 months time prior to next visit to reassess pulmonary artery pressures.  -- Dietary and medical compliance are reinforced.  -- He is advised  to report any concerning symptoms such as chest pain, shortness of breath, decline in exercise tolerance or presyncope/syncope.     INTERVAL HISTORY, HISTORY OF PRESENT ILLNESS &  COMPREHENSIVE VISIT INFORMATION     Jerome Coffey is here for follow-up regarding his cardiac comorbidities which include: Coronary artery disease, history of MI February 2024, hypertension, dyslipidemia, ectasia of ascending aorta and other comorbidities.    He is here for follow-up visit accompanied with his wife.  Since last hospitalization he has had no new diagnosis or hospitalizations or significant illnesses.  He reports that he has been overall feeling well.  He has had no recurrence of unusual chest pain or shortness of breath or dizziness or lightheadedness or palpitations or passing out or near passing out.  He does occasionally have right and left frontal headaches.  He mentions that he was experiencing this more often before his cardiac intervention and after the intervention his symptoms resolved before coming back again until about last 3 days back when they have dissipated again.  Denies accompanying symptoms.  Has been going to cardiac rehab and I have reviewed the most recent report.  He is progressing well and his blood pressures have been all right.  He has had no significant ectopy with exercises.    Functional capacity status: Good   (Excellent- >10 METs; Good: (7-10 METs); Moderate (4-7 METs); Poor (<= 4 METs)    Any chronic stressors: None   (feeling of poor health, financial problems, and social isolation etc).    Tobacco or alcohol dependence: He has never been a smoker.  Reports drinking alcohol very rarely.    Current cardiac medications: Atorvastatin 80 mg daily losartan 25 mg daily metoprolol succinate 12.5 mg twice daily aspirin 81 mg daily ticagrelor 90 mg twice daily omega-3 fatty acids    Last recent comprehensive blood work available: GFR 88 magnesium 2.1  Blood work 2/20/2024 sodium 137 potassium 4.2 chloride 108 bicarb 24 BUN 14 creatinine 0.89  Lipid profile 3/28/2024 total cholesterol 107  triglyceride 197 HDL 32 calculated LDL 36.  Hemoglobin 13.9 hematocrit 40.6 platelet  count 180  TSH 1.59  Hemoglobin A1c 5.8    REVIEW OF SYSTEMS   Positive for: As noted above in HPI  Negative for: All remaining as reviewed below and in HPI.    SYSTEM SYMPTOMS REVIEWED:  General--weight change, fever, night sweats  Respiratory--cough, wheezing, shortness of breath, sputum production  Cardiovascular--chest pain, syncope, dyspnea on exertion, edema, decline in exercise tolerance, claudication   Gastrointestinal--persistent vomiting, diarrhea, abdominal distention, blood in stool   Muscular or skeletal--joint pain or swelling   Neurologic--headaches, syncope, abnormal movement  Hematologic--history of easy bruising and bleeding   Endocrine--thyroid enlargement, heat or cold intolerance, polyuria   Psychiatric--anxiety, depression     *-*-*-*-*-*-*-*-*-*-*-*-*-*-*-*-*-*-*-*-*-*-*-*-*-*-*-*-*-*-*-*-*-*-*-*-*-*-*-*-*-*-*-*-*-*-*-*-*-*-*-*-*-*-  VITAL SIGNS     CURRENT VITAL SIGNS:   Vitals:    04/02/24 0923   BP: 132/78   BP Location: Left arm   Patient Position: Sitting   Cuff Size: Large   Pulse: 58   SpO2: 95%   Weight: 110 kg (243 lb)       BMI: Body mass index is 32.06 kg/m².    WEIGHTS:   Wt Readings from Last 25 Encounters:   04/02/24 110 kg (243 lb)   02/26/24 111 kg (244 lb)   02/20/24 117 kg (258 lb 9.6 oz)        *-*-*-*-*-*-*-*-*-*-*-*-*-*-*-*-*-*-*-*-*-*-*-*-*-*-*-*-*-*-*-*-*-*-*-*-*-*-*-*-*-*-*-*-*-*-*-*-*-*-*-*-*-*-  PHYSICAL EXAM     General Appearance:    Alert, cooperative, no distress, appears stated age, tall well-built, increased BMI   Head, Eyes, ENT:    No obvious abnormality, moist mucous mebranes.   Neck:   Supple, no carotid bruit. No JVD, lo obvious lymphadenoapthy   Back:     Symmetric, no curvature.   Lungs:     Respirations unlabored. Clear to auscultation bilaterally,    Chest wall:    No tenderness or deformity   Heart:    Regular rate and rhythm, S1 and S2 normal, no murmur, rub  or gallop.   Abdomen:     Soft, non-tender,    Extremities:   Extremities warm, no cyanosis  or edema    Skin:   No venostatic changes in lower extremities. Normal skin color, texture, and turgor. No rashes or lesions   Neuro: Pt is alert and oriented time 3 with no gross motor deficits.   Psychiatric/Behavioral: Mood is normal. Behavior is normal.     *-*-*-*-*-*-*-*-*-*-*-*-*-*-*-*-*-*-*-*-*-*-*-*-*-*-*-*-*-*-*-*-*-*-*-*-*-*-*-*-*-*-*-*-*-*-*-*-*-*-*-*-*-*-  CURRENT MEDICATIONS LIST     Current Outpatient Medications:     allopurinol (ZYLOPRIM) 100 mg tablet, Take 100 mg by mouth daily, Disp: , Rfl:     aspirin 81 mg chewable tablet, Chew 1 tablet (81 mg total) daily, Disp: 60 tablet, Rfl: 0    atorvastatin (LIPITOR) 80 mg tablet, Take 1 tablet (80 mg total) by mouth daily with dinner, Disp: 90 tablet, Rfl: 3    Boswellia-Glucosamine-Vit D (Osteo Bi-Flex One Per Day) TABS, Take by mouth, Disp: , Rfl:     colchicine (COLCRYS) 0.6 mg tablet, One tid prn gout, Disp: 21 tablet, Rfl: 0    losartan (COZAAR) 25 mg tablet, Take 1 tablet (25 mg total) by mouth daily, Disp: 90 tablet, Rfl: 3    metoprolol tartrate (LOPRESSOR) 25 mg tablet, Take 0.5 tablets (12.5 mg total) by mouth every 12 (twelve) hours, Disp: 90 tablet, Rfl: 3    Multiple Vitamins-Minerals (MENS MULTIVITAMIN PO), , Disp: , Rfl:     Omega-3 Fatty Acids (Fish Oil Omega-3) 1000 MG CAPS, Take 2 g by mouth daily, Disp: , Rfl:     ticagrelor (BRILINTA) 90 MG, Take 1 tablet (90 mg total) by mouth every 12 (twelve) hours, Disp: 60 tablet, Rfl: 11    colchicine (COLCRYS) 0.6 mg tablet, , Disp: , Rfl:     Ergocalciferol (VITAMIN D2 PO), Take by mouth (Patient not taking: Reported on 4/2/2024), Disp: , Rfl:     Glucosamine 750 MG TABS, Take 2 tablets by mouth daily (Patient not taking: Reported on 4/2/2024), Disp: , Rfl:     nitroglycerin (NITROSTAT) 0.4 mg SL tablet, Place 1 tablet (0.4 mg total) under the tongue every 5 (five) minutes as needed for chest pain, Disp: 100 tablet, Rfl: 0       ALLERGIES     Allergies   Allergen Reactions    Clindamycin GI  "Intolerance       *-*-*-*-*-*-*-*-*-*-*-*-*-*-*-*-*-*-*-*-*-*-*-*-*-*-*-*-*-*-*-*-*-*-*-*-*-*-*-*-*-*-*-*-*-*-*-*-*-*-*-*-*-*-  LABORATORY DATA   No results found for: \"NA\"  Potassium   Date Value Ref Range Status   02/20/2024 4.2 3.5 - 5.3 mmol/L Final     Comment:     Slightly Hemolyzed:Results may be affected.   02/19/2024 4.1 3.5 - 5.3 mmol/L Final     Comment:     Slightly Hemolyzed:Results may be affected.   12/21/2023 4.2 3.5 - 5.2 mmol/L Final   07/13/2023 4.3 3.5 - 5.2 mmol/L Final   12/21/2022 4.1 3.5 - 5.2 mmol/L Final     Chloride   Date Value Ref Range Status   02/20/2024 108 96 - 108 mmol/L Final   02/19/2024 104 96 - 108 mmol/L Final   12/21/2023 103 100 - 109 mmol/L Final   07/13/2023 105 100 - 109 mmol/L Final   12/21/2022 105 100 - 109 mmol/L Final     Carbon Dioxide   Date Value Ref Range Status   12/21/2023 31 21 - 31 mmol/L Final   07/13/2023 27 21 - 31 mmol/L Final   12/21/2022 29 23 - 31 mmol/L Final     CO2   Date Value Ref Range Status   02/20/2024 24 21 - 32 mmol/L Final   02/19/2024 31 21 - 32 mmol/L Final     BUN   Date Value Ref Range Status   02/20/2024 14 5 - 25 mg/dL Final   02/19/2024 17 5 - 25 mg/dL Final   12/21/2023 16 7 - 28 mg/dL Final   07/13/2023 18 7 - 28 mg/dL Final   12/21/2022 19 7 - 28 mg/dL Final     Creatinine   Date Value Ref Range Status   02/20/2024 0.89 0.60 - 1.30 mg/dL Final     Comment:     Standardized to IDMS reference method   02/19/2024 1.16 0.60 - 1.30 mg/dL Final     Comment:     Standardized to IDMS reference method   12/21/2023 1.09 0.53 - 1.30 mg/dL Final   07/13/2023 1.09 0.53 - 1.30 mg/dL Final   12/21/2022 1.19 0.53 - 1.30 mg/dL Final     GFR, Calculated   Date Value Ref Range Status   12/18/2020 68 >60 mL/min/1.73m2 Final     Comment:     mL/min per 1.73 square meters                                            Normal Function or Mild Renal    Disease (if clinically at risk):  >or=60  Moderately Decreased:                30-59  Severely Decreased:     "              15-29  Renal Failure:                         <15                                            -American GFR: multiply reported GFR by 1.16    Please note that the eGFR is based on the CKD-EPI calculation, and is not intended to be used for drug dosing.                                            Note: Calculated GFR may not be an accurate indicator of renal function if the patient's renal function is not in a steady state.   09/21/2020 76 >60 mL/min/1.73m2 Final     Comment:     mL/min per 1.73 square meters                                            Normal Function or Mild Renal    Disease (if clinically at risk):  >or=60  Moderately Decreased:                30-59  Severely Decreased:                  15-29  Renal Failure:                         <15                                            -American GFR: multiply reported GFR by 1.16    Please note that the eGFR is based on the CKD-EPI calculation, and is not intended to be used for drug dosing.                                            Note: Calculated GFR may not be an accurate indicator of renal function if the patient's renal function is not in a steady state.   08/02/2019 56 (L) >60 mL/min/1.73m2 Final     Comment:     mL/min per 1.73 square meters                                            Normal Function or Mild Renal    Disease (if clinically at risk):  >or=60  Moderately Decreased:                30-59  Severely Decreased:                  15-29  Renal Failure:                         <15                                            -American GFR: multiply reported GFR by 1.16    Please note that the eGFR is based on the CKD-EPI calculation, and is not intended to be used for drug dosing.                                            Note: Calculated GFR may not be an accurate indicator of renal function if the patient's renal function is not in a steady state.     eGFRcr   Date Value Ref Range Status   12/21/2023 74 >59  "Final   07/13/2023 74 >59 Final   12/21/2022 67 >59 Final     eGFR   Date Value Ref Range Status   02/20/2024 88 ml/min/1.73sq m Final   02/19/2024 64 ml/min/1.73sq m Final     Calcium   Date Value Ref Range Status   02/20/2024 8.0 (L) 8.4 - 10.2 mg/dL Final   02/19/2024 8.9 8.4 - 10.2 mg/dL Final   12/21/2023 9.3 8.5 - 10.1 mg/dL Final   07/13/2023 9.3 8.5 - 10.1 mg/dL Final   12/21/2022 9.6 8.5 - 10.1 mg/dL Final     AST   Date Value Ref Range Status   02/19/2024 27 13 - 39 U/L Final     Comment:     Slightly Hemolyzed:Results may be affected.   12/21/2023 18 <41 U/L Final   07/13/2023 19 <41 U/L Final   12/21/2022 19 <41 U/L Final     ALT   Date Value Ref Range Status   02/19/2024 24 7 - 52 U/L Final     Comment:     Specimen collection should occur prior to Sulfasalazine administration due to the potential for falsely depressed results.    12/21/2023 25 <56 U/L Final   07/13/2023 22 <56 U/L Final   12/21/2022 47 <56 U/L Final     Alkaline Phosphatase   Date Value Ref Range Status   02/19/2024 55 34 - 104 U/L Final   12/21/2023 54 35 - 120 U/L Final   07/13/2023 59 35 - 120 U/L Final   12/21/2022 74 35 - 120 U/L Final     Magnesium   Date Value Ref Range Status   02/20/2024 2.1 1.9 - 2.7 mg/dL Final     WBC   Date Value Ref Range Status   02/20/2024 9.45 4.31 - 10.16 Thousand/uL Final   02/19/2024 9.62 4.31 - 10.16 Thousand/uL Final     Hemoglobin   Date Value Ref Range Status   02/20/2024 13.9 12.0 - 17.0 g/dL Final   02/19/2024 16.0 12.0 - 17.0 g/dL Final     Platelets   Date Value Ref Range Status   02/20/2024 180 149 - 390 Thousands/uL Final   02/19/2024 211 149 - 390 Thousands/uL Final     PTT   Date Value Ref Range Status   02/19/2024 26 23 - 37 seconds Final     Comment:     Therapeutic Heparin Range =  60-90 seconds     INR   Date Value Ref Range Status   02/19/2024 0.93 0.84 - 1.19 Final     No results found for: \"CK\", \"CKMB\", \"DIGOXIN\"  No results found for: \"TSH\"  No results found for: \"CHOL\" " "  Hemoglobin A1C   Date Value Ref Range Status   02/20/2024 5.8 (H) Normal 4.0-5.6%; PreDiabetic 5.7-6.4%; Diabetic >=6.5%; Glycemic control for adults with diabetes <7.0% % Final     No results found for: \"BLOODCX\", \"SPUTUMCULTUR\", \"GRAMSTAIN\", \"URINECX\", \"WOUNDCULT\", \"BODYFLUIDCUL\", \"MRSACULTURE\", \"INFLUAPCR\", \"INFLUBPCR\", \"RSVPCR\", \"LEGIONELLAUR\", \"CDIFFTOXINB\"    *-*-*-*-*-*-*-*-*-*-*-*-*-*-*-*-*-*-*-*-*-*-*-*-*-*-*-*-*-*-*-*-*-*-*-*-*-*-*-*-*-*-*-*-*-*-*-*-*-*-*-*-*-*-  PREVIOUS CARDIOLOGY & RADIOLOGY TEST RESULTS   I have personally reviewed pertinent results of cardiovascular tests noted below.    No results found for this or any previous visit.    No results found for this or any previous visit.    No results found for this or any previous visit.    No results found for this or any previous visit.    Cardiac catheterization    Mid RCA lesion is 100% stenosed.    The angioplasty was pre-stent angioplasty.    1v CAD        *-*-*-*-*-*-*-*-*-*-*-*-*-*-*-*-*-*-*-*-*-*-*-*-*-*-*-*-*-*-*-*-*-*-*-*-*-*-*-*-*-*-*-*-*-*-*-*-*-*-*-*-*-*-  SIGNATURES:   @SIG@   Reji Baum MD; [facility]    *-*-*-*-*-*-*-*-*-*-*-*-*-*-*-*-*-*-*-*-*-*-*-*-*-*-*-*-*-*-*-*-*-*-*-*-*-*-*-*-*-*-*-*-*-*-*-*-*-*-*-*-*-*-  PAST MEDICAL HISTORY:  Past Medical History:   Diagnosis Date    Erectile dysfunction     Knee pain, right     Renal calculi     PAST SURGICAL HISTORY:  Past Surgical History:   Procedure Laterality Date    CARDIAC CATHETERIZATION N/A 2/19/2024    Procedure: Cardiac pci;  Surgeon: Tl Dominique MD;  Location: [facility];  Service: Cardiology         FAMILY HISTORY:  No family history on file. SOCIAL HISTORY:  Social History     Tobacco Use   Smoking Status Never   Smokeless Tobacco Never      Social History     Substance and Sexual Activity   Alcohol Use Yes    Comment: social drinker     Social History     Substance and Sexual Activity   Drug Use Never    [unfilled] "     *-*-*-*-*-*-*-*-*-*-*-*-*-*-*-*-*-*-*-*-*-*-*-*-*-*-*-*-*-*-*-*-*-*-*-*-*-*-*-*-*-*-*-*-*-*-*-*-*-*-*-*-*-*  ALLERGIES:  Allergies   Allergen Reactions    Clindamycin GI Intolerance    CURRENT SCHEDULED MEDICATIONS:    Current Outpatient Medications:     allopurinol (ZYLOPRIM) 100 mg tablet, Take 100 mg by mouth daily, Disp: , Rfl:     aspirin 81 mg chewable tablet, Chew 1 tablet (81 mg total) daily, Disp: 60 tablet, Rfl: 0    atorvastatin (LIPITOR) 80 mg tablet, Take 1 tablet (80 mg total) by mouth daily with dinner, Disp: 90 tablet, Rfl: 3    Boswellia-Glucosamine-Vit D (Osteo Bi-Flex One Per Day) TABS, Take by mouth, Disp: , Rfl:     colchicine (COLCRYS) 0.6 mg tablet, One tid prn gout, Disp: 21 tablet, Rfl: 0    losartan (COZAAR) 25 mg tablet, Take 1 tablet (25 mg total) by mouth daily, Disp: 90 tablet, Rfl: 3    metoprolol tartrate (LOPRESSOR) 25 mg tablet, Take 0.5 tablets (12.5 mg total) by mouth every 12 (twelve) hours, Disp: 90 tablet, Rfl: 3    Multiple Vitamins-Minerals (MENS MULTIVITAMIN PO), , Disp: , Rfl:     Omega-3 Fatty Acids (Fish Oil Omega-3) 1000 MG CAPS, Take 2 g by mouth daily, Disp: , Rfl:     ticagrelor (BRILINTA) 90 MG, Take 1 tablet (90 mg total) by mouth every 12 (twelve) hours, Disp: 60 tablet, Rfl: 11    colchicine (COLCRYS) 0.6 mg tablet, , Disp: , Rfl:     Ergocalciferol (VITAMIN D2 PO), Take by mouth (Patient not taking: Reported on 4/2/2024), Disp: , Rfl:     Glucosamine 750 MG TABS, Take 2 tablets by mouth daily (Patient not taking: Reported on 4/2/2024), Disp: , Rfl:     nitroglycerin (NITROSTAT) 0.4 mg SL tablet, Place 1 tablet (0.4 mg total) under the tongue every 5 (five) minutes as needed for chest pain, Disp: 100 tablet, Rfl: 0     *-*-*-*-*-*-*-*-*-*-*-*-*-*-*-*-*-*-*-*-*-*-*-*-*-*-*-*-*-*-*-*-*-*-*-*-*-*-*-*-*-*-*-*-*-*-*-*-*-*-*-*-*-*        Ather MD Bautista  4/2/2024 12:08 AM  Sign when Signing Visit   [facility]  [facility]  Primary Office: [address] [place] [address]  Phone: [phone]; Fax: [phone]  *-*-*-*-*-*-*-*-*-*-*-*-*-*-*-*-*-*-*-*-*-*-*-*-*-*-*-*-*-*-*-*-*-*-*-*-*-*-*-*-*-*-*-*-*-*-*-*-*-*-*-*-*-*  ENCOUNTER DATE: 04/02/24 12:08 AM  PATIENT NAME: Jerome Coffey   1955    [id number]  AGE:69 y.o.      SEX: male  ENCOUNTER PROVIDER: Reji Baum MD, [facility], [facility]  PRIMARY CARE PHYSICIAN: No primary care provider on file.    DIAGNOSES:  1. Coronary artery disease -- type I MI, Inferior ST elevation type, status post PCI of right coronary artery 2/19/2023  2. Dyslipidemia  3. Moderate pulmonary hypertension  4. Mild ectasia of ascending aorta up  5. Degenerative joint disease,  Multilevel lumbar spondylosis with spondylolisthesis and associated spondylolysis at L5-S1.   6. History of gout  7. Ectasia of ascending aorta.    ECHOCARDIOGRAM 2/19/2024:  Normal left ventricle size, moderate concentric left ventricle hypertrophy, normal left ventricular systolic function, EF 65%, mild hypokinesis of the base of inferior wall.  Normal diastolic function.  Mildly dilated right ventricle with normal right ventricular systolic function.  Normal left atrial size, mildly dilated right atrium.  Normal aortic valve, no aortic valve stenosis or regurgitation.  Normal mitral valve, mild mitral valve regurgitation.  Mild tricuspid valve regurgitation.  No pulmonic valve regurgitation.  Suspected moderate pulmonary hypertension.  Estimated RVSP/history 54 mmHg.  No pericardial effusion.  Dilated aortic root and ascending aorta measuring up to 3.9 cm.    CARDIAC CATHETERIZATION 2/20/2024  Left main: Large vessel, minimal luminal irregularities.  LAD: Large vessel. No significant disease reported.  LCx: Large, nondominant.  Mild diffuse disease 4%.  RCA: 100% mid RCA stenosis with MEHREEN 0 flow with thrombotic lesion.  Successful balloon angioplasty and stent placement with 0% residual stenosis.       CURRENT ECG   No  results found for this visit on 04/02/24.        CARDIOLOGY ASSESSMENT & PLAN      Diagnosis ICD-10-CM Associated Orders   1. Coronary artery disease involving native coronary artery of native heart without angina pectoris  I25.10       2. ST elevation myocardial infarction (STEMI), unspecified artery ([facility])  I21.3       3. PAH (pulmonary artery hypertension) ([facility])  I27.21       4. Mixed hyperlipidemia  E78.2       5. Status post insertion of drug eluting coronary artery stent  Z95.5       6. Aortic ectasia, thoracic ([facility])  I77.810          No problem-specific Assessment & Plan notes found for this encounter.         INTERVAL HISTORY, HISTORY OF PRESENT ILLNESS & COMPREHENSIVE VISIT INFORMATION     Jerome Coffey is here for follow-up regarding his cardiac comorbidities which include: Coronary artery disease, history of MI February 2024, hypertension, dyslipidemia, ectasia of ascending aorta and other comorbidities.      Functional capacity status: ***   (Excellent- >10 METs; Good: (7-10 METs); Moderate (4-7 METs); Poor (<= 4 METs)    Any chronic stressors: ***   (feeling of poor health, financial problems, and social isolation etc).    Tobacco or alcohol dependence: ***    Current cardiac medications: Atorvastatin 80 mg daily losartan 25 mg daily metoprolol succinate 12.5 mg twice daily aspirin 81 mg daily ticagrelor 90 mg twice daily omega-3 fatty acids    Last recent comprehensive blood work available: GFR 88 magnesium 2.1  Blood work 2/20/2024 sodium 137 potassium 4.2 chloride 108 bicarb 24 BUN 14 creatinine 0.89  Total cholesterol 167 triglyceride 363 HDL 35 calculated LDL 59  Hemoglobin 13.9 hematocrit 40.6 platelet count 180  Total cholesterol 107 HDL 32 triglyceride 197 calculated LDL 36  TSH 1.59  Hemoglobin A1c 5.8    REVIEW OF SYSTEMS   Positive for: As noted above in HPI  Negative for: All remaining as reviewed below and in HPI.    SYSTEM SYMPTOMS REVIEWED:  General--weight change, fever, night  sweats  Respiratory--cough, wheezing, shortness of breath, sputum production  Cardiovascular--chest pain, syncope, dyspnea on exertion, edema, decline in exercise tolerance, claudication   Gastrointestinal--persistent vomiting, diarrhea, abdominal distention, blood in stool   Muscular or skeletal--joint pain or swelling   Neurologic--headaches, syncope, abnormal movement  Hematologic--history of easy bruising and bleeding   Endocrine--thyroid enlargement, heat or cold intolerance, polyuria   Psychiatric--anxiety, depression     *-*-*-*-*-*-*-*-*-*-*-*-*-*-*-*-*-*-*-*-*-*-*-*-*-*-*-*-*-*-*-*-*-*-*-*-*-*-*-*-*-*-*-*-*-*-*-*-*-*-*-*-*-*-  VITAL SIGNS     CURRENT VITAL SIGNS: There were no vitals filed for this visit.    BMI: There is no height or weight on file to calculate BMI.    WEIGHTS:   Wt Readings from Last 25 Encounters:   02/26/24 111 kg (244 lb)   02/20/24 117 kg (258 lb 9.6 oz)        *-*-*-*-*-*-*-*-*-*-*-*-*-*-*-*-*-*-*-*-*-*-*-*-*-*-*-*-*-*-*-*-*-*-*-*-*-*-*-*-*-*-*-*-*-*-*-*-*-*-*-*-*-*-  PHYSICAL EXAM     General Appearance:    Alert, cooperative, no distress, appears stated age***   Head, Eyes, ENT:    No obvious abnormality, moist mucous mebranes.   Neck:   Supple, no carotid bruit. No JVD, lo obvious lymphadenoapthy   Back:     Symmetric, no curvature.   Lungs:     Respirations unlabored. Clear to auscultation bilaterally,    Chest wall:    No tenderness or deformity   Heart:    Regular rate and rhythm, S1 and S2 normal, no murmur, rub  or gallop.***   Abdomen:     Soft, non-tender, No obvious masses, or organomegaly, no abdominal bruit   Extremities:   Extremities warm, no cyanosis or edema ***   Skin:   *** venostatic changes in lower extremities. Normal skin color, texture, and turgor. No rashes or lesions   Neuro: Pt is alert and oriented time 3 with no gross motor deficits.   Psychiatric/Behavioral: Mood is normal. Behavior is normal.  "    *-*-*-*-*-*-*-*-*-*-*-*-*-*-*-*-*-*-*-*-*-*-*-*-*-*-*-*-*-*-*-*-*-*-*-*-*-*-*-*-*-*-*-*-*-*-*-*-*-*-*-*-*-*-  CURRENT MEDICATIONS LIST     Current Outpatient Medications:     aspirin 81 mg chewable tablet, Chew 1 tablet (81 mg total) daily, Disp: 60 tablet, Rfl: 0    atorvastatin (LIPITOR) 80 mg tablet, Take 1 tablet (80 mg total) by mouth daily with dinner, Disp: 90 tablet, Rfl: 3    Boswellia-Glucosamine-Vit D (Osteo Bi-Flex One Per Day) TABS, Take by mouth, Disp: , Rfl:     colchicine (COLCRYS) 0.6 mg tablet, , Disp: , Rfl:     colchicine (COLCRYS) 0.6 mg tablet, One tid prn gout, Disp: 21 tablet, Rfl: 0    Ergocalciferol (VITAMIN D2 PO), Take by mouth, Disp: , Rfl:     Glucosamine 750 MG TABS, Take 2 tablets by mouth daily, Disp: , Rfl:     losartan (COZAAR) 25 mg tablet, Take 1 tablet (25 mg total) by mouth daily, Disp: 90 tablet, Rfl: 3    metoprolol tartrate (LOPRESSOR) 25 mg tablet, Take 0.5 tablets (12.5 mg total) by mouth every 12 (twelve) hours, Disp: 90 tablet, Rfl: 3    Multiple Vitamins-Minerals (MENS MULTIVITAMIN PO), , Disp: , Rfl:     nitroglycerin (NITROSTAT) 0.4 mg SL tablet, Place 1 tablet (0.4 mg total) under the tongue every 5 (five) minutes as needed for chest pain, Disp: 100 tablet, Rfl: 0    Omega-3 Fatty Acids (Fish Oil Omega-3) 1000 MG CAPS, Take 2 g by mouth daily, Disp: , Rfl:     ticagrelor (BRILINTA) 90 MG, Take 1 tablet (90 mg total) by mouth every 12 (twelve) hours, Disp: 60 tablet, Rfl: 11    ticagrelor (BRILINTA) 90 MG, Take 1 tablet (90 mg total) by mouth every 12 (twelve) hours (Patient not taking: Reported on 2/26/2024), Disp: 180 tablet, Rfl: 0       ALLERGIES     Allergies   Allergen Reactions    Clindamycin GI Intolerance       *-*-*-*-*-*-*-*-*-*-*-*-*-*-*-*-*-*-*-*-*-*-*-*-*-*-*-*-*-*-*-*-*-*-*-*-*-*-*-*-*-*-*-*-*-*-*-*-*-*-*-*-*-*-  LABORATORY DATA   No results found for: \"NA\"  Potassium   Date Value Ref Range Status   02/20/2024 4.2 3.5 - 5.3 mmol/L Final     Comment: "     Slightly Hemolyzed:Results may be affected.   02/19/2024 4.1 3.5 - 5.3 mmol/L Final     Comment:     Slightly Hemolyzed:Results may be affected.   12/21/2023 4.2 3.5 - 5.2 mmol/L Final   07/13/2023 4.3 3.5 - 5.2 mmol/L Final   12/21/2022 4.1 3.5 - 5.2 mmol/L Final     Chloride   Date Value Ref Range Status   02/20/2024 108 96 - 108 mmol/L Final   02/19/2024 104 96 - 108 mmol/L Final   12/21/2023 103 100 - 109 mmol/L Final   07/13/2023 105 100 - 109 mmol/L Final   12/21/2022 105 100 - 109 mmol/L Final     Carbon Dioxide   Date Value Ref Range Status   12/21/2023 31 21 - 31 mmol/L Final   07/13/2023 27 21 - 31 mmol/L Final   12/21/2022 29 23 - 31 mmol/L Final     CO2   Date Value Ref Range Status   02/20/2024 24 21 - 32 mmol/L Final   02/19/2024 31 21 - 32 mmol/L Final     BUN   Date Value Ref Range Status   02/20/2024 14 5 - 25 mg/dL Final   02/19/2024 17 5 - 25 mg/dL Final   12/21/2023 16 7 - 28 mg/dL Final   07/13/2023 18 7 - 28 mg/dL Final   12/21/2022 19 7 - 28 mg/dL Final     Creatinine   Date Value Ref Range Status   02/20/2024 0.89 0.60 - 1.30 mg/dL Final     Comment:     Standardized to IDMS reference method   02/19/2024 1.16 0.60 - 1.30 mg/dL Final     Comment:     Standardized to IDMS reference method   12/21/2023 1.09 0.53 - 1.30 mg/dL Final   07/13/2023 1.09 0.53 - 1.30 mg/dL Final   12/21/2022 1.19 0.53 - 1.30 mg/dL Final     GFR, Calculated   Date Value Ref Range Status   12/18/2020 68 >60 mL/min/1.73m2 Final     Comment:     mL/min per 1.73 square meters                                            Normal Function or Mild Renal    Disease (if clinically at risk):  >or=60  Moderately Decreased:                30-59  Severely Decreased:                  15-29  Renal Failure:                         <15                                            -American GFR: multiply reported GFR by 1.16    Please note that the eGFR is based on the CKD-EPI calculation, and is not intended to be used for drug  dosing.                                            Note: Calculated GFR may not be an accurate indicator of renal function if the patient's renal function is not in a steady state.   09/21/2020 76 >60 mL/min/1.73m2 Final     Comment:     mL/min per 1.73 square meters                                            Normal Function or Mild Renal    Disease (if clinically at risk):  >or=60  Moderately Decreased:                30-59  Severely Decreased:                  15-29  Renal Failure:                         <15                                            -American GFR: multiply reported GFR by 1.16    Please note that the eGFR is based on the CKD-EPI calculation, and is not intended to be used for drug dosing.                                            Note: Calculated GFR may not be an accurate indicator of renal function if the patient's renal function is not in a steady state.   08/02/2019 56 (L) >60 mL/min/1.73m2 Final     Comment:     mL/min per 1.73 square meters                                            Normal Function or Mild Renal    Disease (if clinically at risk):  >or=60  Moderately Decreased:                30-59  Severely Decreased:                  15-29  Renal Failure:                         <15                                            -American GFR: multiply reported GFR by 1.16    Please note that the eGFR is based on the CKD-EPI calculation, and is not intended to be used for drug dosing.                                            Note: Calculated GFR may not be an accurate indicator of renal function if the patient's renal function is not in a steady state.     eGFRcr   Date Value Ref Range Status   12/21/2023 74 >59 Final   07/13/2023 74 >59 Final   12/21/2022 67 >59 Final     eGFR   Date Value Ref Range Status   02/20/2024 88 ml/min/1.73sq m Final   02/19/2024 64 ml/min/1.73sq m Final     Calcium   Date Value Ref Range Status   02/20/2024 8.0 (L) 8.4 - 10.2 mg/dL Final  "  02/19/2024 8.9 8.4 - 10.2 mg/dL Final   12/21/2023 9.3 8.5 - 10.1 mg/dL Final   07/13/2023 9.3 8.5 - 10.1 mg/dL Final   12/21/2022 9.6 8.5 - 10.1 mg/dL Final     AST   Date Value Ref Range Status   02/19/2024 27 13 - 39 U/L Final     Comment:     Slightly Hemolyzed:Results may be affected.   12/21/2023 18 <41 U/L Final   07/13/2023 19 <41 U/L Final   12/21/2022 19 <41 U/L Final     ALT   Date Value Ref Range Status   02/19/2024 24 7 - 52 U/L Final     Comment:     Specimen collection should occur prior to Sulfasalazine administration due to the potential for falsely depressed results.    12/21/2023 25 <56 U/L Final   07/13/2023 22 <56 U/L Final   12/21/2022 47 <56 U/L Final     Alkaline Phosphatase   Date Value Ref Range Status   02/19/2024 55 34 - 104 U/L Final   12/21/2023 54 35 - 120 U/L Final   07/13/2023 59 35 - 120 U/L Final   12/21/2022 74 35 - 120 U/L Final     Magnesium   Date Value Ref Range Status   02/20/2024 2.1 1.9 - 2.7 mg/dL Final     WBC   Date Value Ref Range Status   02/20/2024 9.45 4.31 - 10.16 Thousand/uL Final   02/19/2024 9.62 4.31 - 10.16 Thousand/uL Final     Hemoglobin   Date Value Ref Range Status   02/20/2024 13.9 12.0 - 17.0 g/dL Final   02/19/2024 16.0 12.0 - 17.0 g/dL Final     Platelets   Date Value Ref Range Status   02/20/2024 180 149 - 390 Thousands/uL Final   02/19/2024 211 149 - 390 Thousands/uL Final     PTT   Date Value Ref Range Status   02/19/2024 26 23 - 37 seconds Final     Comment:     Therapeutic Heparin Range =  60-90 seconds     INR   Date Value Ref Range Status   02/19/2024 0.93 0.84 - 1.19 Final     No results found for: \"CK\", \"CKMB\", \"DIGOXIN\"  No results found for: \"TSH\"  No results found for: \"CHOL\"   Hemoglobin A1C   Date Value Ref Range Status   02/20/2024 5.8 (H) Normal 4.0-5.6%; PreDiabetic 5.7-6.4%; Diabetic >=6.5%; Glycemic control for adults with diabetes <7.0% % Final     No results found for: \"BLOODCX\", \"SPUTUMCULTUR\", \"GRAMSTAIN\", \"URINECX\", " "\"WOUNDCULT\", \"BODYFLUIDCUL\", \"MRSACULTURE\", \"INFLUAPCR\", \"INFLUBPCR\", \"RSVPCR\", \"LEGIONELLAUR\", \"CDIFFTOXINB\"    *-*-*-*-*-*-*-*-*-*-*-*-*-*-*-*-*-*-*-*-*-*-*-*-*-*-*-*-*-*-*-*-*-*-*-*-*-*-*-*-*-*-*-*-*-*-*-*-*-*-*-*-*-*-  PREVIOUS CARDIOLOGY & RADIOLOGY TEST RESULTS   I have personally reviewed pertinent results of cardiovascular tests noted below.    No results found for this or any previous visit.    No results found for this or any previous visit.    No results found for this or any previous visit.    No results found for this or any previous visit.    Cardiac catheterization    Mid RCA lesion is 100% stenosed.    The angioplasty was pre-stent angioplasty.    1v CAD        *-*-*-*-*-*-*-*-*-*-*-*-*-*-*-*-*-*-*-*-*-*-*-*-*-*-*-*-*-*-*-*-*-*-*-*-*-*-*-*-*-*-*-*-*-*-*-*-*-*-*-*-*-*-  SIGNATURES:   @SIG@   Reji Baum MD; [facility]    *-*-*-*-*-*-*-*-*-*-*-*-*-*-*-*-*-*-*-*-*-*-*-*-*-*-*-*-*-*-*-*-*-*-*-*-*-*-*-*-*-*-*-*-*-*-*-*-*-*-*-*-*-*-  PAST MEDICAL HISTORY:  Past Medical History:   Diagnosis Date    Erectile dysfunction     Knee pain, right     Renal calculi     PAST SURGICAL HISTORY:  Past Surgical History:   Procedure Laterality Date    CARDIAC CATHETERIZATION N/A 2/19/2024    Procedure: Cardiac pci;  Surgeon: Tl Dominique MD;  Location: [facility];  Service: Cardiology         FAMILY HISTORY:  No family history on file. SOCIAL HISTORY:  Social History     Tobacco Use   Smoking Status Never   Smokeless Tobacco Never      Social History     Substance and Sexual Activity   Alcohol Use Yes    Comment: social drinker     Social History     Substance and Sexual Activity   Drug Use Never    [unfilled]     *-*-*-*-*-*-*-*-*-*-*-*-*-*-*-*-*-*-*-*-*-*-*-*-*-*-*-*-*-*-*-*-*-*-*-*-*-*-*-*-*-*-*-*-*-*-*-*-*-*-*-*-*-*  ALLERGIES:  Allergies   Allergen Reactions    Clindamycin GI Intolerance    CURRENT SCHEDULED MEDICATIONS:    Current Outpatient Medications:     aspirin 81 mg chewable tablet, Chew 1 tablet (81 mg " total) daily, Disp: 60 tablet, Rfl: 0    atorvastatin (LIPITOR) 80 mg tablet, Take 1 tablet (80 mg total) by mouth daily with dinner, Disp: 90 tablet, Rfl: 3    Boswellia-Glucosamine-Vit D (Osteo Bi-Flex One Per Day) TABS, Take by mouth, Disp: , Rfl:     colchicine (COLCRYS) 0.6 mg tablet, , Disp: , Rfl:     colchicine (COLCRYS) 0.6 mg tablet, One tid prn gout, Disp: 21 tablet, Rfl: 0    Ergocalciferol (VITAMIN D2 PO), Take by mouth, Disp: , Rfl:     Glucosamine 750 MG TABS, Take 2 tablets by mouth daily, Disp: , Rfl:     losartan (COZAAR) 25 mg tablet, Take 1 tablet (25 mg total) by mouth daily, Disp: 90 tablet, Rfl: 3    metoprolol tartrate (LOPRESSOR) 25 mg tablet, Take 0.5 tablets (12.5 mg total) by mouth every 12 (twelve) hours, Disp: 90 tablet, Rfl: 3    Multiple Vitamins-Minerals (MENS MULTIVITAMIN PO), , Disp: , Rfl:     nitroglycerin (NITROSTAT) 0.4 mg SL tablet, Place 1 tablet (0.4 mg total) under the tongue every 5 (five) minutes as needed for chest pain, Disp: 100 tablet, Rfl: 0    Omega-3 Fatty Acids (Fish Oil Omega-3) 1000 MG CAPS, Take 2 g by mouth daily, Disp: , Rfl:     ticagrelor (BRILINTA) 90 MG, Take 1 tablet (90 mg total) by mouth every 12 (twelve) hours, Disp: 60 tablet, Rfl: 11    ticagrelor (BRILINTA) 90 MG, Take 1 tablet (90 mg total) by mouth every 12 (twelve) hours (Patient not taking: Reported on 2/26/2024), Disp: 180 tablet, Rfl: 0     *-*-*-*-*-*-*-*-*-*-*-*-*-*-*-*-*-*-*-*-*-*-*-*-*-*-*-*-*-*-*-*-*-*-*-*-*-*-*-*-*-*-*-*-*-*-*-*-*-*-*-*-*-*

## 2024-04-02 NOTE — PATIENT INSTRUCTIONS
CARDIOLOGY ASSESSMENT & PLAN      Diagnosis ICD-10-CM Associated Orders   1. Coronary artery disease involving native coronary artery of native heart without angina pectoris  I25.10       2. ST elevation myocardial infarction (STEMI), unspecified artery (HCC)  I21.3       3. PAH (pulmonary artery hypertension) ([facility])  I27.21       4. Mixed hyperlipidemia  E78.2       5. Status post insertion of drug eluting coronary artery stent  Z95.5       6. Aortic ectasia, thoracic ([facility])  I77.810       7. History of ASCVD  Z86.79 High sensitivity CRP      8. Atherosclerosis of native coronary artery of native heart without angina pectoris  I25.10 High sensitivity CRP      9. H/o STEMI S/P BEAN  I21.3 metoprolol tartrate (LOPRESSOR) 25 mg tablet     losartan (COZAAR) 25 mg tablet     ticagrelor (BRILINTA) 90 MG     atorvastatin (LIPITOR) 80 mg tablet         Coronary artery disease involving native coronary artery of native heart without angina pectoris  Mr. Jerome Coffey is overall doing well from no recent recurrence of angina like symptoms or symptoms of heart failure.  His exercise tolerance has improved and he is going to cardiac rehab.  Blood pressure is reasonable.  He is on good medical regimen.  -- He is advised to continue current medical therapy.  -- Encouraging him to complete cardiac rehab and to exercise himself after that.  -- We discussed about addition of colchicine for anti-inflammatory effect which he mention.  I advised that it can be considered if he has any inflammation.  I am adding a high-sensitivity CRP level to be checked with his next routine blood work.      Aortic ectasia, thoracic ([facility])  -- Ectasia of ascending aorta but considering his tall height it is reasonable.  No indication to do a CT scan of the chest at this time.  But will consider this if blood pressure remains elevated or if there is interval increase based on following echocardiogram in 1 year time.    Status post insertion of drug  eluting coronary artery stent  He is advised to continue uninterrupted dual antiplatelet therapy for 1 year time following which Brilinta will be discontinued and aspirin continued.  He is advised to continue high intensity statin therapy.    Hyperlipidemia  We will continue his current statin therapy along with omega-3 fatty acids.  He should have a follow-up lipids checked in 3 to 6 months.  Goal is to bring LDL 50% from baseline or less than 70 mg/dL.  -- Encouraging to continue physical activity and exercise.    PAH (pulmonary artery hypertension) (HCC)  He was noted to have moderate pulmonary hypertension on last echocardiogram when he was admitted with MI.  He gives no history that suggest obstructive sleep apnea.  His exercise tolerance is good.  On examination there is no objective evidence of heart failure.  -- Encouraging continue current medications and normal activities and to keep an eye on blood pressure control and weight.  -- We will do a limited echocardiogram in 6 months time prior to next visit to reassess pulmonary artery pressures.  -- Dietary and medical compliance are reinforced.  -- He is advised  to report any concerning symptoms such as chest pain, shortness of breath, decline in exercise tolerance or presyncope/syncope.

## 2024-04-02 NOTE — ASSESSMENT & PLAN NOTE
Mr. Jerome Coffey is overall doing well from no recent recurrence of angina like symptoms or symptoms of heart failure.  His exercise tolerance has improved and he is going to cardiac rehab.  Blood pressure is reasonable.  He is on good medical regimen.  -- He is advised to continue current medical therapy.  -- Encouraging him to complete cardiac rehab and to exercise himself after that.  -- We discussed about addition of colchicine for anti-inflammatory effect which he mention.  I advised that it can be considered if he has any inflammation.  I am adding a high-sensitivity CRP level to be checked with his next routine blood work.  -- Regarding use of sildenafil I am okay with him using it.  He is encouraged to not take any sublingual or every nitroglycerin pill within 4 hours around the use of this medication.  He is advised to take losartan in the morning and metoprolol at least an hour before he uses the Viagra medication.

## 2024-04-02 NOTE — ASSESSMENT & PLAN NOTE
He is advised to continue uninterrupted dual antiplatelet therapy for 1 year time following which Brilinta will be discontinued and aspirin continued.  He is advised to continue high intensity statin therapy.

## 2024-04-02 NOTE — ASSESSMENT & PLAN NOTE
We will continue his current statin therapy along with omega-3 fatty acids.  He should have a follow-up lipids checked in 3 to 6 months.  Goal is to bring LDL 50% from baseline or less than 70 mg/dL.  -- Encouraging to continue physical activity and exercise.

## 2024-04-02 NOTE — ASSESSMENT & PLAN NOTE
-- Ectasia of ascending aorta but considering his tall height it is reasonable.  No indication to do a CT scan of the chest at this time.  But will consider this if blood pressure remains elevated or if there is interval increase based on following echocardiogram in 1 year time.

## 2024-04-02 NOTE — PROGRESS NOTES
[facility]  [facility]  Primary Office: [address] [place] [id number]  Phone: [phone]; Fax: [phone]  *-*-*-*-*-*-*-*-*-*-*-*-*-*-*-*-*-*-*-*-*-*-*-*-*-*-*-*-*-*-*-*-*-*-*-*-*-*-*-*-*-*-*-*-*-*-*-*-*-*-*-*-*-*  ENCOUNTER DATE: 04/02/24 9:55 AM  PATIENT NAME: Jerome Coffey   1955    [id number]  AGE:69 y.o.      SEX: male  ENCOUNTER PROVIDER: Reji Baum MD, A, [facility]  PRIMARY CARE PHYSICIAN: No primary care provider on file.    DIAGNOSES:  1. Coronary artery disease -- type I MI, Inferior ST elevation type, status post PCI of right coronary artery 2/19/2023  2. Dyslipidemia  3. Moderate pulmonary hypertension  4. Mild ectasia of ascending aorta up  5. Degenerative joint disease,  Multilevel lumbar spondylosis with spondylolisthesis and associated spondylolysis at L5-S1.   6. History of gout  7. Ectasia of ascending aorta.    ECHOCARDIOGRAM 2/19/2024:  Normal left ventricle size, moderate concentric left ventricle hypertrophy, normal left ventricular systolic function, EF 65%, mild hypokinesis of the base of inferior wall.  Normal diastolic function.  Mildly dilated right ventricle with normal right ventricular systolic function.  Normal left atrial size, mildly dilated right atrium.  Normal aortic valve, no aortic valve stenosis or regurgitation.  Normal mitral valve, mild mitral valve regurgitation.  Mild tricuspid valve regurgitation.  No pulmonic valve regurgitation.  Suspected moderate pulmonary hypertension.  Estimated RVSP/history 54 mmHg.  No pericardial effusion.  Dilated aortic root and ascending aorta measuring up to 3.9 cm.    CARDIAC CATHETERIZATION 2/20/2024  Left main: Large vessel, minimal luminal irregularities.  LAD: Large vessel. No significant disease reported.  LCx: Large, nondominant.  Mild diffuse disease 4%.  RCA: 100% mid RCA stenosis with MEHREEN 0 flow with thrombotic lesion.  Successful balloon angioplasty and stent  placement with 0% residual stenosis.       CURRENT ECG   No results found for this visit on 04/02/24.        CARDIOLOGY ASSESSMENT & PLAN      Diagnosis ICD-10-CM Associated Orders   1. Coronary artery disease involving native coronary artery of native heart without angina pectoris  I25.10       2. ST elevation myocardial infarction (STEMI), unspecified artery ([facility])  I21.3       3. PAH (pulmonary artery hypertension) ([facility])  I27.21       4. Mixed hyperlipidemia  E78.2       5. Status post insertion of drug eluting coronary artery stent  Z95.5       6. Aortic ectasia, thoracic ([facility])  I77.810       7. History of ASCVD  Z86.79 High sensitivity CRP      8. Atherosclerosis of native coronary artery of native heart without angina pectoris  I25.10 High sensitivity CRP      9. H/o STEMI S/P BEAN  I21.3 metoprolol tartrate (LOPRESSOR) 25 mg tablet     losartan (COZAAR) 25 mg tablet     ticagrelor (BRILINTA) 90 MG     atorvastatin (LIPITOR) 80 mg tablet         Coronary artery disease involving native coronary artery of native heart without angina pectoris  Mr. Jerome Coffey is overall doing well from no recent recurrence of angina like symptoms or symptoms of heart failure.  His exercise tolerance has improved and he is going to cardiac rehab.  Blood pressure is reasonable.  He is on good medical regimen.  -- He is advised to continue current medical therapy.  -- Encouraging him to complete cardiac rehab and to exercise himself after that.  -- We discussed about addition of colchicine for anti-inflammatory effect which he mention.  I advised that it can be considered if he has any inflammation.  I am adding a high-sensitivity CRP level to be checked with his next routine blood work.      Aortic ectasia, thoracic ([facility])  -- Ectasia of ascending aorta but considering his tall height it is reasonable.  No indication to do a CT scan of the chest at this time.  But will consider this if blood pressure remains elevated or if there is  interval increase based on following echocardiogram in 1 year time.    Status post insertion of drug eluting coronary artery stent  He is advised to continue uninterrupted dual antiplatelet therapy for 1 year time following which Brilinta will be discontinued and aspirin continued.  He is advised to continue high intensity statin therapy.    Hyperlipidemia  We will continue his current statin therapy along with omega-3 fatty acids.  He should have a follow-up lipids checked in 3 to 6 months.  Goal is to bring LDL 50% from baseline or less than 70 mg/dL.  -- Encouraging to continue physical activity and exercise.    PAH (pulmonary artery hypertension) ([facility])  He was noted to have moderate pulmonary hypertension on last echocardiogram when he was admitted with MI.  He gives no history that suggest obstructive sleep apnea.  His exercise tolerance is good.  On examination there is no objective evidence of heart failure.  -- Encouraging continue current medications and normal activities and to keep an eye on blood pressure control and weight.  -- We will do a limited echocardiogram in 6 months time prior to next visit to reassess pulmonary artery pressures.  -- Dietary and medical compliance are reinforced.  -- He is advised  to report any concerning symptoms such as chest pain, shortness of breath, decline in exercise tolerance or presyncope/syncope.     INTERVAL HISTORY, HISTORY OF PRESENT ILLNESS & COMPREHENSIVE VISIT INFORMATION     Jerome Coffey is here for follow-up regarding his cardiac comorbidities which include: Coronary artery disease, history of MI February 2024, hypertension, dyslipidemia, ectasia of ascending aorta and other comorbidities.    He is here for follow-up visit accompanied with his wife.  Since last hospitalization he has had no new diagnosis or hospitalizations or significant illnesses.  He reports that he has been overall feeling well.  He has had no recurrence of unusual chest pain or  shortness of breath or dizziness or lightheadedness or palpitations or passing out or near passing out.  He does occasionally have right and left frontal headaches.  He mentions that he was experiencing this more often before his cardiac intervention and after the intervention his symptoms resolved before coming back again until about last 3 days back when they have dissipated again.  Denies accompanying symptoms.  Has been going to cardiac rehab and I have reviewed the most recent report.  He is progressing well and his blood pressures have been all right.  He has had no significant ectopy with exercises.    Functional capacity status: Good   (Excellent- >10 METs; Good: (7-10 METs); Moderate (4-7 METs); Poor (<= 4 METs)    Any chronic stressors: None   (feeling of poor health, financial problems, and social isolation etc).    Tobacco or alcohol dependence: He has never been a smoker.  Reports drinking alcohol very rarely.    Current cardiac medications: Atorvastatin 80 mg daily losartan 25 mg daily metoprolol succinate 12.5 mg twice daily aspirin 81 mg daily ticagrelor 90 mg twice daily omega-3 fatty acids    Last recent comprehensive blood work available: GFR 88 magnesium 2.1  Blood work 2/20/2024 sodium 137 potassium 4.2 chloride 108 bicarb 24 BUN 14 creatinine 0.89  Lipid profile 3/28/2024 total cholesterol 107  triglyceride 197 HDL 32 calculated LDL 36.  Hemoglobin 13.9 hematocrit 40.6 platelet count 180  TSH 1.59  Hemoglobin A1c 5.8    REVIEW OF SYSTEMS   Positive for: As noted above in HPI  Negative for: All remaining as reviewed below and in HPI.    SYSTEM SYMPTOMS REVIEWED:  General--weight change, fever, night sweats  Respiratory--cough, wheezing, shortness of breath, sputum production  Cardiovascular--chest pain, syncope, dyspnea on exertion, edema, decline in exercise tolerance, claudication   Gastrointestinal--persistent vomiting, diarrhea, abdominal distention, blood in stool   Muscular or  skeletal--joint pain or swelling   Neurologic--headaches, syncope, abnormal movement  Hematologic--history of easy bruising and bleeding   Endocrine--thyroid enlargement, heat or cold intolerance, polyuria   Psychiatric--anxiety, depression     *-*-*-*-*-*-*-*-*-*-*-*-*-*-*-*-*-*-*-*-*-*-*-*-*-*-*-*-*-*-*-*-*-*-*-*-*-*-*-*-*-*-*-*-*-*-*-*-*-*-*-*-*-*-  VITAL SIGNS     CURRENT VITAL SIGNS:   Vitals:    04/02/24 0923   BP: 132/78   BP Location: Left arm   Patient Position: Sitting   Cuff Size: Large   Pulse: 58   SpO2: 95%   Weight: 110 kg (243 lb)       BMI: Body mass index is 32.06 kg/m².    WEIGHTS:   Wt Readings from Last 25 Encounters:   04/02/24 110 kg (243 lb)   02/26/24 111 kg (244 lb)   02/20/24 117 kg (258 lb 9.6 oz)        *-*-*-*-*-*-*-*-*-*-*-*-*-*-*-*-*-*-*-*-*-*-*-*-*-*-*-*-*-*-*-*-*-*-*-*-*-*-*-*-*-*-*-*-*-*-*-*-*-*-*-*-*-*-  PHYSICAL EXAM     General Appearance:    Alert, cooperative, no distress, appears stated age, tall well-built, increased BMI   Head, Eyes, ENT:    No obvious abnormality, moist mucous mebranes.   Neck:   Supple, no carotid bruit. No JVD, lo obvious lymphadenoapthy   Back:     Symmetric, no curvature.   Lungs:     Respirations unlabored. Clear to auscultation bilaterally,    Chest wall:    No tenderness or deformity   Heart:    Regular rate and rhythm, S1 and S2 normal, no murmur, rub  or gallop.   Abdomen:     Soft, non-tender,    Extremities:   Extremities warm, no cyanosis or edema    Skin:   No venostatic changes in lower extremities. Normal skin color, texture, and turgor. No rashes or lesions   Neuro: Pt is alert and oriented time 3 with no gross motor deficits.   Psychiatric/Behavioral: Mood is normal. Behavior is normal.     *-*-*-*-*-*-*-*-*-*-*-*-*-*-*-*-*-*-*-*-*-*-*-*-*-*-*-*-*-*-*-*-*-*-*-*-*-*-*-*-*-*-*-*-*-*-*-*-*-*-*-*-*-*-  CURRENT MEDICATIONS LIST     Current Outpatient Medications:     allopurinol (ZYLOPRIM) 100 mg tablet, Take 100 mg by mouth daily, Disp: , Rfl:  "    aspirin 81 mg chewable tablet, Chew 1 tablet (81 mg total) daily, Disp: 60 tablet, Rfl: 0    atorvastatin (LIPITOR) 80 mg tablet, Take 1 tablet (80 mg total) by mouth daily with dinner, Disp: 90 tablet, Rfl: 3    Boswellia-Glucosamine-Vit D (Osteo Bi-Flex One Per Day) TABS, Take by mouth, Disp: , Rfl:     colchicine (COLCRYS) 0.6 mg tablet, One tid prn gout, Disp: 21 tablet, Rfl: 0    losartan (COZAAR) 25 mg tablet, Take 1 tablet (25 mg total) by mouth daily, Disp: 90 tablet, Rfl: 3    metoprolol tartrate (LOPRESSOR) 25 mg tablet, Take 0.5 tablets (12.5 mg total) by mouth every 12 (twelve) hours, Disp: 90 tablet, Rfl: 3    Multiple Vitamins-Minerals (MENS MULTIVITAMIN PO), , Disp: , Rfl:     Omega-3 Fatty Acids (Fish Oil Omega-3) 1000 MG CAPS, Take 2 g by mouth daily, Disp: , Rfl:     ticagrelor (BRILINTA) 90 MG, Take 1 tablet (90 mg total) by mouth every 12 (twelve) hours, Disp: 60 tablet, Rfl: 11    colchicine (COLCRYS) 0.6 mg tablet, , Disp: , Rfl:     Ergocalciferol (VITAMIN D2 PO), Take by mouth (Patient not taking: Reported on 4/2/2024), Disp: , Rfl:     Glucosamine 750 MG TABS, Take 2 tablets by mouth daily (Patient not taking: Reported on 4/2/2024), Disp: , Rfl:     nitroglycerin (NITROSTAT) 0.4 mg SL tablet, Place 1 tablet (0.4 mg total) under the tongue every 5 (five) minutes as needed for chest pain, Disp: 100 tablet, Rfl: 0       ALLERGIES     Allergies   Allergen Reactions    Clindamycin GI Intolerance       *-*-*-*-*-*-*-*-*-*-*-*-*-*-*-*-*-*-*-*-*-*-*-*-*-*-*-*-*-*-*-*-*-*-*-*-*-*-*-*-*-*-*-*-*-*-*-*-*-*-*-*-*-*-  LABORATORY DATA   No results found for: \"NA\"  Potassium   Date Value Ref Range Status   02/20/2024 4.2 3.5 - 5.3 mmol/L Final     Comment:     Slightly Hemolyzed:Results may be affected.   02/19/2024 4.1 3.5 - 5.3 mmol/L Final     Comment:     Slightly Hemolyzed:Results may be affected.   12/21/2023 4.2 3.5 - 5.2 mmol/L Final   07/13/2023 4.3 3.5 - 5.2 mmol/L Final   12/21/2022 4.1 3.5 - " 5.2 mmol/L Final     Chloride   Date Value Ref Range Status   02/20/2024 108 96 - 108 mmol/L Final   02/19/2024 104 96 - 108 mmol/L Final   12/21/2023 103 100 - 109 mmol/L Final   07/13/2023 105 100 - 109 mmol/L Final   12/21/2022 105 100 - 109 mmol/L Final     Carbon Dioxide   Date Value Ref Range Status   12/21/2023 31 21 - 31 mmol/L Final   07/13/2023 27 21 - 31 mmol/L Final   12/21/2022 29 23 - 31 mmol/L Final     CO2   Date Value Ref Range Status   02/20/2024 24 21 - 32 mmol/L Final   02/19/2024 31 21 - 32 mmol/L Final     BUN   Date Value Ref Range Status   02/20/2024 14 5 - 25 mg/dL Final   02/19/2024 17 5 - 25 mg/dL Final   12/21/2023 16 7 - 28 mg/dL Final   07/13/2023 18 7 - 28 mg/dL Final   12/21/2022 19 7 - 28 mg/dL Final     Creatinine   Date Value Ref Range Status   02/20/2024 0.89 0.60 - 1.30 mg/dL Final     Comment:     Standardized to IDMS reference method   02/19/2024 1.16 0.60 - 1.30 mg/dL Final     Comment:     Standardized to IDMS reference method   12/21/2023 1.09 0.53 - 1.30 mg/dL Final   07/13/2023 1.09 0.53 - 1.30 mg/dL Final   12/21/2022 1.19 0.53 - 1.30 mg/dL Final     GFR, Calculated   Date Value Ref Range Status   12/18/2020 68 >60 mL/min/1.73m2 Final     Comment:     mL/min per 1.73 square meters                                            Normal Function or Mild Renal    Disease (if clinically at risk):  >or=60  Moderately Decreased:                30-59  Severely Decreased:                  15-29  Renal Failure:                         <15                                            -American GFR: multiply reported GFR by 1.16    Please note that the eGFR is based on the CKD-EPI calculation, and is not intended to be used for drug dosing.                                            Note: Calculated GFR may not be an accurate indicator of renal function if the patient's renal function is not in a steady state.   09/21/2020 76 >60 mL/min/1.73m2 Final     Comment:     mL/min per 1.73  square meters                                            Normal Function or Mild Renal    Disease (if clinically at risk):  >or=60  Moderately Decreased:                30-59  Severely Decreased:                  15-29  Renal Failure:                         <15                                            -American GFR: multiply reported GFR by 1.16    Please note that the eGFR is based on the CKD-EPI calculation, and is not intended to be used for drug dosing.                                            Note: Calculated GFR may not be an accurate indicator of renal function if the patient's renal function is not in a steady state.   08/02/2019 56 (L) >60 mL/min/1.73m2 Final     Comment:     mL/min per 1.73 square meters                                            Normal Function or Mild Renal    Disease (if clinically at risk):  >or=60  Moderately Decreased:                30-59  Severely Decreased:                  15-29  Renal Failure:                         <15                                            -American GFR: multiply reported GFR by 1.16    Please note that the eGFR is based on the CKD-EPI calculation, and is not intended to be used for drug dosing.                                            Note: Calculated GFR may not be an accurate indicator of renal function if the patient's renal function is not in a steady state.     eGFRcr   Date Value Ref Range Status   12/21/2023 74 >59 Final   07/13/2023 74 >59 Final   12/21/2022 67 >59 Final     eGFR   Date Value Ref Range Status   02/20/2024 88 ml/min/1.73sq m Final   02/19/2024 64 ml/min/1.73sq m Final     Calcium   Date Value Ref Range Status   02/20/2024 8.0 (L) 8.4 - 10.2 mg/dL Final   02/19/2024 8.9 8.4 - 10.2 mg/dL Final   12/21/2023 9.3 8.5 - 10.1 mg/dL Final   07/13/2023 9.3 8.5 - 10.1 mg/dL Final   12/21/2022 9.6 8.5 - 10.1 mg/dL Final     AST   Date Value Ref Range Status   02/19/2024 27 13 - 39 U/L Final     Comment:     Slightly  "Hemolyzed:Results may be affected.   12/21/2023 18 <41 U/L Final   07/13/2023 19 <41 U/L Final   12/21/2022 19 <41 U/L Final     ALT   Date Value Ref Range Status   02/19/2024 24 7 - 52 U/L Final     Comment:     Specimen collection should occur prior to Sulfasalazine administration due to the potential for falsely depressed results.    12/21/2023 25 <56 U/L Final   07/13/2023 22 <56 U/L Final   12/21/2022 47 <56 U/L Final     Alkaline Phosphatase   Date Value Ref Range Status   02/19/2024 55 34 - 104 U/L Final   12/21/2023 54 35 - 120 U/L Final   07/13/2023 59 35 - 120 U/L Final   12/21/2022 74 35 - 120 U/L Final     Magnesium   Date Value Ref Range Status   02/20/2024 2.1 1.9 - 2.7 mg/dL Final     WBC   Date Value Ref Range Status   02/20/2024 9.45 4.31 - 10.16 Thousand/uL Final   02/19/2024 9.62 4.31 - 10.16 Thousand/uL Final     Hemoglobin   Date Value Ref Range Status   02/20/2024 13.9 12.0 - 17.0 g/dL Final   02/19/2024 16.0 12.0 - 17.0 g/dL Final     Platelets   Date Value Ref Range Status   02/20/2024 180 149 - 390 Thousands/uL Final   02/19/2024 211 149 - 390 Thousands/uL Final     PTT   Date Value Ref Range Status   02/19/2024 26 23 - 37 seconds Final     Comment:     Therapeutic Heparin Range =  60-90 seconds     INR   Date Value Ref Range Status   02/19/2024 0.93 0.84 - 1.19 Final     No results found for: \"CK\", \"CKMB\", \"DIGOXIN\"  No results found for: \"TSH\"  No results found for: \"CHOL\"   Hemoglobin A1C   Date Value Ref Range Status   02/20/2024 5.8 (H) Normal 4.0-5.6%; PreDiabetic 5.7-6.4%; Diabetic >=6.5%; Glycemic control for adults with diabetes <7.0% % Final     No results found for: \"BLOODCX\", \"SPUTUMCULTUR\", \"GRAMSTAIN\", \"URINECX\", \"WOUNDCULT\", \"BODYFLUIDCUL\", \"MRSACULTURE\", \"INFLUAPCR\", \"INFLUBPCR\", \"RSVPCR\", \"LEGIONELLAUR\", \"CDIFFTOXINB\"    *-*-*-*-*-*-*-*-*-*-*-*-*-*-*-*-*-*-*-*-*-*-*-*-*-*-*-*-*-*-*-*-*-*-*-*-*-*-*-*-*-*-*-*-*-*-*-*-*-*-*-*-*-*-  PREVIOUS CARDIOLOGY & RADIOLOGY TEST RESULTS "   I have personally reviewed pertinent results of cardiovascular tests noted below.    No results found for this or any previous visit.    No results found for this or any previous visit.    No results found for this or any previous visit.    No results found for this or any previous visit.    Cardiac catheterization    Mid RCA lesion is 100% stenosed.    The angioplasty was pre-stent angioplasty.    1v CAD        *-*-*-*-*-*-*-*-*-*-*-*-*-*-*-*-*-*-*-*-*-*-*-*-*-*-*-*-*-*-*-*-*-*-*-*-*-*-*-*-*-*-*-*-*-*-*-*-*-*-*-*-*-*-  SIGNATURES:   @SIG@   Reji Baum MD; [facility]    *-*-*-*-*-*-*-*-*-*-*-*-*-*-*-*-*-*-*-*-*-*-*-*-*-*-*-*-*-*-*-*-*-*-*-*-*-*-*-*-*-*-*-*-*-*-*-*-*-*-*-*-*-*-  PAST MEDICAL HISTORY:  Past Medical History:   Diagnosis Date    Erectile dysfunction     Knee pain, right     Renal calculi     PAST SURGICAL HISTORY:  Past Surgical History:   Procedure Laterality Date    CARDIAC CATHETERIZATION N/A 2/19/2024    Procedure: Cardiac pci;  Surgeon: Tl Dominique MD;  Location: [facility];  Service: Cardiology         FAMILY HISTORY:  No family history on file. SOCIAL HISTORY:  Social History     Tobacco Use   Smoking Status Never   Smokeless Tobacco Never      Social History     Substance and Sexual Activity   Alcohol Use Yes    Comment: social drinker     Social History     Substance and Sexual Activity   Drug Use Never    [unfilled]     *-*-*-*-*-*-*-*-*-*-*-*-*-*-*-*-*-*-*-*-*-*-*-*-*-*-*-*-*-*-*-*-*-*-*-*-*-*-*-*-*-*-*-*-*-*-*-*-*-*-*-*-*-*  ALLERGIES:  Allergies   Allergen Reactions    Clindamycin GI Intolerance    CURRENT SCHEDULED MEDICATIONS:    Current Outpatient Medications:     allopurinol (ZYLOPRIM) 100 mg tablet, Take 100 mg by mouth daily, Disp: , Rfl:     aspirin 81 mg chewable tablet, Chew 1 tablet (81 mg total) daily, Disp: 60 tablet, Rfl: 0    atorvastatin (LIPITOR) 80 mg tablet, Take 1 tablet (80 mg total) by mouth daily with dinner, Disp: 90 tablet, Rfl: 3    Boswellia-Glucosamine-Vit  D (Osteo Bi-Flex One Per Day) TABS, Take by mouth, Disp: , Rfl:     colchicine (COLCRYS) 0.6 mg tablet, One tid prn gout, Disp: 21 tablet, Rfl: 0    losartan (COZAAR) 25 mg tablet, Take 1 tablet (25 mg total) by mouth daily, Disp: 90 tablet, Rfl: 3    metoprolol tartrate (LOPRESSOR) 25 mg tablet, Take 0.5 tablets (12.5 mg total) by mouth every 12 (twelve) hours, Disp: 90 tablet, Rfl: 3    Multiple Vitamins-Minerals (MENS MULTIVITAMIN PO), , Disp: , Rfl:     Omega-3 Fatty Acids (Fish Oil Omega-3) 1000 MG CAPS, Take 2 g by mouth daily, Disp: , Rfl:     ticagrelor (BRILINTA) 90 MG, Take 1 tablet (90 mg total) by mouth every 12 (twelve) hours, Disp: 60 tablet, Rfl: 11    colchicine (COLCRYS) 0.6 mg tablet, , Disp: , Rfl:     Ergocalciferol (VITAMIN D2 PO), Take by mouth (Patient not taking: Reported on 4/2/2024), Disp: , Rfl:     Glucosamine 750 MG TABS, Take 2 tablets by mouth daily (Patient not taking: Reported on 4/2/2024), Disp: , Rfl:     nitroglycerin (NITROSTAT) 0.4 mg SL tablet, Place 1 tablet (0.4 mg total) under the tongue every 5 (five) minutes as needed for chest pain, Disp: 100 tablet, Rfl: 0     *-*-*-*-*-*-*-*-*-*-*-*-*-*-*-*-*-*-*-*-*-*-*-*-*-*-*-*-*-*-*-*-*-*-*-*-*-*-*-*-*-*-*-*-*-*-*-*-*-*-*-*-*-*

## 2024-04-03 ENCOUNTER — CLINICAL SUPPORT (OUTPATIENT)
Dept: CARDIAC REHAB | Facility: CLINIC | Age: 69
End: 2024-04-03
Payer: MEDICARE

## 2024-04-03 DIAGNOSIS — I21.3 ST ELEVATION MYOCARDIAL INFARCTION (STEMI), UNSPECIFIED ARTERY (HCC): Primary | ICD-10-CM

## 2024-04-03 PROCEDURE — 93798 PHYS/QHP OP CAR RHAB W/ECG: CPT

## 2024-04-05 ENCOUNTER — CLINICAL SUPPORT (OUTPATIENT)
Dept: CARDIAC REHAB | Facility: CLINIC | Age: 69
End: 2024-04-05
Payer: MEDICARE

## 2024-04-05 DIAGNOSIS — I21.3 ST ELEVATION MYOCARDIAL INFARCTION (STEMI), UNSPECIFIED ARTERY (HCC): Primary | ICD-10-CM

## 2024-04-05 PROCEDURE — 93798 PHYS/QHP OP CAR RHAB W/ECG: CPT

## 2024-04-08 ENCOUNTER — APPOINTMENT (OUTPATIENT)
Dept: CARDIAC REHAB | Facility: CLINIC | Age: 69
End: 2024-04-08
Payer: MEDICARE

## 2024-04-10 ENCOUNTER — CLINICAL SUPPORT (OUTPATIENT)
Dept: CARDIAC REHAB | Facility: CLINIC | Age: 69
End: 2024-04-10
Payer: MEDICARE

## 2024-04-10 DIAGNOSIS — I21.3 ST ELEVATION MYOCARDIAL INFARCTION (STEMI), UNSPECIFIED ARTERY (HCC): Primary | ICD-10-CM

## 2024-04-10 PROCEDURE — 93798 PHYS/QHP OP CAR RHAB W/ECG: CPT

## 2024-04-12 ENCOUNTER — CLINICAL SUPPORT (OUTPATIENT)
Dept: CARDIAC REHAB | Facility: CLINIC | Age: 69
End: 2024-04-12
Payer: MEDICARE

## 2024-04-12 DIAGNOSIS — I21.3 ST ELEVATION MYOCARDIAL INFARCTION (STEMI), UNSPECIFIED ARTERY (HCC): Primary | ICD-10-CM

## 2024-04-12 PROCEDURE — 93798 PHYS/QHP OP CAR RHAB W/ECG: CPT

## 2024-04-15 ENCOUNTER — CLINICAL SUPPORT (OUTPATIENT)
Dept: CARDIAC REHAB | Facility: CLINIC | Age: 69
End: 2024-04-15
Payer: MEDICARE

## 2024-04-15 DIAGNOSIS — I21.3 ST ELEVATION MYOCARDIAL INFARCTION (STEMI), UNSPECIFIED ARTERY (HCC): Primary | ICD-10-CM

## 2024-04-15 PROCEDURE — 93798 PHYS/QHP OP CAR RHAB W/ECG: CPT

## 2024-04-17 ENCOUNTER — CLINICAL SUPPORT (OUTPATIENT)
Dept: CARDIAC REHAB | Facility: CLINIC | Age: 69
End: 2024-04-17
Payer: MEDICARE

## 2024-04-17 DIAGNOSIS — I21.3 ST ELEVATION MYOCARDIAL INFARCTION (STEMI), UNSPECIFIED ARTERY (HCC): Primary | ICD-10-CM

## 2024-04-17 PROCEDURE — 93798 PHYS/QHP OP CAR RHAB W/ECG: CPT

## 2024-04-19 ENCOUNTER — CLINICAL SUPPORT (OUTPATIENT)
Dept: CARDIAC REHAB | Facility: CLINIC | Age: 69
End: 2024-04-19
Payer: MEDICARE

## 2024-04-19 DIAGNOSIS — I21.3 ST ELEVATION MYOCARDIAL INFARCTION (STEMI), UNSPECIFIED ARTERY (HCC): Primary | ICD-10-CM

## 2024-04-19 PROCEDURE — 93798 PHYS/QHP OP CAR RHAB W/ECG: CPT

## 2024-04-22 ENCOUNTER — CLINICAL SUPPORT (OUTPATIENT)
Dept: CARDIAC REHAB | Facility: CLINIC | Age: 69
End: 2024-04-22
Payer: MEDICARE

## 2024-04-22 DIAGNOSIS — I21.3 ST ELEVATION MYOCARDIAL INFARCTION (STEMI), UNSPECIFIED ARTERY (HCC): Primary | ICD-10-CM

## 2024-04-22 PROCEDURE — 93798 PHYS/QHP OP CAR RHAB W/ECG: CPT

## 2024-04-24 ENCOUNTER — CLINICAL SUPPORT (OUTPATIENT)
Dept: CARDIAC REHAB | Facility: CLINIC | Age: 69
End: 2024-04-24
Payer: MEDICARE

## 2024-04-24 DIAGNOSIS — I21.3 ST ELEVATION MYOCARDIAL INFARCTION (STEMI), UNSPECIFIED ARTERY (HCC): Primary | ICD-10-CM

## 2024-04-24 PROCEDURE — 93798 PHYS/QHP OP CAR RHAB W/ECG: CPT

## 2024-04-26 ENCOUNTER — CLINICAL SUPPORT (OUTPATIENT)
Dept: CARDIAC REHAB | Facility: CLINIC | Age: 69
End: 2024-04-26
Payer: MEDICARE

## 2024-04-26 DIAGNOSIS — I21.3 ST ELEVATION MYOCARDIAL INFARCTION (STEMI), UNSPECIFIED ARTERY (HCC): Primary | ICD-10-CM

## 2024-04-26 PROCEDURE — 93798 PHYS/QHP OP CAR RHAB W/ECG: CPT

## 2024-04-26 NOTE — PROGRESS NOTES
Cardiac Rehabilitation Plan of Care   60 DAY        Today's date: 2024   # of Exercise Sessions Completed: 22  Patient name: Jerome Coffey      : 1955  Age: 69 y.o.       MRN: [id number]  Referring Physician: Tl Dominique MD  Cardiologist: Reji Baum MD    Provider: [facility]  Clinician: Briana Peterson MS, CEP    Dx:   Encounter Diagnosis   Name Primary?    ST elevation myocardial infarction (STEMI), unspecified artery (HCC) Yes         Description of Diagnosis:  BEAN to Mid RCA lesion which was 100% stenosed    Date of onset: 24    SUMMARY OF PROGRESS: Jerome continues to be compliant with exercise in cardiac rehab 3 days/week. He has also gone back to the gym 2 days/week using the elliptical and upright cycle, but has found the most immediate benefit in stretching after exercise 2 days/week. We have stopped using the treadmill due to back pain and he does enjoy the arm bike for shoulder ROM. In cardiac rehab, Jerome tolerates 40-45 min of exercise at 4-5.2 METs (increased). NSR noted on telemetry. Normal resting HR and BP with normal HR and BP response to exercise. He has lost 2 lbs in the past week and just started using Noom. Previously, he has gotten down to 220 lb using Noom and hopes to do that again. Today his weigh tin cardiac rehab is 244.4 lb.     Medication compliance: Yes   Comments: Pt reports to be compliant with medications    Fall Risk: Low   Comments: Ambulates with a steady gait with no assist device and Denies a fall in the past 6 months      EXERCISE ASSESSMENT and PLAN    ECG Interpretation: NSR    SMART Goals:   10% improvement in functional capacity based on max METs achieved in initial fitness assessment  improved DASI score by 10%  increased exercise capacity by 40% based on peak METs tolerated in cardiac rehab exercise session  maintain > 150 minutes per week of moderate intensity exercise    Patient Specific EXERCISE GOALS:   (home exercise, ADLs, recreation):    return to gym with increased confidence - focusing on aerobic exercise, minimizing muscular hypertrophy    Patient's progress toward SMART and personal goals: consistent with cardiac rehab 3 days/week, increased intensity of exercise, returned to exercise at the gym 2 days/week    Plan For next 30 days: home exercise 30+ mins 2 days opposite CR and increased METs in cardiac rehab    Current Aerobic Exercise Prescription:      Frequency: 3 days/week       Minutes: 40-45         METS: 4-5.2           HR:    RPE: 4-6         Modalities: UBE, Lifecycle, and Elliptical (no treadmill d/t back pain; UBE helps shoulder)     Exercise workloads will be progressed gradually as tolerated, within limits of patient's ability, and according to the patient's response to the exercise program.      30 Day Goals for Aerobic Exercise Progression:    Frequency: 3 days/week of cardiac rehab       Supplement with home exercise 2+ days/wk as tolerated    Minutes: 40 -45                           >150 mins/wk of moderate intensity exercise   METS: 4.5-6.0   HR: 106-125 bpm (50-70% HRR)     RPE: 4-6   Modalities: UBE, Lifecycle, Elliptical, and Rower    Strength trainin-3 days / week  12-15 repetitions  1-2 sets per modality    Modalities: Leg Press, Chest Press, Pull Downs, Arm Extension, Arm Curl, Front Raises, Shoulder Shrugs, Calf Raises, and Continue PT shoulder exercises at home    Home Exercise: Type: Elliptical and upright bike at PF, Frequency: 2 days/week, Duration: 20-30 mins - including stretching    Group and Individual Education: benefit of exercise for CAD risk factors, home exercise guidelines, AHA guidelines to achieve >150 mins/wk of moderate exercise, RPE scale, and Group class: Risk Factors for Heart Disease       Readiness to change: Action:  (Changing behavior)      NUTRITION ASSESSMENT AND PLAN    Weight control:    Starting weight: 246.4 lb   Current weight:   244.4 lb    Waist circumference:    Starting:  "44 in   Current:      Diabetes: N/A  A1c: 5.8%    last measured: 2/20/24    Lipid management: Last lipid profile 3/28/24  Chol 107    HDL 32  LDL 36    SMART Goals:   TRG <150, reduced waist circumference <40 inches (M), and Improved Rate Your Plate score  >64    Patient Specific NUTRITION GOALS:  (wt control, diabetes management, dietary modifications): weight loss 220 lbs    Patient's progress toward SMART and personal goals: weight loss 2 lbs, started Noom for tracking and weight loss suggestions    Plan for next 30 days: increase daily intake of fruits and vegetables, eat more meatless meals, eat reduced-fat or part-skim cheese or rarely eat, use \"light\" tub margarine, choose low sugar desserts and sweets, and drink no more than 1-2 alcoholic drinks in a day    Group and Individual Education: heart healthy eating principles  low sodium diet  nutrition for  lipid management  group class: Heart Healthy Eating  group class:  Label Reading    Readiness to change: Action:  (Changing behavior)      PSYCHOSOCIAL ASSESSMENT AND PLAN    Emotional:  Depression assessment:  PHQ-9 = 1  1-4 = Minimal Depression            Anxiety measure:  KATERINA-7 = 1  0-4  = Not anxious    Assessment of depression and anxiety    Patient reports feelings of depression /anxiety since his MI  Reports sufficient emotional support from family    Self-reported stress level:  3   Stress Management: Practice Relaxation Techniques and Exercise    Patient's rating of Social support: Very Good   Social Support Network: spouse, children, and grandchildren    Psychosocial Assessment as it relates to rehabilitation:   Patient denies issues with his/her family or home life that may affect their rehabilitation efforts.     SMART Goals:      Increased interest and pleasure in doing things and reduced time feeling nervous or on edge    Patient Specific PSYCHOCOSOCIAL GOALS:  (stress, emotional well being, social support):   reduced anxiety about " health    Patient's progress toward SMART and personal goals: utilizing good support from family, pt denies feelings of depression or anxiety, feeling good and happy to be back at the gym.    Plan For next 30 days: Practice relaxation techniques, Exercise, Keep a positive mindset, and Enjoy family    Group and Individual Education: benefits of a positive support system, depression and CAD, and class:  Stress and Your Health     Readiness to change: Action:  (Changing behavior)      OTHER CORE COMPONENTS     Tobacco:   Social History     Tobacco Use   Smoking Status Never   Smokeless Tobacco Never       Tobacco Use Intervention:   N/A:  Patient is a non-smoker     Anginal Symptoms:   Arm pain   NTG use: Compliant with carrying NTG, Understands proper use, and Pt has not used NTG since event     Blood pressure:    Restin/68 - 136/68   Exercise: 140/72 - 150/80    SMART Goals: consistent, controlled resting BP < 130/80      Patient Specific CORE COMPONENT GOALS (smoking, BP control, angina control, medication): reduced dietary sodium <2000mg and medication compliance    Patient's progress toward SMART and personal goals: reduced dietary sodium, normal resting and exercise BP, 100% medication compliance    Plan For next 30 days: medication compliance, avoid processed foods, engage in regular exercise, and monitor home BP    Group and Individual Education:  understanding high blood pressure and it's relationship to CAD, group class: Understanding Heart Disease, and group class:  Common Heart Medications    Readiness to change: Action:  (Changing behavior)

## 2024-04-29 ENCOUNTER — CLINICAL SUPPORT (OUTPATIENT)
Dept: CARDIAC REHAB | Facility: CLINIC | Age: 69
End: 2024-04-29
Payer: MEDICARE

## 2024-04-29 DIAGNOSIS — I21.3 ST ELEVATION MYOCARDIAL INFARCTION (STEMI), UNSPECIFIED ARTERY (HCC): Primary | ICD-10-CM

## 2024-04-29 PROCEDURE — 93798 PHYS/QHP OP CAR RHAB W/ECG: CPT

## 2024-05-01 ENCOUNTER — CLINICAL SUPPORT (OUTPATIENT)
Dept: CARDIAC REHAB | Facility: CLINIC | Age: 69
End: 2024-05-01
Payer: MEDICARE

## 2024-05-01 DIAGNOSIS — I21.3 ST ELEVATION MYOCARDIAL INFARCTION (STEMI), UNSPECIFIED ARTERY (HCC): Primary | ICD-10-CM

## 2024-05-01 PROCEDURE — 93798 PHYS/QHP OP CAR RHAB W/ECG: CPT

## 2024-05-03 ENCOUNTER — CLINICAL SUPPORT (OUTPATIENT)
Dept: CARDIAC REHAB | Facility: CLINIC | Age: 69
End: 2024-05-03
Payer: MEDICARE

## 2024-05-03 DIAGNOSIS — I21.3 ST ELEVATION MYOCARDIAL INFARCTION (STEMI), UNSPECIFIED ARTERY (HCC): Primary | ICD-10-CM

## 2024-05-03 PROCEDURE — 93798 PHYS/QHP OP CAR RHAB W/ECG: CPT

## 2024-05-06 ENCOUNTER — CLINICAL SUPPORT (OUTPATIENT)
Dept: CARDIAC REHAB | Facility: CLINIC | Age: 69
End: 2024-05-06
Payer: MEDICARE

## 2024-05-06 DIAGNOSIS — I21.3 ST ELEVATION MYOCARDIAL INFARCTION (STEMI), UNSPECIFIED ARTERY (HCC): Primary | ICD-10-CM

## 2024-05-06 PROCEDURE — 93798 PHYS/QHP OP CAR RHAB W/ECG: CPT

## 2024-05-07 ENCOUNTER — CLINICAL SUPPORT (OUTPATIENT)
Dept: CARDIAC REHAB | Facility: CLINIC | Age: 69
End: 2024-05-07
Payer: MEDICARE

## 2024-05-07 DIAGNOSIS — I21.3 ST ELEVATION MYOCARDIAL INFARCTION (STEMI), UNSPECIFIED ARTERY (HCC): Primary | ICD-10-CM

## 2024-05-07 PROCEDURE — 93798 PHYS/QHP OP CAR RHAB W/ECG: CPT

## 2024-05-08 ENCOUNTER — APPOINTMENT (OUTPATIENT)
Dept: CARDIAC REHAB | Facility: CLINIC | Age: 69
End: 2024-05-08
Payer: MEDICARE

## 2024-05-08 DIAGNOSIS — I21.3 STEMI (ST ELEVATION MYOCARDIAL INFARCTION) (HCC): ICD-10-CM

## 2024-05-09 ENCOUNTER — TELEPHONE (OUTPATIENT)
Dept: CARDIOLOGY CLINIC | Facility: CLINIC | Age: 69
End: 2024-05-09

## 2024-05-09 NOTE — TELEPHONE ENCOUNTER
Pt called in regards to medications metoprolol tartrate (LOPRESSOR) 25 mg tablet , losartan (COZAAR) 25 mg tablet and  ticagrelor (BRILINTA) 90 MG . Pt was unsure if he needed to contact the pharmacy or us to refill medications. Informed pt that the scripts are sent to the pharmacy and contact them to refill medications. Also, pt wanted the updated telephone number to the practice. Informed of the telephone number to pt. Pt verbalized understanding.

## 2024-05-10 ENCOUNTER — CLINICAL SUPPORT (OUTPATIENT)
Dept: CARDIAC REHAB | Facility: CLINIC | Age: 69
End: 2024-05-10
Payer: MEDICARE

## 2024-05-10 DIAGNOSIS — I21.3 ST ELEVATION MYOCARDIAL INFARCTION (STEMI), UNSPECIFIED ARTERY (HCC): Primary | ICD-10-CM

## 2024-05-10 PROCEDURE — 93798 PHYS/QHP OP CAR RHAB W/ECG: CPT

## 2024-05-13 ENCOUNTER — CLINICAL SUPPORT (OUTPATIENT)
Dept: CARDIAC REHAB | Facility: CLINIC | Age: 69
End: 2024-05-13
Payer: MEDICARE

## 2024-05-13 DIAGNOSIS — I21.3 ST ELEVATION MYOCARDIAL INFARCTION (STEMI), UNSPECIFIED ARTERY (HCC): Primary | ICD-10-CM

## 2024-05-13 PROCEDURE — 93798 PHYS/QHP OP CAR RHAB W/ECG: CPT

## 2024-05-15 ENCOUNTER — CLINICAL SUPPORT (OUTPATIENT)
Dept: CARDIAC REHAB | Facility: CLINIC | Age: 69
End: 2024-05-15
Payer: MEDICARE

## 2024-05-15 DIAGNOSIS — I21.3 ST ELEVATION MYOCARDIAL INFARCTION (STEMI), UNSPECIFIED ARTERY (HCC): Primary | ICD-10-CM

## 2024-05-15 PROCEDURE — 93798 PHYS/QHP OP CAR RHAB W/ECG: CPT

## 2024-05-17 ENCOUNTER — APPOINTMENT (OUTPATIENT)
Dept: CARDIAC REHAB | Facility: CLINIC | Age: 69
End: 2024-05-17
Payer: MEDICARE

## 2024-05-20 ENCOUNTER — APPOINTMENT (OUTPATIENT)
Dept: CARDIAC REHAB | Facility: CLINIC | Age: 69
End: 2024-05-20
Payer: MEDICARE

## 2024-05-22 ENCOUNTER — CLINICAL SUPPORT (OUTPATIENT)
Dept: CARDIAC REHAB | Facility: CLINIC | Age: 69
End: 2024-05-22
Payer: MEDICARE

## 2024-05-22 DIAGNOSIS — I21.3 ST ELEVATION MYOCARDIAL INFARCTION (STEMI), UNSPECIFIED ARTERY (HCC): Primary | ICD-10-CM

## 2024-05-22 PROCEDURE — 93798 PHYS/QHP OP CAR RHAB W/ECG: CPT

## 2024-05-22 NOTE — PROGRESS NOTES
Cardiac Rehabilitation Plan of Care   90 DAY        Today's date: 2024   # of Exercise Sessions Completed: 31  Patient name: Jerome Coffey      : 1955  Age: 69 y.o.       MRN: [id number]  Referring Physician: Tl Dominique MD  Cardiologist: Reji Baum MD    Provider: [facility]  Clinician: Briana Peterson MS, CEP    Dx:   Encounter Diagnosis   Name Primary?    ST elevation myocardial infarction (STEMI), unspecified artery (HCC) Yes         Description of Diagnosis:  BEAN to Mid RCA lesion which was 100% stenosed    Date of onset: 24    SUMMARY OF PROGRESS: Jerome has progressed his exercise to 45-50 min in cardiac rehab at 3.9-5.3 METs. NSR with rare PACs noted on telemetry. Normal resting HR and BP with normal hemodynamic response to exercise. Jerome has resumed exercise at the gym 2-3 days/week including elliptical and bike use plus weight training and stretching. He attempted jogging on the treadmill with one minute intervals but was not happy with his back pain later that day. He has not jogged since. Jerome will be discharged from cardiac rehab next Friday and will continue with his current exercise program (gradual progression) and his healthy diet with Noom.     24: Jerome continues to be compliant with exercise in cardiac rehab 3 days/week. He has also gone back to the gym 2 days/week using the elliptical and upright cycle, but has found the most immediate benefit in stretching after exercise 2 days/week. We have stopped using the treadmill due to back pain and he does enjoy the arm bike for shoulder ROM. In cardiac rehab, Jerome tolerates 40-45 min of exercise at 4-5.2 METs (increased). NSR noted on telemetry. Normal resting HR and BP with normal HR and BP response to exercise. He has lost 2 lbs in the past week and just started using Noom. Previously, he has gotten down to 220 lb using Noom and hopes to do that again. Today his weigh tin cardiac rehab is 244.4 lb.     Medication  compliance: Yes   Comments: Pt reports to be compliant with medications    Fall Risk: Low   Comments: Ambulates with a steady gait with no assist device and Denies a fall in the past 6 months      EXERCISE ASSESSMENT and PLAN    ECG Interpretation: NSR    SMART Goals:   10% improvement in functional capacity based on max METs achieved in initial fitness assessment  improved DASI score by 10%  increased exercise capacity by 40% based on peak METs tolerated in cardiac rehab exercise session  maintain > 150 minutes per week of moderate intensity exercise    Patient Specific EXERCISE GOALS:   (home exercise, ADLs, recreation):   return to gym with increased confidence - focusing on aerobic exercise, minimizing muscular hypertrophy    Patient's progress toward SMART and personal goals: consistent with cardiac rehab 2-3 days/week, increased intensity of exercise, returned to exercise at the gym 2-3 days/week    Plan For next 30 days: home exercise 30+ mins 2 days opposite CR and increased METs in cardiac rehab    Current Aerobic Exercise Prescription:      Frequency: 3 days/week    Supplement with gym exercise 2+ days/wk as tolerated      Minutes: 45-50         METS: 3.9-5.3           HR:    RPE: 4-6         Modalities: UBE, Lifecycle, and Elliptical (no treadmill d/t back pain; UBE helps shoulder)     Exercise workloads will be progressed gradually as tolerated, within limits of patient's ability, and according to the patient's response to the exercise program.      30 Day Goals for Aerobic Exercise Progression:    Frequency: 3 days/week of cardiac rehab       Supplement with home exercise 2+ days/wk as tolerated    Minutes: 40 -45                           >150 mins/wk of moderate intensity exercise   METS: 4.5-6.0   HR: 106-125 bpm (50-70% HRR)     RPE: 4-6   Modalities: UBE, Lifecycle, Elliptical, and Rower    Strength trainin-3 days / week  12-15 repetitions  1-2 sets per modality    Modalities: Leg Press,  "Chest Press, Pull Downs, Arm Extension, Arm Curl, Front Raises, Shoulder Shrugs, Calf Raises, and Continue PT shoulder exercises at home    Home Exercise: Type: Elliptical and upright bike at PF, Frequency: 2 days/week, Duration: 20-30 mins - including stretching    Group and Individual Education: benefit of exercise for CAD risk factors, home exercise guidelines, AHA guidelines to achieve >150 mins/wk of moderate exercise, RPE scale, and Group class: Risk Factors for Heart Disease       Readiness to change: Action:  (Changing behavior)      NUTRITION ASSESSMENT AND PLAN    Weight control:    Starting weight: 246.4 lb   Current weight:   247 lb    Waist circumference:    Startin in   Current:      Diabetes: N/A  A1c: 5.8%    last measured: 24    Lipid management: Last lipid profile 3/28/24  Chol 107    HDL 32  LDL 36    SMART Goals:   TRG <150, reduced waist circumference <40 inches (M), and Improved Rate Your Plate score  >64    Patient Specific NUTRITION GOALS:  (wt control, diabetes management, dietary modifications): weight loss 220 lbs    Patient's progress toward SMART and personal goals: continuing with Noom for tracking and weight loss suggestions    Plan for next 30 days: increase daily intake of fruits and vegetables, eat more meatless meals, eat reduced-fat or part-skim cheese or rarely eat, use \"light\" tub margarine, choose low sugar desserts and sweets, and drink no more than 1-2 alcoholic drinks in a day    Group and Individual Education: heart healthy eating principles  low sodium diet  nutrition for  lipid management  group class: Heart Healthy Eating  group class:  Label Reading    Readiness to change: Action:  (Changing behavior)      PSYCHOSOCIAL ASSESSMENT AND PLAN    Emotional:  Depression assessment:  PHQ-9 = 1  1-4 = Minimal Depression            Anxiety measure:  KATERINA-7 = 1  0-4  = Not anxious    Assessment of depression and anxiety    Patient reports feelings of depression " /anxiety since his MI  Reports sufficient emotional support from family    Self-reported stress level:  3   Stress Management: Practice Relaxation Techniques and Exercise    Patient's rating of Social support: Very Good   Social Support Network: spouse, children, and grandchildren    Psychosocial Assessment as it relates to rehabilitation:   Patient denies issues with his/her family or home life that may affect their rehabilitation efforts.     SMART Goals:      Increased interest and pleasure in doing things and reduced time feeling nervous or on edge    Patient Specific PSYCHOCOSOCIAL GOALS:  (stress, emotional well being, social support):   reduced anxiety about health    Patient's progress toward SMART and personal goals: utilizing good support from family, pt denies feelings of depression or anxiety, feeling good and happy to be back at the gym.    Plan For next 30 days: Practice relaxation techniques, Exercise, Keep a positive mindset, and Enjoy family    Group and Individual Education: benefits of a positive support system, depression and CAD, and class:  Stress and Your Health     Readiness to change: Action:  (Changing behavior)      OTHER CORE COMPONENTS     Tobacco:   Social History     Tobacco Use   Smoking Status Never   Smokeless Tobacco Never       Tobacco Use Intervention:   N/A:  Patient is a non-smoker     Anginal Symptoms:   Arm pain   NTG use: Compliant with carrying NTG, Understands proper use, and Pt has not used NTG since event     Blood pressure:    Restin/78 - 118/68   Exercise: 140/78 - 174/76    SMART Goals: consistent, controlled resting BP < 130/80      Patient Specific CORE COMPONENT GOALS (smoking, BP control, angina control, medication): reduced dietary sodium <2000mg and medication compliance    Patient's progress toward SMART and personal goals: reduced dietary sodium, normal resting and exercise BP, 100% medication compliance    Plan For next 30 days: medication compliance,  avoid processed foods, engage in regular exercise, and monitor home BP    Group and Individual Education:  understanding high blood pressure and it's relationship to CAD, group class: Understanding Heart Disease, and group class:  Common Heart Medications    Readiness to change: Action:  (Changing behavior)

## 2024-05-23 ENCOUNTER — APPOINTMENT (EMERGENCY)
Dept: CT IMAGING | Facility: HOSPITAL | Age: 69
End: 2024-05-23
Payer: MEDICARE

## 2024-05-23 ENCOUNTER — HOSPITAL ENCOUNTER (EMERGENCY)
Facility: HOSPITAL | Age: 69
Discharge: HOME | End: 2024-05-24
Attending: EMERGENCY MEDICINE
Payer: MEDICARE

## 2024-05-23 DIAGNOSIS — K57.32 DIVERTICULITIS OF SIGMOID COLON: ICD-10-CM

## 2024-05-23 DIAGNOSIS — R10.30 LOWER ABDOMINAL PAIN: Primary | ICD-10-CM

## 2024-05-23 LAB
ALBUMIN SERPL BCP-MCNC: 4 G/DL (ref 3.5–5)
ALP SERPL-CCNC: 72 U/L (ref 34–104)
ALT SERPL W P-5'-P-CCNC: 20 U/L (ref 7–52)
ANION GAP SERPL CALCULATED.3IONS-SCNC: 8 MMOL/L (ref 4–13)
AST SERPL W P-5'-P-CCNC: 24 U/L (ref 13–39)
ATRIAL RATE: 65 BPM
BACTERIA UR QL AUTO: NORMAL /HPF
BASOPHILS # BLD AUTO: 0.05 THOUSANDS/ÂΜL (ref 0–0.1)
BASOPHILS NFR BLD AUTO: 0 % (ref 0–1)
BILIRUB SERPL-MCNC: 0.64 MG/DL (ref 0.2–1)
BILIRUB UR QL STRIP: NEGATIVE
BUN SERPL-MCNC: 17 MG/DL (ref 5–25)
CALCIUM SERPL-MCNC: 8.7 MG/DL (ref 8.4–10.2)
CARDIAC TROPONIN I PNL SERPL HS: 4 NG/L
CHLORIDE SERPL-SCNC: 103 MMOL/L (ref 96–108)
CLARITY UR: CLEAR
CO2 SERPL-SCNC: 26 MMOL/L (ref 21–32)
COLOR UR: ABNORMAL
CREAT SERPL-MCNC: 1.1 MG/DL (ref 0.6–1.3)
EOSINOPHIL # BLD AUTO: 0.17 THOUSAND/ÂΜL (ref 0–0.61)
EOSINOPHIL NFR BLD AUTO: 1 % (ref 0–6)
ERYTHROCYTE [DISTWIDTH] IN BLOOD BY AUTOMATED COUNT: 14.1 % (ref 11.6–15.1)
GFR SERPL CREATININE-BSD FRML MDRD: 68 ML/MIN/1.73SQ M
GLUCOSE SERPL-MCNC: 105 MG/DL (ref 65–140)
GLUCOSE UR STRIP-MCNC: NEGATIVE MG/DL
HCT VFR BLD AUTO: 42 % (ref 36.5–49.3)
HGB BLD-MCNC: 14.1 G/DL (ref 12–17)
HGB UR QL STRIP.AUTO: NEGATIVE
IMM GRANULOCYTES # BLD AUTO: 0.07 THOUSAND/UL (ref 0–0.2)
IMM GRANULOCYTES NFR BLD AUTO: 1 % (ref 0–2)
KETONES UR STRIP-MCNC: NEGATIVE MG/DL
LEUKOCYTE ESTERASE UR QL STRIP: ABNORMAL
LIPASE SERPL-CCNC: 19 U/L (ref 11–82)
LYMPHOCYTES # BLD AUTO: 3.14 THOUSANDS/ÂΜL (ref 0.6–4.47)
LYMPHOCYTES NFR BLD AUTO: 24 % (ref 14–44)
MCH RBC QN AUTO: 31.4 PG (ref 26.8–34.3)
MCHC RBC AUTO-ENTMCNC: 33.6 G/DL (ref 31.4–37.4)
MCV RBC AUTO: 94 FL (ref 82–98)
MONOCYTES # BLD AUTO: 1.09 THOUSAND/ÂΜL (ref 0.17–1.22)
MONOCYTES NFR BLD AUTO: 8 % (ref 4–12)
NEUTROPHILS # BLD AUTO: 8.79 THOUSANDS/ÂΜL (ref 1.85–7.62)
NEUTS SEG NFR BLD AUTO: 66 % (ref 43–75)
NITRITE UR QL STRIP: NEGATIVE
NON-SQ EPI CELLS URNS QL MICRO: NORMAL /HPF
NRBC BLD AUTO-RTO: 0 /100 WBCS
P AXIS: 43 DEGREES
PH UR STRIP.AUTO: 5.5 [PH]
PLATELET # BLD AUTO: 211 THOUSANDS/UL (ref 149–390)
PMV BLD AUTO: 10.6 FL (ref 8.9–12.7)
POTASSIUM SERPL-SCNC: 4.3 MMOL/L (ref 3.5–5.3)
PR INTERVAL: 182 MS
PROT SERPL-MCNC: 6.5 G/DL (ref 6.4–8.4)
PROT UR STRIP-MCNC: NEGATIVE MG/DL
QRS AXIS: -35 DEGREES
QRSD INTERVAL: 96 MS
QT INTERVAL: 416 MS
QTC INTERVAL: 432 MS
RBC # BLD AUTO: 4.49 MILLION/UL (ref 3.88–5.62)
RBC #/AREA URNS AUTO: NORMAL /HPF
SODIUM SERPL-SCNC: 137 MMOL/L (ref 135–147)
SP GR UR STRIP.AUTO: 1.02 (ref 1–1.03)
T WAVE AXIS: -26 DEGREES
UROBILINOGEN UR STRIP-ACNC: <2 MG/DL
VENTRICULAR RATE: 65 BPM
WBC # BLD AUTO: 13.31 THOUSAND/UL (ref 4.31–10.16)
WBC #/AREA URNS AUTO: NORMAL /HPF

## 2024-05-23 PROCEDURE — 84484 ASSAY OF TROPONIN QUANT: CPT

## 2024-05-23 PROCEDURE — 96360 HYDRATION IV INFUSION INIT: CPT

## 2024-05-23 PROCEDURE — 93005 ELECTROCARDIOGRAM TRACING: CPT

## 2024-05-23 PROCEDURE — 83690 ASSAY OF LIPASE: CPT

## 2024-05-23 PROCEDURE — 96361 HYDRATE IV INFUSION ADD-ON: CPT

## 2024-05-23 PROCEDURE — 99285 EMERGENCY DEPT VISIT HI MDM: CPT

## 2024-05-23 PROCEDURE — 81001 URINALYSIS AUTO W/SCOPE: CPT

## 2024-05-23 PROCEDURE — 99284 EMERGENCY DEPT VISIT MOD MDM: CPT

## 2024-05-23 PROCEDURE — 93010 ELECTROCARDIOGRAM REPORT: CPT

## 2024-05-23 PROCEDURE — 85025 COMPLETE CBC W/AUTO DIFF WBC: CPT

## 2024-05-23 PROCEDURE — 74177 CT ABD & PELVIS W/CONTRAST: CPT

## 2024-05-23 PROCEDURE — 80053 COMPREHEN METABOLIC PANEL: CPT

## 2024-05-23 PROCEDURE — 36415 COLL VENOUS BLD VENIPUNCTURE: CPT

## 2024-05-23 RX ADMIN — SODIUM CHLORIDE 1000 ML: 0.9 INJECTION, SOLUTION INTRAVENOUS at 22:03

## 2024-05-24 ENCOUNTER — APPOINTMENT (OUTPATIENT)
Dept: CARDIAC REHAB | Facility: CLINIC | Age: 69
End: 2024-05-24
Payer: MEDICARE

## 2024-05-24 VITALS
SYSTOLIC BLOOD PRESSURE: 130 MMHG | BODY MASS INDEX: 32.58 KG/M2 | WEIGHT: 246.91 LBS | HEART RATE: 63 BPM | DIASTOLIC BLOOD PRESSURE: 66 MMHG | RESPIRATION RATE: 17 BRPM | OXYGEN SATURATION: 98 % | TEMPERATURE: 98 F

## 2024-05-24 RX ORDER — ACETAMINOPHEN 325 MG/1
975 TABLET ORAL ONCE
Status: COMPLETED | OUTPATIENT
Start: 2024-05-24 | End: 2024-05-24

## 2024-05-24 RX ORDER — AMOXICILLIN AND CLAVULANATE POTASSIUM 875; 125 MG/1; MG/1
1 TABLET, FILM COATED ORAL EVERY 12 HOURS
Qty: 14 TABLET | Refills: 0 | Status: SHIPPED | OUTPATIENT
Start: 2024-05-24 | End: 2024-05-31

## 2024-05-24 RX ADMIN — ACETAMINOPHEN 975 MG: 325 TABLET, FILM COATED ORAL at 00:32

## 2024-05-24 RX ADMIN — IOHEXOL 100 ML: 350 INJECTION, SOLUTION INTRAVENOUS at 00:02

## 2024-05-24 NOTE — ED PROVIDER NOTES
History  Chief Complaint   Patient presents with    Abdominal Pain     States lower abd pain that radiates to R side that started yesterday. States worse today. Denies N/V/D.      This is a 69-year-old male with a medical history significant for MI, currently in cardiac rehab who presents to the ED for evaluation of lower abdominal pain X 2 days.  Patient reports bilateral lower abdominal pain, slightly worse on the right side, describes the pain as a constant ache, nothing makes it better or worse.  States pain is otherwise nonradiating, denies any radiation into his back or into his chest.  He denies any associated nausea or vomiting.  He denies any diarrhea or stool changes.  He denies any recent fevers, headaches, lightheadedness, chest pain, SOB, denies any hematuria/burning/increased urinary frequency, denies any scrotal or testicular pain.        Prior to Admission Medications   Prescriptions Last Dose Informant Patient Reported? Taking?   Boswellia-Glucosamine-Vit D (Osteo Bi-Flex One Per Day) TABS   Yes No   Sig: Take by mouth   Ergocalciferol (VITAMIN D2 PO)   Yes No   Sig: Take by mouth   Patient not taking: Reported on 4/2/2024   Glucosamine 750 MG TABS   Yes No   Sig: Take 2 tablets by mouth daily   Patient not taking: Reported on 4/2/2024   Multiple Vitamins-Minerals (MENS MULTIVITAMIN PO)   Yes No   Omega-3 Fatty Acids (Fish Oil Omega-3) 1000 MG CAPS   Yes No   Sig: Take 2 g by mouth daily   allopurinol (ZYLOPRIM) 100 mg tablet   Yes No   Sig: Take 100 mg by mouth daily   aspirin 81 mg chewable tablet   No No   Sig: Chew 1 tablet (81 mg total) daily   atorvastatin (LIPITOR) 80 mg tablet   No No   Sig: Take 1 tablet (80 mg total) by mouth daily with dinner   colchicine (COLCRYS) 0.6 mg tablet   Yes No   colchicine (COLCRYS) 0.6 mg tablet   No No   Sig: One tid prn gout   losartan (COZAAR) 25 mg tablet   No No   Sig: Take 1 tablet (25 mg total) by mouth daily   metoprolol tartrate (LOPRESSOR) 25 mg  tablet   No No   Sig: Take 0.5 tablets (12.5 mg total) by mouth every 12 (twelve) hours   nitroglycerin (NITROSTAT) 0.4 mg SL tablet   No No   Sig: Place 1 tablet (0.4 mg total) under the tongue every 5 (five) minutes as needed for chest pain   ticagrelor (BRILINTA) 90 MG   No No   Sig: Take 1 tablet (90 mg total) by mouth every 12 (twelve) hours      Facility-Administered Medications: None       Past Medical History:   Diagnosis Date    Erectile dysfunction     Knee pain, right     Renal calculi        Past Surgical History:   Procedure Laterality Date    CARDIAC CATHETERIZATION N/A 2/19/2024    Procedure: Cardiac pci;  Surgeon: Tl Dominique MD;  Location: [facility];  Service: Cardiology       History reviewed. No pertinent family history.  I have reviewed and agree with the history as documented.    E-Cigarette/Vaping    E-Cigarette Use Never User      E-Cigarette/Vaping Substances     Social History     Tobacco Use    Smoking status: Never    Smokeless tobacco: Never   Vaping Use    Vaping status: Never Used   Substance Use Topics    Alcohol use: Yes     Comment: social drinker    Drug use: Never       Review of Systems   Constitutional:  Negative for chills and fever.   Eyes:  Negative for photophobia and visual disturbance.   Respiratory:  Negative for cough and shortness of breath.    Cardiovascular:  Negative for chest pain and palpitations.   Gastrointestinal:  Positive for abdominal pain. Negative for blood in stool, diarrhea, nausea and vomiting.   Genitourinary:  Negative for difficulty urinating, dysuria, flank pain and hematuria.   Musculoskeletal:  Negative for neck pain and neck stiffness.   Neurological:  Negative for dizziness, syncope, light-headedness and headaches.       Physical Exam  Physical Exam  Vitals and nursing note reviewed.   Constitutional:       General: He is not in acute distress.     Appearance: He is well-developed.   HENT:      Head: Normocephalic and atraumatic.    Eyes:      Conjunctiva/sclera: Conjunctivae normal.   Cardiovascular:      Rate and Rhythm: Normal rate and regular rhythm.      Heart sounds: No murmur heard.  Pulmonary:      Effort: Pulmonary effort is normal. No respiratory distress.      Breath sounds: Normal breath sounds.   Abdominal:      General: There is no distension.      Palpations: Abdomen is soft.      Tenderness: There is abdominal tenderness in the right lower quadrant, suprapubic area and left lower quadrant. There is no right CVA tenderness or left CVA tenderness.   Musculoskeletal:         General: No swelling.      Cervical back: Neck supple.   Skin:     General: Skin is warm and dry.      Capillary Refill: Capillary refill takes less than 2 seconds.   Neurological:      General: No focal deficit present.      Mental Status: He is alert and oriented to person, place, and time.   Psychiatric:         Mood and Affect: Mood normal.         Vital Signs  ED Triage Vitals [05/23/24 2111]   Temperature Pulse Respirations Blood Pressure SpO2   98 °F (36.7 °C) 61 18 130/87 97 %      Temp Source Heart Rate Source Patient Position - Orthostatic VS BP Location FiO2 (%)   Oral Monitor Sitting Right arm --      Pain Score       7           Vitals:    05/23/24 2330 05/23/24 2343 05/24/24 0045 05/24/24 0100   BP: 128/75 127/68 121/76 130/66   Pulse: 63 63 64 63   Patient Position - Orthostatic VS:  Lying           Visual Acuity      ED Medications  Medications   sodium chloride 0.9 % bolus 1,000 mL (0 mL Intravenous Stopped 5/24/24 0033)   iohexol (OMNIPAQUE) 350 MG/ML injection (MULTI-DOSE) 100 mL (100 mL Intravenous Given 5/24/24 0002)   acetaminophen (TYLENOL) tablet 975 mg (975 mg Oral Given 5/24/24 0032)       Diagnostic Studies  Results Reviewed       Procedure Component Value Units Date/Time    Comprehensive metabolic panel [[id number]] Collected: 05/23/24 2202    Lab Status: Final result Specimen: Blood from Arm, Right Updated: 05/23/24 2317     Sodium  137 mmol/L      Potassium 4.3 mmol/L      Chloride 103 mmol/L      CO2 26 mmol/L      ANION GAP 8 mmol/L      BUN 17 mg/dL      Creatinine 1.10 mg/dL      Glucose 105 mg/dL      Calcium 8.7 mg/dL      AST 24 U/L      ALT 20 U/L      Alkaline Phosphatase 72 U/L      Total Protein 6.5 g/dL      Albumin 4.0 g/dL      Total Bilirubin 0.64 mg/dL      eGFR 68 ml/min/1.73sq m     Narrative:      National Kid[employer] Foundation guidelines for Chronic Kidney Disease (CKD):     Stage 1 with normal or high GFR (GFR > 90 mL/min/1.73 square meters)    Stage 2 Mild CKD (GFR = 60-89 mL/min/1.73 square meters)    Stage 3A Moderate CKD (GFR = 45-59 mL/min/1.73 square meters)    Stage 3B Moderate CKD (GFR = 30-44 mL/min/1.73 square meters)    Stage 4 Severe CKD (GFR = 15-29 mL/min/1.73 square meters)    Stage 5 End Stage CKD (GFR <15 mL/min/1.73 square meters)  Note: GFR calculation is accurate only with a steady state creatinine    Lipase [[id number]]  (Normal) Collected: 05/23/24 2202    Lab Status: Final result Specimen: Blood from Arm, Right Updated: 05/23/24 2317     Lipase 19 u/L     Urine Microscopic [[id number]]  (Normal) Collected: 05/23/24 2254    Lab Status: Final result Specimen: Urine, Clean Catch Updated: 05/23/24 2303     RBC, UA None Seen /hpf      WBC, UA 1-2 /hpf      Epithelial Cells None Seen /hpf      Bacteria, UA None Seen /hpf     UA w Reflex to Microscopic w Reflex to Culture [[id number]]  (Abnormal) Collected: 05/23/24 2254    Lab Status: Final result Specimen: Urine, Clean Catch Updated: 05/23/24 2303     Color, UA Light Yellow     Clarity, UA Clear     Specific Gravity, UA 1.019     pH, UA 5.5     Leukocytes, UA Trace     Nitrite, UA Negative     Protein, UA Negative mg/dl      Glucose, UA Negative mg/dl      Ketones, UA Negative mg/dl      Urobilinogen, UA <2.0 mg/dl      Bilirubin, UA Negative     Occult Blood, UA Negative    HS Troponin 0hr (reflex protocol) [[id number]]  (Normal) Collected: 05/23/24 2202     Lab Status: Final result Specimen: Blood from Arm, Right Updated: 05/23/24 2246     hs TnI 0hr 4 ng/L     CBC and differential [[id number]]  (Abnormal) Collected: 05/23/24 2202    Lab Status: Final result Specimen: Blood from Arm, Right Updated: 05/23/24 2217     WBC 13.31 Thousand/uL      RBC 4.49 Million/uL      Hemoglobin 14.1 g/dL      Hematocrit 42.0 %      MCV 94 fL      MCH 31.4 pg      MCHC 33.6 g/dL      RDW 14.1 %      MPV 10.6 fL      Platelets 211 Thousands/uL      nRBC 0 /100 WBCs      Segmented % 66 %      Immature Grans % 1 %      Lymphocytes % 24 %      Monocytes % 8 %      Eosinophils Relative 1 %      Basophils Relative 0 %      Absolute Neutrophils 8.79 Thousands/µL      Absolute Immature Grans 0.07 Thousand/uL      Absolute Lymphocytes 3.14 Thousands/µL      Absolute Monocytes 1.09 Thousand/µL      Eosinophils Absolute 0.17 Thousand/µL      Basophils Absolute 0.05 Thousands/µL                    CT abdomen pelvis with contrast   ED Interpretation by Marita Thornton PA-C (05/24 5018)   [facility]    FINDINGS:   Liver: Liver is unremarkable. No suspicious mass.   Gallbladder and bile ducts: Multiple calculi within the gallbladder. No pericholecystic fluid. No biliary ductal dilatation.   Pancreas: Pancreas is unremarkable.   Spleen: Spleen appears normal.   Adrenal glands: Adrenal glands are normal.   Kidneys and ureters: Right kidney nonobstructing calcification. There is no hydronephrosis on either side.   Stomach and bowel: There is stranding and haziness adjacent to a thick-walled and diverticulabearing sigmoid colon. Findings represent diverticulitis. There is no associated intestinal obstruction.   Appendix: The appendix is normal.   Intraperitoneal space: Unremarkable. No free air. No significant fluid collection.   Vasculature: Unremarkable. No abdominal aortic aneurysm.   Lymph nodes: No enlarged lymph nodes.   Urinary bladder: Unremarkable, as visualized.   Reproductive: Unremarkable, as  visualized.   Bones/joints: There is bilateral L5 spondylolysis and mild spo   ndylolisthesis.   Soft tissues: Unremarkable.     IMPRESSION: 1. Acute sigmoid diverticulitis. 2. Cholelithiasis.        Final Result by Stewart Chase MD (05/24 0733)      Acute uncomplicated sigmoid diverticulitis.      Cholelithiasis.      Right nephrolithiasis.      Finding (s) were preliminarily reported by [facility] service at the time of examination.               Workstation performed: [id number]                    Procedures  ECG 12 Lead Documentation Only    Date/Time: 5/23/2024 10:21 PM    Performed by: Ian Hanna PA-C  Authorized by: Ian Hanna PA-C    Rate:     ECG rate:  65    ECG rate assessment: normal    Rhythm:     Rhythm: sinus rhythm    Ectopy:     Ectopy: none    QRS:     QRS axis:  Left    QRS intervals:  Normal  Conduction:     Conduction: normal    ST segments:     ST segments:  Normal           ED Course                               SBIRT 20yo+      Flowsheet Row Most Recent Value   Initial Alcohol Screen: US AUDIT-C     1. How often do you have a drink containing alcohol? 0 Filed at: 05/24/2024 0118   2. How many drinks containing alcohol do you have on a typical day you are drinking?  0 Filed at: 05/24/2024 0118   3b. FEMALE Any Age, or MALE 65+: How often do you have 4 or more drinks on one occassion? 0 Filed at: 05/24/2024 0118   Audit-C Score 0 Filed at: 05/24/2024 0118   TRISTAN: How many times in the past year have you...    Used an illegal drug or used a prescription medication for non-medical reasons? Never Filed at: 05/24/2024 0118                      Medical Decision Making  69-year-old male presents the ED for evaluation of lower abdominal pain x 2 days.  Afebrile, denies NV, well-appearing on exam.  Plan for basic labs, UA, CT abdomen and pelvis.  Given patient's cardiac history will order EKG and troponin to rule out cardiac cause.  Does not want anything for  pain.    Amount and/or Complexity of Data Reviewed  Labs: ordered.  Radiology: ordered and independent interpretation performed.    Risk  OTC drugs.  Prescription drug management.             Disposition  Final diagnoses:   Lower abdominal pain   Diverticulitis of sigmoid colon     Time reflects when diagnosis was documented in both MDM as applicable and the Disposition within this note       Time User Action Codes Description Comment    5/24/2024  1:05 AM Ian Hanna Add [R10.30] Lower abdominal pain     5/24/2024  2:27 AM Marita Thornton Add [K57.32] Diverticulitis of sigmoid colon           ED Disposition       ED Disposition   Discharge    Condition   Stable    Date/Time   Fri May 24, 2024 0205    Comment   Jerome Coffey discharge to home/self care.                   Follow-up Information       Follow up With Specialties Details Why Contact Info    Jerome Wiggins, DO Family Medicine Schedule an appointment as soon as possible for a visit  For re-check [address] [facility]  [address]   [place] [address]  [phone]              Discharge Medication List as of 5/24/2024  2:06 AM        CONTINUE these medications which have NOT CHANGED    Details   allopurinol (ZYLOPRIM) 100 mg tablet Take 100 mg by mouth daily, Historical Med      aspirin 81 mg chewable tablet Chew 1 tablet (81 mg total) daily, Starting Wed 2/21/2024, Until Sun 4/21/2024, Normal      atorvastatin (LIPITOR) 80 mg tablet Take 1 tablet (80 mg total) by mouth daily with dinner, Starting Tue 4/2/2024, Normal      Boswellia-Glucosamine-Vit D (Osteo Bi-Flex One Per Day) TABS Take by mouth, Historical Med      !! colchicine (COLCRYS) 0.6 mg tablet Historical Med      !! colchicine (COLCRYS) 0.6 mg tablet One tid prn gout, Print      Ergocalciferol (VITAMIN D2 PO) Take by mouth, Historical Med      Glucosamine 750 MG TABS Take 2 tablets by mouth daily, Historical Med      losartan (COZAAR) 25 mg tablet Take 1 tablet (25 mg total) by mouth daily,  Starting Tue 4/2/2024, Normal      metoprolol tartrate (LOPRESSOR) 25 mg tablet Take 0.5 tablets (12.5 mg total) by mouth every 12 (twelve) hours, Starting Tue 4/2/2024, Normal      Multiple Vitamins-Minerals (MENS MULTIVITAMIN PO) Historical Med      nitroglycerin (NITROSTAT) 0.4 mg SL tablet Place 1 tablet (0.4 mg total) under the tongue every 5 (five) minutes as needed for chest pain, Starting Tue 2/20/2024, Until u 3/21/2024 at 2359, Normal      Omega-3 Fatty Acids (Fish Oil Omega-3) 1000 MG CAPS Take 2 g by mouth daily, Historical Med      ticagrelor (BRILINTA) 90 MG Take 1 tablet (90 mg total) by mouth every 12 (twelve) hours, Starting Sat 5/11/2024, Normal       !! - Potential duplicate medications found. Please discuss with provider.          No discharge procedures on file.    PDMP Review       None            ED Provider  Electronically Signed by             Ian Hanna PA-C  05/25/24 0959

## 2024-05-24 NOTE — ED CARE HANDOFF
Emergency Department Sign Out Note        Sign out and transfer of care from Ian Hanna PA-C. See Separate Emergency Department note.     The patient, Jerome Coffey, was evaluated by the previous provider for Abdominal pain.    Workup Completed:  Results Reviewed       Procedure Component Value Units Date/Time    Comprehensive metabolic panel [[id number]] Collected: 05/23/24 2202    Lab Status: Final result Specimen: Blood from Arm, Right Updated: 05/23/24 2317     Sodium 137 mmol/L      Potassium 4.3 mmol/L      Chloride 103 mmol/L      CO2 26 mmol/L      ANION GAP 8 mmol/L      BUN 17 mg/dL      Creatinine 1.10 mg/dL      Glucose 105 mg/dL      Calcium 8.7 mg/dL      AST 24 U/L      ALT 20 U/L      Alkaline Phosphatase 72 U/L      Total Protein 6.5 g/dL      Albumin 4.0 g/dL      Total Bilirubin 0.64 mg/dL      eGFR 68 ml/min/1.73sq m     Narrative:      National Kidney Disease Foundation guidelines for Chronic Kidney Disease (CKD):     Stage 1 with normal or high GFR (GFR > 90 mL/min/1.73 square meters)    Stage 2 Mild CKD (GFR = 60-89 mL/min/1.73 square meters)    Stage 3A Moderate CKD (GFR = 45-59 mL/min/1.73 square meters)    Stage 3B Moderate CKD (GFR = 30-44 mL/min/1.73 square meters)    Stage 4 Severe CKD (GFR = 15-29 mL/min/1.73 square meters)    Stage 5 End Stage CKD (GFR <15 mL/min/1.73 square meters)  Note: GFR calculation is accurate only with a steady state creatinine    Lipase [[id number]]  (Normal) Collected: 05/23/24 2202    Lab Status: Final result Specimen: Blood from Arm, Right Updated: 05/23/24 2317     Lipase 19 u/L     Urine Microscopic [[id number]]  (Normal) Collected: 05/23/24 2254    Lab Status: Final result Specimen: Urine, Clean Catch Updated: 05/23/24 2303     RBC, UA None Seen /hpf      WBC, UA 1-2 /hpf      Epithelial Cells None Seen /hpf      Bacteria, UA None Seen /hpf     UA w Reflex to Microscopic w Reflex to Culture [[id number]]  (Abnormal) Collected: 05/23/24 2254    Lab Status:  Final result Specimen: Urine, Clean Catch Updated: 05/23/24 2303     Color, UA Light Yellow     Clarity, UA Clear     Specific Gravity, UA 1.019     pH, UA 5.5     Leukocytes, UA Trace     Nitrite, UA Negative     Protein, UA Negative mg/dl      Glucose, UA Negative mg/dl      Ketones, UA Negative mg/dl      Urobilinogen, UA <2.0 mg/dl      Bilirubin, UA Negative     Occult Blood, UA Negative    HS Troponin 0hr (reflex protocol) [[id number]]  (Normal) Collected: 05/23/24 2202    Lab Status: Final result Specimen: Blood from Arm, Right Updated: 05/23/24 2246     hs TnI 0hr 4 ng/L     CBC and differential [[id number]]  (Abnormal) Collected: 05/23/24 2202    Lab Status: Final result Specimen: Blood from Arm, Right Updated: 05/23/24 2217     WBC 13.31 Thousand/uL      RBC 4.49 Million/uL      Hemoglobin 14.1 g/dL      Hematocrit 42.0 %      MCV 94 fL      MCH 31.4 pg      MCHC 33.6 g/dL      RDW 14.1 %      MPV 10.6 fL      Platelets 211 Thousands/uL      nRBC 0 /100 WBCs      Segmented % 66 %      Immature Grans % 1 %      Lymphocytes % 24 %      Monocytes % 8 %      Eosinophils Relative 1 %      Basophils Relative 0 %      Absolute Neutrophils 8.79 Thousands/µL      Absolute Immature Grans 0.07 Thousand/uL      Absolute Lymphocytes 3.14 Thousands/µL      Absolute Monocytes 1.09 Thousand/µL      Eosinophils Absolute 0.17 Thousand/µL      Basophils Absolute 0.05 Thousands/µL               ED Course / Workup Pending (followup):  CT abdomen pelvis with contrast   ED Interpretation by Marita Thornton PA-C (05/24 0225)   [facility]    FINDINGS:   Liver: Liver is unremarkable. No suspicious mass.   Gallbladder and bile ducts: Multiple calculi within the gallbladder. No pericholecystic fluid. No biliary ductal dilatation.   Pancreas: Pancreas is unremarkable.   Spleen: Spleen appears normal.   Adrenal glands: Adrenal glands are normal.   Kidneys and ureters: Right kidney nonobstructing calcification. There is no hydronephrosis on  either side.   Stomach and bowel: There is stranding and haziness adjacent to a thick-walled and diverticulabearing sigmoid colon. Findings represent diverticulitis. There is no associated intestinal obstruction.   Appendix: The appendix is normal.   Intraperitoneal space: Unremarkable. No free air. No significant fluid collection.   Vasculature: Unremarkable. No abdominal aortic aneurysm.   Lymph nodes: No enlarged lymph nodes.   Urinary bladder: Unremarkable, as visualized.   Reproductive: Unremarkable, as visualized.   Bones/joints: There is bilateral L5 spondylolysis and mild spo   ndylolisthesis.   Soft tissues: Unremarkable.     IMPRESSION: 1. Acute sigmoid diverticulitis. 2. Cholelithiasis.          [facility]:    FINDINGS: Liver: Liver is unremarkable. No suspicious mass. Gallbladder and bile ducts: Multiple calculi within the gallbladder. No pericholecystic fluid. No biliary ductal dilatation. Pancreas: Pancreas is unremarkable. Spleen: Spleen appears normal. Adrenal glands: Adrenal glands are normal. Kidneys and ureters: Right kidney nonobstructing calcification. There is no hydronephrosis on either side. Stomach and bowel: There is stranding and haziness adjacent to a thick-walled and diverticulabearing sigmoid colon. Findings represent diverticulitis. There is no associated intestinal obstruction. Appendix: The appendix is normal. Intraperitoneal space: Unremarkable. No free air. No significant fluid collection. Vasculature: Unremarkable. No abdominal aortic aneurysm. Lymph nodes: No enlarged lymph nodes. Urinary bladder: Unremarkable, as visualized. Reproductive: Unremarkable, as visualized. Bones/joints: There is bilateral L5 spondylolysis and mild spondylolisthesis. Soft tissues: Unremarkable.     IMPRESSION: 1. Acute sigmoid diverticulitis. 2. Cholelithiasis.                                  ED Course as of 05/24/24 0241   Fri May 24, 2024   0106 S/o from [facility] Dispo pending CT.    0207 Pt did not want  to wait for CT results.  Discussed with patient risks.  Patient understands risk, is comfortable discharge at this time.  Patient states he feels improved. Will contact with emergent results.     Procedures  Medical Decision Making      Patient was seen by previous provider for lower abdominal pain.  CT suggestive of diverticulitis, unsure if per for abscess.  Discussed with the patient.  Patient would like to leave prior to imaging being read.  Patient states symptoms improved.  Discussed risks of discharge prior to imaging results, patient verbalized understanding is comfortable discharge.  If CT is positive for diverticulitis, will send antibiotics to the pharmacy.  If any significant CT findings, will contact patient this evening.    Prior to discharge, discharge instructions were discussed with patient at bedside. Patient was provided both verbal and written instructions. Patient is understanding of the discharge instructions and is agreeable to plan of care. Return precautions were discussed with patient bedside, patient verbalized understanding of signs and symptoms that would necessitate return to the ED. All questions were answered. Patient was comfortable with the plan of care and discharged to home.     Dispo: discharge home with follow up to PCP. Patient stable, in no acute distress and non-toxic at discharge.    Amount and/or Complexity of Data Reviewed  Labs: ordered.  Radiology: ordered and independent interpretation performed.    Risk  OTC drugs.  Prescription drug management.            Disposition  Final diagnoses:   Lower abdominal pain   Diverticulitis of sigmoid colon     Time reflects when diagnosis was documented in both MDM as applicable and the Disposition within this note       Time User Action Codes Description Comment    5/24/2024  1:05 AM Ian Hanna Add [R10.30] Lower abdominal pain     5/24/2024  2:27 AM Marita Thornton Add [K57.32] Diverticulitis of sigmoid colon           ED  Disposition       ED Disposition   Discharge    Condition   Stable    Date/Time   Fri May 24, 2024  2:05 AM    Comment   Jerome Coffey discharge to home/self care.                   Follow-up Information       Follow up With Specialties Details Why Contact Info    Jerome Wiggins DO Family Medicine Schedule an appointment as soon as possible for a visit  For re-check [address] [facility]  [address]   [place] [address]  [phone]            Discharge Medication List as of 5/24/2024  2:06 AM        CONTINUE these medications which have NOT CHANGED    Details   allopurinol (ZYLOPRIM) 100 mg tablet Take 100 mg by mouth daily, Historical Med      aspirin 81 mg chewable tablet Chew 1 tablet (81 mg total) daily, Starting Wed 2/21/2024, Until Sun 4/21/2024, Normal      atorvastatin (LIPITOR) 80 mg tablet Take 1 tablet (80 mg total) by mouth daily with dinner, Starting Tue 4/2/2024, Normal      Boswellia-Glucosamine-Vit D (Osteo Bi-Flex One Per Day) TABS Take by mouth, Historical Med      !! colchicine (COLCRYS) 0.6 mg tablet Historical Med      !! colchicine (COLCRYS) 0.6 mg tablet One tid prn gout, Print      Ergocalciferol (VITAMIN D2 PO) Take by mouth, Historical Med      Glucosamine 750 MG TABS Take 2 tablets by mouth daily, Historical Med      losartan (COZAAR) 25 mg tablet Take 1 tablet (25 mg total) by mouth daily, Starting Tue 4/2/2024, Normal      metoprolol tartrate (LOPRESSOR) 25 mg tablet Take 0.5 tablets (12.5 mg total) by mouth every 12 (twelve) hours, Starting Tue 4/2/2024, Normal      Multiple Vitamins-Minerals (MENS MULTIVITAMIN PO) Historical Med      nitroglycerin (NITROSTAT) 0.4 mg SL tablet Place 1 tablet (0.4 mg total) under the tongue every 5 (five) minutes as needed for chest pain, Starting Tue 2/20/2024, Until Thu 3/21/2024 at 2359, Normal      Omega-3 Fatty Acids (Fish Oil Omega-3) 1000 MG CAPS Take 2 g by mouth daily, Historical Med      ticagrelor (BRILINTA) 90 MG Take 1 tablet (90 mg total)  by mouth every 12 (twelve) hours, Starting Sat 5/11/2024, Normal       !! - Potential duplicate medications found. Please discuss with provider.        No discharge procedures on file.       ED Provider  Electronically Signed by ALEXI aDvis PA-C  05/24/24 2741

## 2024-05-29 ENCOUNTER — CLINICAL SUPPORT (OUTPATIENT)
Dept: CARDIAC REHAB | Facility: CLINIC | Age: 69
End: 2024-05-29
Payer: MEDICARE

## 2024-05-29 DIAGNOSIS — I21.3 ST ELEVATION MYOCARDIAL INFARCTION (STEMI), UNSPECIFIED ARTERY (HCC): Primary | ICD-10-CM

## 2024-05-29 PROCEDURE — 93798 PHYS/QHP OP CAR RHAB W/ECG: CPT

## 2024-05-30 NOTE — PROGRESS NOTES
Cardiac Rehabilitation Plan of Care   DISCHARGE          Today's date: 2024   # of Exercise Sessions Completed: 33  Patient name: Jerome Coffey      : 1955  Age: 69 y.o.       MRN: [id number]  Referring Physician: Luz Maria Coleman*  Cardiologist: Reji Baum MD    Provider: [place]  Clinician: Radha Almodovar MS, CEP    Dx:   Encounter Diagnosis   Name Primary?    ST elevation myocardial infarction (STEMI), unspecified artery (HCC) Yes           Description of Diagnosis:  BEAN to Mid RCA lesion which was 100% stenosed    Date of onset: 24    SUMMARY OF PROGRESS: Discharge note for Jerome. Jerome completed a final submax ETT on the the elliptical, reaching a max of 6.2 METs compared to 7.5 METs initially on the treadmill. Jerome has not been completing the treadmill due to back pain so his final test was adjusted and completed on the elliptical instead. His exercise tolerance (max METs in tolerated in cardiac rehab) increased by 27.5%.  He had a 18.7% improvement in the DUKE activity estimated MET level with ADLs and physical activity. His [facility] QOL did not improve, with a decline of 15.8%. PHQ-9 score increased from 1 to 4. KATERINA-7 score did not change with a score of 1. His weight decreased by 1 pounds. He hopes to lose some weight using Noom, which he has in the past. Rate Your Plate score improved from 60 to 68.  Jerome tolerates 40-50 mins at 4.0-5.2 METs plus wt training. NSR on telemetry. RHR 54-70, ExHR . Resting //86 with appropriate response to exercise reaching 140/78 -168/78. The patient attended group educational classes on risk factor reduction and healthy eating. Discharge plans include continuing his exercise at the gym 2-3 days/week using the elliptical and bike plus weight training and stretching.  Encouraged the patient  to continue a disciplined exercise regimen: Frequency: 4-6 days/wk, Intensity: RPE 4-5, Time: 40-50 mins daily, 150-200 mins/wk. The  patient was encouraged to remain compliant with medications and follow up with cardiologist with any cardiac symptoms and medication management.     Medication compliance: Yes   Comments: Pt reports to be compliant with medications    Fall Risk: Low   Comments: Ambulates with a steady gait with no assist device and Denies a fall in the past 6 months      EXERCISE ASSESSMENT and PLAN    ECG Interpretation: NSR    SMART Goals:   10% improvement in functional capacity based on max METs achieved in initial fitness assessment  improved DASI score by 10%  increased exercise capacity by 40% based on peak METs tolerated in cardiac rehab exercise session  maintain > 150 minutes per week of moderate intensity exercise    Patient Specific EXERCISE GOALS:   (home exercise, ADLs, recreation):   return to gym with increased confidence - focusing on aerobic exercise, minimizing muscular hypertrophy    Patient's progress toward SMART and personal goals: completed 33 sessions of cardiac rehab with increased MET level tolerated; returned to the gym 2-3 days/week and plans to continue post discharge.     Plan For next 30 days: After discharge patient will continue to follow a regular exercise regimen with the goal of a minimum of 150 minutes per week of moderate intensity exercise.      Current Aerobic Exercise Prescription:      Frequency: 3 days/week    Supplement with gym exercise 2+ days/wk as tolerated      Minutes: 45-50         METS: 4.0-5.2          HR:    RPE: 4-6         Modalities: UBE, Lifecycle, and Elliptical (no treadmill d/t back pain; UBE helps shoulder)     Exercise workloads will be progressed gradually as tolerated, within limits of patient's ability, and according to the patient's response to the exercise program.      Exercise Progression after Discharge:    Frequency: 3-5 days of gym or home exercise   Minutes: 30-50  >150 mins/wk of moderate intensity exercise   METS: 4.0 - 5.0   HR: 80 - 110    RPE:  "4-6   Modalities: Lifecycle, Elliptical, and weights and stretching      Strength trainin-3 days / week  12-15 repetitions  1-2 sets per modality    Modalities: Leg Press, Chest Press, Pull Downs, Arm Extension, Arm Curl, Front Raises, Shoulder Shrugs, Calf Raises, and Continue PT shoulder exercises at home    Home Exercise: Type: Elliptical and upright bike at PF, Frequency: 2 days/week, Duration: 20-30 mins - including stretching    Group and Individual Education: benefit of exercise for CAD risk factors, home exercise guidelines, AHA guidelines to achieve >150 mins/wk of moderate exercise, RPE scale, Group class: Risk Factors for Heart Disease, and exercise instructions/guidelines for discharge        Readiness to change: Maintenance: (Maintaining the behavior change)      NUTRITION ASSESSMENT AND PLAN    Weight control:    Starting weight: 246.4 lb   Current weight: 245.2 lb    Waist circumference:    Startin in   Current: 43.5 in    Diabetes: N/A  A1c: 5.8%    last measured: 24    Lipid management: Last lipid profile 3/28/24  Chol 107    HDL 32  LDL 36    SMART Goals:   TRG <150, reduced waist circumference <40 inches (M), and Improved Rate Your Plate score  >64    Patient Specific NUTRITION GOALS:  (wt control, diabetes management, dietary modifications): weight loss 220 lbs    Patient's progress toward SMART and personal goals: continuing with Noom for tracking and weight loss suggestions; no significant weight loss noted    Plan for next 30 days: increase daily intake of fruits and vegetables, eat more meatless meals, eat reduced-fat or part-skim cheese or rarely eat, use \"light\" tub margarine, choose low sugar desserts and sweets, and drink no more than 1-2 alcoholic drinks in a day    Group and Individual Education: heart healthy eating principles  low sodium diet  nutrition for  lipid management  group class: Heart Healthy Eating  group class:  Label Reading    Readiness to change: " Action:  (Changing behavior)      PSYCHOSOCIAL ASSESSMENT AND PLAN    Emotional:  Depression assessment:  PHQ-9 = 4 1-4 = Minimal Depression            Anxiety measure:  KATERINA-7 = 1  0-4  = Not anxious    Assessment of depression and anxiety    Patient reports feelings of depression /anxiety since his MI  Reports sufficient emotional support from family    Self-reported stress level:  3   Stress Management: Practice Relaxation Techniques and Exercise    Patient's rating of Social support: Very Good   Social Support Network: spouse, children, and grandchildren    Psychosocial Assessment as it relates to rehabilitation:   Patient denies issues with his/her family or home life that may affect their rehabilitation efforts.     SMART Goals:      Increased interest and pleasure in doing things and reduced time feeling nervous or on edge    Patient Specific PSYCHOCOSOCIAL GOALS:  (stress, emotional well being, social support):   reduced anxiety about health    Patient's progress toward SMART and personal goals: utilizing good support from family, pt denies feelings of depression or anxiety, feeling good and happy to be back at the gym.    Plan For next 30 days: Practice relaxation techniques, Exercise, Keep a positive mindset, and Enjoy family    Group and Individual Education: benefits of a positive support system, depression and CAD, and class:  Stress and Your Health     Readiness to change: Maintenance: (Maintaining the behavior change)      OTHER CORE COMPONENTS     Tobacco:   Social History     Tobacco Use   Smoking Status Never   Smokeless Tobacco Never       Tobacco Use Intervention:   N/A:  Patient is a non-smoker     Anginal Symptoms:   Arm pain   NTG use: Compliant with carrying NTG, Understands proper use, and Pt has not used NTG since event     Blood pressure:    Restin//86   Exercise: 140//78    SMART Goals: consistent, controlled resting BP < 130/80      Patient Specific CORE COMPONENT GOALS  (smoking, BP control, angina control, medication): reduced dietary sodium <2000mg and medication compliance    Patient's progress toward SMART and personal goals: reduced dietary sodium, normal resting and exercise BP, 100% medication compliance    Plan For next 30 days: medication compliance, avoid processed foods, engage in regular exercise, and monitor home BP    Group and Individual Education:  understanding high blood pressure and it's relationship to CAD, group class: Understanding Heart Disease, and group class:  Common Heart Medications    Readiness to change: Maintenance: (Maintaining the behavior change)

## 2024-05-31 ENCOUNTER — TELEPHONE (OUTPATIENT)
Dept: DERMATOLOGY | Facility: CLINIC | Age: 69
End: 2024-05-31

## 2024-05-31 ENCOUNTER — CLINICAL SUPPORT (OUTPATIENT)
Dept: CARDIAC REHAB | Facility: CLINIC | Age: 69
End: 2024-05-31
Payer: MEDICARE

## 2024-05-31 DIAGNOSIS — I21.3 ST ELEVATION MYOCARDIAL INFARCTION (STEMI), UNSPECIFIED ARTERY (HCC): Primary | ICD-10-CM

## 2024-05-31 PROCEDURE — 93798 PHYS/QHP OP CAR RHAB W/ECG: CPT

## 2024-08-28 ENCOUNTER — OFFICE VISIT (OUTPATIENT)
Dept: URGENT CARE | Facility: CLINIC | Age: 69
End: 2024-08-28
Payer: MEDICARE

## 2024-08-28 ENCOUNTER — APPOINTMENT (OUTPATIENT)
Dept: RADIOLOGY | Facility: CLINIC | Age: 69
End: 2024-08-28
Payer: MEDICARE

## 2024-08-28 VITALS
OXYGEN SATURATION: 96 % | TEMPERATURE: 97.8 F | SYSTOLIC BLOOD PRESSURE: 132 MMHG | HEART RATE: 66 BPM | DIASTOLIC BLOOD PRESSURE: 88 MMHG | RESPIRATION RATE: 18 BRPM

## 2024-08-28 DIAGNOSIS — M79.671 RIGHT FOOT PAIN: ICD-10-CM

## 2024-08-28 DIAGNOSIS — M79.671 RIGHT FOOT PAIN: Primary | ICD-10-CM

## 2024-08-28 PROCEDURE — 73630 X-RAY EXAM OF FOOT: CPT

## 2024-08-28 PROCEDURE — 99213 OFFICE O/P EST LOW 20 MIN: CPT | Performed by: PHYSICIAN ASSISTANT

## 2024-08-28 PROCEDURE — G0463 HOSPITAL OUTPT CLINIC VISIT: HCPCS | Performed by: PHYSICIAN ASSISTANT

## 2024-08-28 RX ORDER — PREDNISONE 10 MG/1
TABLET ORAL
Qty: 21 TABLET | Refills: 0 | Status: SHIPPED | OUTPATIENT
Start: 2024-08-28

## 2024-08-28 NOTE — PROGRESS NOTES
[facility]    NAME: Jerome Coffey is a 69 y.o. male  : 1955    MRN: [id number]  DATE: 2024  TIME: 9:25 AM    Assessment and Plan   Right foot pain [M79.671]  1. Right foot pain  XR foot 3+ vw right    predniSONE 10 mg tablet        Prior exam room notes, lab test were reviewed.    X-ray right foot: No acute bony changes.  Initial impressions of x-ray by treating provider reviewed with patient.  Await radiologist's final results.    Patient Instructions     Patient Instructions   Contact primary care provider offices soon as possible to arrange further evaluation for joint swelling problems.  May benefit from being seen by orthopedist and/or rheumatologist.    Take prednisone as instructed.  While on prednisone do not take any ibuprofen or ibuprofen like products.  May safely take Tylenol if needed.    You may continue to elevate foot while seated to help control pain and swelling.  May continue Tylenol.  May continue warm or cool compresses for comfort as needed.  Neoprene foot and ankle wrap may be helpful.    If severe worsening of pain, swelling, swelling extending up the leg proceed immediately to emergency room for further evaluation.    Chief Complaint     Chief Complaint   Patient presents with    Foot Pain     R foot pain, starting 3-4 days ago. Denies injury. Foot is swollen and red.        History of Present Illness   Jerome Coffey presents to the clinic c/o  69-year-old male comes in with right foot pain.    Started: About 3 days ago without known injury.  Associated signs and symptoms: Swelling, slight redness, hurts to walk.  Modifying factors: Tylenol, ice.    Patient was seen by primary care in past.  History of gout.  Takes allopurinol.  Seen on 2024 for pain left foot, elbow, knee.  Treated with colchicine.  Seen 2024 for swelling pain left knee.  Treated with Medrol Dosepak.    He has not been seen by orthopedist or rheumatologist for his various  aches and pains.              Review of Systems   Review of Systems   Constitutional:  Positive for activity change. Negative for appetite change, chills and fatigue.   Musculoskeletal:  Positive for arthralgias, gait problem and joint swelling.   Skin:  Positive for color change.       Current Medications     Long-Term Medications   Medication Sig Dispense Refill    allopurinol (ZYLOPRIM) 100 mg tablet Take 100 mg by mouth daily      aspirin 81 mg chewable tablet Chew 1 tablet (81 mg total) daily 60 tablet 0    atorvastatin (LIPITOR) 80 mg tablet Take 1 tablet (80 mg total) by mouth daily with dinner 90 tablet 3    colchicine (COLCRYS) 0.6 mg tablet       colchicine (COLCRYS) 0.6 mg tablet One tid prn gout 21 tablet 0    losartan (COZAAR) 25 mg tablet Take 1 tablet (25 mg total) by mouth daily 90 tablet 3    metoprolol tartrate (LOPRESSOR) 25 mg tablet Take 0.5 tablets (12.5 mg total) by mouth every 12 (twelve) hours 90 tablet 3    nitroglycerin (NITROSTAT) 0.4 mg SL tablet Place 1 tablet (0.4 mg total) under the tongue every 5 (five) minutes as needed for chest pain 100 tablet 0    ticagrelor (BRILINTA) 90 MG Take 1 tablet (90 mg total) by mouth every 12 (twelve) hours 60 tablet 3       Current Allergies     Allergies as of 08/28/2024 - Reviewed 08/28/2024   Allergen Reaction Noted    Clindamycin GI Intolerance 02/17/2015          The following portions of the patient's history were reviewed and updated as appropriate: allergies, current medications, past family history, past medical history, past social history, past surgical history and problem list.  Past Medical History:   Diagnosis Date    Erectile dysfunction     Knee pain, right     Renal calculi      Past Surgical History:   Procedure Laterality Date    CARDIAC CATHETERIZATION N/A 2/19/2024    Procedure: Cardiac pci;  Surgeon: Tl Dominique MD;  Location: [facility];  Service: Cardiology     History reviewed. No pertinent family  history.    Objective   /88   Pulse 66   Temp 97.8 °F (36.6 °C) (Tympanic)   Resp 18   SpO2 96%   No LMP for male patient.       Physical Exam     Physical Exam  Vitals and nursing note reviewed.   Constitutional:       General: He is not in acute distress.     Appearance: Normal appearance. He is well-developed. He is not ill-appearing, toxic-appearing or diaphoretic.   Cardiovascular:      Rate and Rhythm: Normal rate and regular rhythm.      Pulses:           Dorsalis pedis pulses are 1+ on the right side and 0 on the left side.        Posterior tibial pulses are 2+ on the right side and 2+ on the left side.      Comments: Left posterior tibial artery less than 1 but faintly palpable.  Pulmonary:      Effort: Pulmonary effort is normal.   Musculoskeletal:         General: Swelling and tenderness present. No signs of injury.      Right lower leg: No edema.      Left lower leg: No edema.      Comments: Mild swelling right medial foot near tarsal bones.  Lateral and medial malleolus of ankle nontender to palpation.   Skin:     General: Skin is warm and dry.      Findings: Erythema present.      Comments: Right foot and lower leg with increased redness but no gross heat or induration.  Plantar surface right foot with dried blood spots.  Patient says he picks at his feet.   Neurological:      Mental Status: He is alert and oriented to person, place, and time.   Psychiatric:         Mood and Affect: Mood normal.         Behavior: Behavior normal.

## 2024-08-28 NOTE — PATIENT INSTRUCTIONS
Contact primary care provider offices soon as possible to arrange further evaluation for joint swelling problems.  May benefit from being seen by orthopedist and/or rheumatologist.    Take prednisone as instructed.  While on prednisone do not take any ibuprofen or ibuprofen like products.  May safely take Tylenol if needed.    You may continue to elevate foot while seated to help control pain and swelling.  May continue Tylenol.  May continue warm or cool compresses for comfort as needed.  Neoprene foot and ankle wrap may be helpful.    If severe worsening of pain, swelling, swelling extending up the leg proceed immediately to emergency room for further evaluation.

## 2024-08-31 ENCOUNTER — HOSPITAL ENCOUNTER (EMERGENCY)
Facility: HOSPITAL | Age: 69
Discharge: HOME | End: 2024-08-31
Attending: EMERGENCY MEDICINE
Payer: MEDICARE

## 2024-08-31 VITALS
DIASTOLIC BLOOD PRESSURE: 83 MMHG | TEMPERATURE: 98 F | HEART RATE: 67 BPM | BODY MASS INDEX: 32.87 KG/M2 | WEIGHT: 249.12 LBS | RESPIRATION RATE: 18 BRPM | OXYGEN SATURATION: 98 % | SYSTOLIC BLOOD PRESSURE: 138 MMHG

## 2024-08-31 DIAGNOSIS — M25.50 POLYARTHRALGIA: ICD-10-CM

## 2024-08-31 DIAGNOSIS — M25.561 ACUTE PAIN OF RIGHT KNEE: Primary | ICD-10-CM

## 2024-08-31 PROCEDURE — 99284 EMERGENCY DEPT VISIT MOD MDM: CPT

## 2024-08-31 PROCEDURE — 99282 EMERGENCY DEPT VISIT SF MDM: CPT

## 2024-08-31 RX ORDER — PREDNISONE 10 MG/1
TABLET ORAL
Qty: 14 TABLET | Refills: 0 | Status: SHIPPED | OUTPATIENT
Start: 2024-08-31 | End: 2024-09-05

## 2024-09-01 NOTE — ED PROVIDER NOTES
History  Chief Complaint   Patient presents with    Knee Pain     Patient with right sided knee and foot pain, patient states several weeks ago with bursitis on left, recently seen at urgent care for same      Jerome is a 69-year-old presenting to the emergency department with foot and knee pain x 4 days.  He reports that he has history of both prepatellar bursitis and gout.  Stating that he has never had gout to the knee before.  Reports that he had recent flares of similar symptoms in his opposite knee and elbow.  The area is warm to touch he denies fevers, chills, recent injury, previous surgery, injections to the knee.  He was seen by urgent care and has been on prednisone for 3 days.  He reports he has 2 remaining days of prednisone.  He is unable to take NSAIDs.  He has not seen orthopedics or rheumatology.      Knee Pain  Associated symptoms: no fever and no neck pain        Prior to Admission Medications   Prescriptions Last Dose Informant Patient Reported? Taking?   Boswellia-Glucosamine-Vit D (Osteo Bi-Flex One Per Day) TABS   Yes No   Sig: Take by mouth   Ergocalciferol (VITAMIN D2 PO)   Yes No   Sig: Take by mouth   Patient not taking: Reported on 4/2/2024   Glucosamine 750 MG TABS   Yes No   Sig: Take 2 tablets by mouth daily   Patient not taking: Reported on 4/2/2024   Multiple Vitamins-Minerals (MENS MULTIVITAMIN PO)   Yes No   Omega-3 Fatty Acids (Fish Oil Omega-3) 1000 MG CAPS   Yes No   Sig: Take 2 g by mouth daily   allopurinol (ZYLOPRIM) 100 mg tablet   Yes No   Sig: Take 100 mg by mouth daily   aspirin 81 mg chewable tablet   No No   Sig: Chew 1 tablet (81 mg total) daily   atorvastatin (LIPITOR) 80 mg tablet 8/31/2024  No Yes   Sig: Take 1 tablet (80 mg total) by mouth daily with dinner   colchicine (COLCRYS) 0.6 mg tablet   Yes No   colchicine (COLCRYS) 0.6 mg tablet   No No   Sig: One tid prn gout   losartan (COZAAR) 25 mg tablet 8/31/2024  No Yes   Sig: Take 1 tablet (25 mg total) by mouth  daily   metoprolol tartrate (LOPRESSOR) 25 mg tablet 8/31/2024  No Yes   Sig: Take 0.5 tablets (12.5 mg total) by mouth every 12 (twelve) hours   nitroglycerin (NITROSTAT) 0.4 mg SL tablet   No No   Sig: Place 1 tablet (0.4 mg total) under the tongue every 5 (five) minutes as needed for chest pain   predniSONE 10 mg tablet   No No   Sig: Take 6 tablets on day 1; 5 on day 2; 4 on day 3; 3 on day 4; 2 on day 5; 1 on day 6. Take with food.   ticagrelor (BRILINTA) 90 MG 8/31/2024  No Yes   Sig: Take 1 tablet (90 mg total) by mouth every 12 (twelve) hours      Facility-Administered Medications: None       Past Medical History:   Diagnosis Date    Erectile dysfunction     Knee pain, right     Renal calculi        Past Surgical History:   Procedure Laterality Date    CARDIAC CATHETERIZATION N/A 2/19/2024    Procedure: Cardiac pci;  Surgeon: Tl Dominique MD;  Location: [facility];  Service: Cardiology       No family history on file.  I have reviewed and agree with the history as documented.    E-Cigarette/Vaping    E-Cigarette Use Never User      E-Cigarette/Vaping Substances     Social History     Tobacco Use    Smoking status: Never    Smokeless tobacco: Never   Vaping Use    Vaping status: Never Used   Substance Use Topics    Alcohol use: Yes     Comment: social drinker    Drug use: Never       Review of Systems   Constitutional:  Negative for chills and fever.   Gastrointestinal:  Negative for nausea and vomiting.   Musculoskeletal:  Positive for arthralgias and joint swelling. Negative for gait problem, myalgias and neck pain.   Skin:  Negative for color change, rash and wound.   Neurological:  Negative for syncope, weakness and numbness.   All other systems reviewed and are negative.      Physical Exam  Physical Exam  Vitals and nursing note reviewed.   Constitutional:       General: He is not in acute distress.     Appearance: He is well-developed.   HENT:      Head: Normocephalic and atraumatic.    Cardiovascular:      Rate and Rhythm: Normal rate and regular rhythm.   Pulmonary:      Effort: Pulmonary effort is normal.   Abdominal:      Palpations: Abdomen is soft.   Musculoskeletal:      Cervical back: Neck supple.        Legs:    Skin:     General: Skin is warm and dry.      Capillary Refill: Capillary refill takes less than 2 seconds.   Neurological:      Mental Status: He is alert.         Vital Signs  ED Triage Vitals [08/31/24 1906]   Temperature Pulse Respirations Blood Pressure SpO2   98 °F (36.7 °C) 67 18 138/83 98 %      Temp Source Heart Rate Source Patient Position - Orthostatic VS BP Location FiO2 (%)   Oral Monitor Sitting Right arm --      Pain Score       9           Vitals:    08/31/24 1906   BP: 138/83   Pulse: 67   Patient Position - Orthostatic VS: Sitting         Visual Acuity      ED Medications  Medications - No data to display    Diagnostic Studies  Results Reviewed       None                   No orders to display              Procedures  Procedures         ED Course                                 SBIRT 22yo+      Flowsheet Row Most Recent Value   Initial Alcohol Screen: US AUDIT-C     1. How often do you have a drink containing alcohol? 0 Filed at: 08/31/2024 1955   2. How many drinks containing alcohol do you have on a typical day you are drinking?  0 Filed at: 08/31/2024 1955   3b. FEMALE Any Age, or MALE 65+: How often do you have 4 or more drinks on one occassion? 0 Filed at: 08/31/2024 1955   Audit-C Score 0 Filed at: 08/31/2024 1955   TRISTAN: How many times in the past year have you...    Used an illegal drug or used a prescription medication for non-medical reasons? Never Filed at: 08/31/2024 1955                      Medical Decision Making  DDx includes prepatellar bursitis, gout, cellulitis, DVT, arthritis  History suggestive of prepatellar bursitis or gout.  Physical exam and history together most consistent with gout.  Patient is 3 days into steroids not noticing a  "significant difference, will extend his course as he is not able to take NSAIDs.  Discussed follow-up with rheumatology and/or orthopedics.    Discussed findings from the visit with the patient.  We had a conversation regarding supportive care and indications for return.  Recommended appropriate follow-up.  Patient and/or family understand and agree with plan.    Portions of the record may have been created with voice recognition software. Occasional use of the incorrect word or \"sound a like\" substitutions may have occurred due to the inherent limitations of voice recognition software. Read the chart carefully and recognize, using context, where substitutions have occurred.       Risk  Prescription drug management.                 Disposition  Final diagnoses:   Acute pain of right knee   Polyarthralgia     Time reflects when diagnosis was documented in both MDM as applicable and the Disposition within this note       Time User Action Codes Description Comment    8/31/2024  8:04 PM Janet Zurita [M25.561] Acute pain of right knee     8/31/2024  8:04 PM Janet Zurita [M25.50] Polyarthralgia           ED Disposition       ED Disposition   Discharge    Condition   Stable    Date/Time   Sat Aug 31, 2024 2006    Comment   Jerome Coffey discharge to home/self care.                   Follow-up Information       Follow up With Specialties Details Why Contact Info    Jerome Wiggins, DO Family Medicine   [address] [facility]   [facility] [id number] [phone]              Discharge Medication List as of 8/31/2024  8:06 PM        START taking these medications    Details   !! predniSONE 10 mg tablet Multiple Dosages:Starting Sat 8/31/2024, Until Sun 9/1/2024 at 2359, THEN Starting Mon 9/2/2024, Until Mon 9/2/2024 at 2359, THEN Starting Tue 9/3/2024, Until Tue 9/3/2024 at 2359, THEN Starting Wed 9/4/2024, Until Wed 9/4/2024 at 2359Take 4 tablets  (40 mg total) by mouth daily for 2 days, THEN 3 tablets " (30 mg total) daily for 1 day, THEN 2 tablets (20 mg total) daily for 1 day, THEN 1 tablet (10 mg total) daily for 1 day., Normal       !! - Potential duplicate medications found. Please discuss with provider.        CONTINUE these medications which have NOT CHANGED    Details   atorvastatin (LIPITOR) 80 mg tablet Take 1 tablet (80 mg total) by mouth daily with dinner, Starting Tue 4/2/2024, Normal      losartan (COZAAR) 25 mg tablet Take 1 tablet (25 mg total) by mouth daily, Starting Tue 4/2/2024, Normal      metoprolol tartrate (LOPRESSOR) 25 mg tablet Take 0.5 tablets (12.5 mg total) by mouth every 12 (twelve) hours, Starting Tue 4/2/2024, Normal      ticagrelor (BRILINTA) 90 MG Take 1 tablet (90 mg total) by mouth every 12 (twelve) hours, Starting Sat 5/11/2024, Normal      allopurinol (ZYLOPRIM) 100 mg tablet Take 100 mg by mouth daily, Historical Med      aspirin 81 mg chewable tablet Chew 1 tablet (81 mg total) daily, Starting Wed 2/21/2024, Until Sun 4/21/2024, Normal      Boswellia-Glucosamine-Vit D (Osteo Bi-Flex One Per Day) TABS Take by mouth, Historical Med      !! colchicine (COLCRYS) 0.6 mg tablet Historical Med      !! colchicine (COLCRYS) 0.6 mg tablet One tid prn gout, Print      Ergocalciferol (VITAMIN D2 PO) Take by mouth, Historical Med      Glucosamine 750 MG TABS Take 2 tablets by mouth daily, Historical Med      Multiple Vitamins-Minerals (MENS MULTIVITAMIN PO) Historical Med      nitroglycerin (NITROSTAT) 0.4 mg SL tablet Place 1 tablet (0.4 mg total) under the tongue every 5 (five) minutes as needed for chest pain, Starting Tue 2/20/2024, Until Thu 3/21/2024 at 2359, Normal      Omega-3 Fatty Acids (Fish Oil Omega-3) 1000 MG CAPS Take 2 g by mouth daily, Historical Med      !! predniSONE 10 mg tablet Take 6 tablets on day 1; 5 on day 2; 4 on day 3; 3 on day 4; 2 on day 5; 1 on day 6. Take with food., Normal       !! - Potential duplicate medications found. Please discuss with provider.           No discharge procedures on file.    PDMP Review       None            ED Provider  Electronically Signed by             Janet Zurita PA-C  09/01/24 0617

## 2024-09-01 NOTE — DISCHARGE INSTRUCTIONS
Complete extended prednisone taper. See instruction on the bottle.  Continue to take colchicine and allopurinol.  Take Tylenol every 6 hours for discomfort. Apply ice for discomfort.   Consider follow-up with Rheumatology and/ or Orthopedics.    Immediate family member

## 2024-09-02 ENCOUNTER — HOSPITAL ENCOUNTER (EMERGENCY)
Facility: HOSPITAL | Age: 69
Discharge: HOME | End: 2024-09-02
Attending: EMERGENCY MEDICINE | Admitting: EMERGENCY MEDICINE
Payer: MEDICARE

## 2024-09-02 ENCOUNTER — APPOINTMENT (EMERGENCY)
Dept: CT IMAGING | Facility: HOSPITAL | Age: 69
End: 2024-09-02
Payer: MEDICARE

## 2024-09-02 ENCOUNTER — APPOINTMENT (EMERGENCY)
Dept: NON INVASIVE DIAGNOSTICS | Facility: HOSPITAL | Age: 69
End: 2024-09-02
Payer: MEDICARE

## 2024-09-02 VITALS
HEIGHT: 73 IN | WEIGHT: 245 LBS | HEART RATE: 64 BPM | RESPIRATION RATE: 18 BRPM | SYSTOLIC BLOOD PRESSURE: 134 MMHG | TEMPERATURE: 97.5 F | BODY MASS INDEX: 32.47 KG/M2 | DIASTOLIC BLOOD PRESSURE: 73 MMHG | OXYGEN SATURATION: 98 %

## 2024-09-02 DIAGNOSIS — M25.561 RIGHT KNEE PAIN: ICD-10-CM

## 2024-09-02 DIAGNOSIS — M71.169: Primary | ICD-10-CM

## 2024-09-02 LAB
ANION GAP SERPL CALCULATED.3IONS-SCNC: 10 MMOL/L (ref 4–13)
ANION GAP SERPL CALCULATED.3IONS-SCNC: 8 MMOL/L (ref 4–13)
BASOPHILS # BLD AUTO: 0.01 THOUSANDS/ÂΜL (ref 0–0.1)
BASOPHILS NFR BLD AUTO: 0 % (ref 0–1)
BUN SERPL-MCNC: 17 MG/DL (ref 5–25)
BUN SERPL-MCNC: 18 MG/DL (ref 5–25)
CALCIUM SERPL-MCNC: 9.1 MG/DL (ref 8.4–10.2)
CALCIUM SERPL-MCNC: 9.3 MG/DL (ref 8.4–10.2)
CHLORIDE SERPL-SCNC: 104 MMOL/L (ref 96–108)
CHLORIDE SERPL-SCNC: 106 MMOL/L (ref 96–108)
CO2 SERPL-SCNC: 23 MMOL/L (ref 21–32)
CO2 SERPL-SCNC: 26 MMOL/L (ref 21–32)
CREAT SERPL-MCNC: 0.99 MG/DL (ref 0.6–1.3)
CREAT SERPL-MCNC: 0.99 MG/DL (ref 0.6–1.3)
CRP SERPL QL: 16.3 MG/L
EOSINOPHIL # BLD AUTO: 0 THOUSAND/ÂΜL (ref 0–0.61)
EOSINOPHIL NFR BLD AUTO: 0 % (ref 0–6)
ERYTHROCYTE [DISTWIDTH] IN BLOOD BY AUTOMATED COUNT: 14.8 % (ref 11.6–15.1)
ERYTHROCYTE [SEDIMENTATION RATE] IN BLOOD: 16 MM/HOUR (ref 0–19)
GFR SERPL CREATININE-BSD FRML MDRD: 77 ML/MIN/1.73SQ M
GFR SERPL CREATININE-BSD FRML MDRD: 77 ML/MIN/1.73SQ M
GLUCOSE SERPL-MCNC: 109 MG/DL (ref 65–140)
GLUCOSE SERPL-MCNC: 125 MG/DL (ref 65–140)
HCT VFR BLD AUTO: 44.6 % (ref 36.5–49.3)
HGB BLD-MCNC: 15.3 G/DL (ref 12–17)
IMM GRANULOCYTES # BLD AUTO: 0.1 THOUSAND/UL (ref 0–0.2)
IMM GRANULOCYTES NFR BLD AUTO: 1 % (ref 0–2)
LYMPHOCYTES # BLD AUTO: 1.85 THOUSANDS/ÂΜL (ref 0.6–4.47)
LYMPHOCYTES NFR BLD AUTO: 12 % (ref 14–44)
MCH RBC QN AUTO: 31.6 PG (ref 26.8–34.3)
MCHC RBC AUTO-ENTMCNC: 34.3 G/DL (ref 31.4–37.4)
MCV RBC AUTO: 92 FL (ref 82–98)
MONOCYTES # BLD AUTO: 0.73 THOUSAND/ÂΜL (ref 0.17–1.22)
MONOCYTES NFR BLD AUTO: 5 % (ref 4–12)
NEUTROPHILS # BLD AUTO: 12.23 THOUSANDS/ÂΜL (ref 1.85–7.62)
NEUTS SEG NFR BLD AUTO: 82 % (ref 43–75)
NRBC BLD AUTO-RTO: 0 /100 WBCS
PLATELET # BLD AUTO: 268 THOUSANDS/UL (ref 149–390)
PMV BLD AUTO: 10.6 FL (ref 8.9–12.7)
POTASSIUM SERPL-SCNC: 4.2 MMOL/L (ref 3.5–5.3)
POTASSIUM SERPL-SCNC: 6.3 MMOL/L (ref 3.5–5.3)
RBC # BLD AUTO: 4.84 MILLION/UL (ref 3.88–5.62)
SODIUM SERPL-SCNC: 137 MMOL/L (ref 135–147)
SODIUM SERPL-SCNC: 140 MMOL/L (ref 135–147)
WBC # BLD AUTO: 14.92 THOUSAND/UL (ref 4.31–10.16)

## 2024-09-02 PROCEDURE — 96365 THER/PROPH/DIAG IV INF INIT: CPT

## 2024-09-02 PROCEDURE — 96361 HYDRATE IV INFUSION ADD-ON: CPT

## 2024-09-02 PROCEDURE — 85652 RBC SED RATE AUTOMATED: CPT | Performed by: EMERGENCY MEDICINE

## 2024-09-02 PROCEDURE — 94640 AIRWAY INHALATION TREATMENT: CPT

## 2024-09-02 PROCEDURE — 36415 COLL VENOUS BLD VENIPUNCTURE: CPT | Performed by: EMERGENCY MEDICINE

## 2024-09-02 PROCEDURE — 99285 EMERGENCY DEPT VISIT HI MDM: CPT | Performed by: EMERGENCY MEDICINE

## 2024-09-02 PROCEDURE — 70450 CT HEAD/BRAIN W/O DYE: CPT

## 2024-09-02 PROCEDURE — 93971 EXTREMITY STUDY: CPT | Performed by: SURGERY

## 2024-09-02 PROCEDURE — 86140 C-REACTIVE PROTEIN: CPT | Performed by: EMERGENCY MEDICINE

## 2024-09-02 PROCEDURE — 99284 EMERGENCY DEPT VISIT MOD MDM: CPT

## 2024-09-02 PROCEDURE — 85025 COMPLETE CBC W/AUTO DIFF WBC: CPT | Performed by: EMERGENCY MEDICINE

## 2024-09-02 PROCEDURE — 93971 EXTREMITY STUDY: CPT

## 2024-09-02 PROCEDURE — 80048 BASIC METABOLIC PNL TOTAL CA: CPT | Performed by: EMERGENCY MEDICINE

## 2024-09-02 PROCEDURE — 73700 CT LOWER EXTREMITY W/O DYE: CPT

## 2024-09-02 PROCEDURE — 93005 ELECTROCARDIOGRAM TRACING: CPT

## 2024-09-02 RX ORDER — ALBUTEROL SULFATE 0.83 MG/ML
2.5 SOLUTION RESPIRATORY (INHALATION) ONCE
Status: COMPLETED | OUTPATIENT
Start: 2024-09-02 | End: 2024-09-02

## 2024-09-02 RX ORDER — CALCIUM GLUCONATE 20 MG/ML
1 INJECTION, SOLUTION INTRAVENOUS ONCE
Status: COMPLETED | OUTPATIENT
Start: 2024-09-02 | End: 2024-09-02

## 2024-09-02 RX ORDER — SULFAMETHOXAZOLE/TRIMETHOPRIM 800-160 MG
1 TABLET ORAL 2 TIMES DAILY
Qty: 28 TABLET | Refills: 0 | Status: SHIPPED | OUTPATIENT
Start: 2024-09-02 | End: 2024-09-16

## 2024-09-02 RX ORDER — SULFAMETHOXAZOLE/TRIMETHOPRIM 800-160 MG
1 TABLET ORAL ONCE
Status: COMPLETED | OUTPATIENT
Start: 2024-09-02 | End: 2024-09-02

## 2024-09-02 RX ADMIN — ALBUTEROL SULFATE 2.5 MG: 2.5 SOLUTION RESPIRATORY (INHALATION) at 14:46

## 2024-09-02 RX ADMIN — SULFAMETHOXAZOLE AND TRIMETHOPRIM 1 TABLET: 800; 160 TABLET ORAL at 16:54

## 2024-09-02 RX ADMIN — CALCIUM GLUCONATE 1 G: 20 INJECTION, SOLUTION INTRAVENOUS at 14:40

## 2024-09-02 RX ADMIN — SODIUM CHLORIDE 1000 ML: 0.9 INJECTION, SOLUTION INTRAVENOUS at 14:49

## 2024-09-02 NOTE — ED PROVIDER NOTES
History  Chief Complaint   Patient presents with    Fall     Patient reporting right knee gave out this morning and he fell and hit his head off a wall. Denies LOC, + thinners. GCS 15.    Knee Pain     Patient reporting was here Saturday night for right knee pain. States swelling is now worse and goes down into his lower leg. Has been taking prednisone.      HPI  Patient is a 69-year-old male presents with fall related head injury as well as right-sided knee pain and swelling.  Patient states that he was recently seen for his right knee pain and was told that he may have gout and was prescribed prednisone.  States that today while he was walking he felt like his right knee gave out and fell forward hitting his head against the wall.  Denies loss of consciousness but does state that he takes Brilinta.  Patient's wife reports that patient did not have any changes in mental status since the injury with no episodes of vomiting.  Injury occurred roughly 3 hours prior to arrival.  Patient without any vision change, vertigo, unilateral weakness, numbness.  Patient states that despite taking the prednisone his right knee pain has persisted with slight increase in the swelling.  Still has full range of motion however with pain.  Denies any weakness, numbness distal to the painful site.  Reports no injury to the knee.      Prior to Admission Medications   Prescriptions Last Dose Informant Patient Reported? Taking?   Boswellia-Glucosamine-Vit D (Osteo Bi-Flex One Per Day) TABS   Yes No   Sig: Take by mouth   Ergocalciferol (VITAMIN D2 PO)   Yes No   Sig: Take by mouth   Patient not taking: Reported on 4/2/2024   Glucosamine 750 MG TABS   Yes No   Sig: Take 2 tablets by mouth daily   Patient not taking: Reported on 4/2/2024   Multiple Vitamins-Minerals (MENS MULTIVITAMIN PO)   Yes No   Omega-3 Fatty Acids (Fish Oil Omega-3) 1000 MG CAPS   Yes No   Sig: Take 2 g by mouth daily   allopurinol (ZYLOPRIM) 100 mg tablet   Yes No    Sig: Take 100 mg by mouth daily   aspirin 81 mg chewable tablet   No Yes   Sig: Chew 1 tablet (81 mg total) daily   atorvastatin (LIPITOR) 80 mg tablet   No No   Sig: Take 1 tablet (80 mg total) by mouth daily with dinner   colchicine (COLCRYS) 0.6 mg tablet   Yes No   colchicine (COLCRYS) 0.6 mg tablet   No No   Sig: One tid prn gout   losartan (COZAAR) 25 mg tablet   No No   Sig: Take 1 tablet (25 mg total) by mouth daily   metoprolol tartrate (LOPRESSOR) 25 mg tablet   No No   Sig: Take 0.5 tablets (12.5 mg total) by mouth every 12 (twelve) hours   nitroglycerin (NITROSTAT) 0.4 mg SL tablet   No No   Sig: Place 1 tablet (0.4 mg total) under the tongue every 5 (five) minutes as needed for chest pain   predniSONE 10 mg tablet   No No   Sig: Take 6 tablets on day 1; 5 on day 2; 4 on day 3; 3 on day 4; 2 on day 5; 1 on day 6. Take with food.   predniSONE 10 mg tablet   No No   Sig: Take 4 tablets (40 mg total) by mouth daily for 2 days, THEN 3 tablets (30 mg total) daily for 1 day, THEN 2 tablets (20 mg total) daily for 1 day, THEN 1 tablet (10 mg total) daily for 1 day.   ticagrelor (BRILINTA) 90 MG   No Yes   Sig: Take 1 tablet (90 mg total) by mouth every 12 (twelve) hours      Facility-Administered Medications: None       Past Medical History:   Diagnosis Date    Erectile dysfunction     Knee pain, right     Renal calculi        Past Surgical History:   Procedure Laterality Date    CARDIAC CATHETERIZATION N/A 2/19/2024    Procedure: Cardiac pci;  Surgeon: Tl Dominique MD;  Location: [facility];  Service: Cardiology       History reviewed. No pertinent family history.  I have reviewed and agree with the history as documented.    E-Cigarette/Vaping    E-Cigarette Use Never User      E-Cigarette/Vaping Substances     Social History     Tobacco Use    Smoking status: Never    Smokeless tobacco: Never   Vaping Use    Vaping status: Never Used   Substance Use Topics    Alcohol use: Yes     Comment: social  drinker    Drug use: Never       Review of Systems   Constitutional:  Negative for chills, diaphoresis, fever and unexpected weight change.   HENT:  Negative for ear pain and sore throat.    Eyes:  Negative for visual disturbance.   Respiratory:  Negative for cough, chest tightness and shortness of breath.    Cardiovascular:  Negative for chest pain and leg swelling.   Gastrointestinal:  Negative for abdominal distention, abdominal pain, constipation, diarrhea, nausea and vomiting.   Endocrine: Negative.    Genitourinary:  Negative for difficulty urinating and dysuria.   Musculoskeletal:  Positive for arthralgias.   Skin: Negative.    Allergic/Immunologic: Negative.    Neurological: Negative.    Hematological: Negative.    Psychiatric/Behavioral: Negative.     All other systems reviewed and are negative.      Physical Exam  Physical Exam  Vitals and nursing note reviewed.   Constitutional:       General: He is not in acute distress.     Appearance: Normal appearance. He is not ill-appearing.   HENT:      Head: Normocephalic and atraumatic.      Right Ear: External ear normal.      Left Ear: External ear normal.      Nose: Nose normal.      Mouth/Throat:      Mouth: Mucous membranes are moist.      Pharynx: Oropharynx is clear.   Eyes:      General: No scleral icterus.        Right eye: No discharge.         Left eye: No discharge.      Extraocular Movements: Extraocular movements intact.      Conjunctiva/sclera: Conjunctivae normal.      Pupils: Pupils are equal, round, and reactive to light.   Cardiovascular:      Rate and Rhythm: Normal rate and regular rhythm.      Pulses: Normal pulses.      Heart sounds: Normal heart sounds.   Pulmonary:      Effort: Pulmonary effort is normal.      Breath sounds: Normal breath sounds.   Abdominal:      General: Abdomen is flat. Bowel sounds are normal. There is no distension.      Palpations: Abdomen is soft.      Tenderness: There is no abdominal tenderness. There is no  guarding or rebound.   Musculoskeletal:         General: Tenderness (Tenderness localized just below the patella) present. Normal range of motion.      Cervical back: Normal range of motion and neck supple.      Comments: Despite the pain patient has full range of motion   Skin:     General: Skin is warm and dry.      Capillary Refill: Capillary refill takes less than 2 seconds.      Findings: Erythema (Just below the right patella) present.   Neurological:      General: No focal deficit present.      Mental Status: He is alert and oriented to person, place, and time. Mental status is at baseline.      Cranial Nerves: No cranial nerve deficit.      Sensory: No sensory deficit.      Motor: No weakness.      Gait: Gait normal.   Psychiatric:         Mood and Affect: Mood normal.         Behavior: Behavior normal.         Thought Content: Thought content normal.         Judgment: Judgment normal.         Vital Signs  ED Triage Vitals   Temperature Pulse Respirations Blood Pressure SpO2   09/02/24 1219 09/02/24 1219 09/02/24 1219 09/02/24 1219 09/02/24 1219   97.5 °F (36.4 °C) 71 18 124/74 96 %      Temp src Heart Rate Source Patient Position - Orthostatic VS BP Location FiO2 (%)   -- 09/02/24 1553 09/02/24 1553 09/02/24 1553 --    Monitor Sitting Right arm       Pain Score       09/02/24 1219       10 - Worst Possible Pain           Vitals:    09/02/24 1219 09/02/24 1553   BP: 124/74 134/73   Pulse: 71 64   Patient Position - Orthostatic VS:  Sitting         Visual Acuity  Visual Acuity      Flowsheet Row Most Recent Value   L Pupil Size (mm) 3   R Pupil Size (mm) 3            ED Medications  Medications   sulfamethoxazole-trimethoprim (BACTRIM DS) 800-160 mg per tablet 1 tablet (has no administration in time range)   calcium gluconate 1 g in sodium chloride 0.9% 50 mL (premix) (0 g Intravenous Stopped 9/2/24 1528)   albuterol inhalation solution 2.5 mg (2.5 mg Nebulization Given 9/2/24 1446)   sodium chloride 0.9 %  bolus 1,000 mL (1,000 mL Intravenous New Bag 9/2/24 1449)       Diagnostic Studies  Results Reviewed       Procedure Component Value Units Date/Time    Basic metabolic panel [[id number]] Collected: 09/02/24 1601    Lab Status: Final result Specimen: Blood from Arm, Left Updated: 09/02/24 1635     Sodium 140 mmol/L      Potassium 4.2 mmol/L      Chloride 106 mmol/L      CO2 26 mmol/L      ANION GAP 8 mmol/L      BUN 17 mg/dL      Creatinine 0.99 mg/dL      Glucose 109 mg/dL      Calcium 9.1 mg/dL      eGFR 77 ml/min/1.73sq m     Narrative:      National Kidney Disease Foundation guidelines for Chronic Kidney Disease (CKD):     Stage 1 with normal or high GFR (GFR > 90 mL/min/1.73 square meters)    Stage 2 Mild CKD (GFR = 60-89 mL/min/1.73 square meters)    Stage 3A Moderate CKD (GFR = 45-59 mL/min/1.73 square meters)    Stage 3B Moderate CKD (GFR = 30-44 mL/min/1.73 square meters)    Stage 4 Severe CKD (GFR = 15-29 mL/min/1.73 square meters)    Stage 5 End Stage CKD (GFR <15 mL/min/1.73 square meters)  Note: GFR calculation is accurate only with a steady state creatinine    Basic metabolic panel [[id number]]  (Abnormal) Collected: 09/02/24 1359    Lab Status: Final result Specimen: Blood from Hand, Left Updated: 09/02/24 1430     Sodium 137 mmol/L      Potassium 6.3 mmol/L      Chloride 104 mmol/L      CO2 23 mmol/L      ANION GAP 10 mmol/L      BUN 18 mg/dL      Creatinine 0.99 mg/dL      Glucose 125 mg/dL      Calcium 9.3 mg/dL      eGFR 77 ml/min/1.73sq m     Narrative:      National Kidney Disease Foundation guidelines for Chronic Kidney Disease (CKD):     Stage 1 with normal or high GFR (GFR > 90 mL/min/1.73 square meters)    Stage 2 Mild CKD (GFR = 60-89 mL/min/1.73 square meters)    Stage 3A Moderate CKD (GFR = 45-59 mL/min/1.73 square meters)    Stage 3B Moderate CKD (GFR = 30-44 mL/min/1.73 square meters)    Stage 4 Severe CKD (GFR = 15-29 mL/min/1.73 square meters)    Stage 5 End Stage CKD (GFR <15  mL/min/1.73 square meters)  Note: GFR calculation is accurate only with a steady state creatinine    C-reactive protein [[id number]]  (Abnormal) Collected: 09/02/24 1359    Lab Status: Final result Specimen: Blood from Hand, Left Updated: 09/02/24 1427     CRP 16.3 mg/L     Sedimentation rate, automated [[id number]]  (Normal) Collected: 09/02/24 1359    Lab Status: Final result Specimen: Blood from Hand, Left Updated: 09/02/24 1413     Sed Rate 16 mm/hour     CBC and differential [[id number]]  (Abnormal) Collected: 09/02/24 1359    Lab Status: Final result Specimen: Blood from Hand, Left Updated: 09/02/24 1407     WBC 14.92 Thousand/uL      RBC 4.84 Million/uL      Hemoglobin 15.3 g/dL      Hematocrit 44.6 %      MCV 92 fL      MCH 31.6 pg      MCHC 34.3 g/dL      RDW 14.8 %      MPV 10.6 fL      Platelets 268 Thousands/uL      nRBC 0 /100 WBCs      Segmented % 82 %      Immature Grans % 1 %      Lymphocytes % 12 %      Monocytes % 5 %      Eosinophils Relative 0 %      Basophils Relative 0 %      Absolute Neutrophils 12.23 Thousands/µL      Absolute Immature Grans 0.10 Thousand/uL      Absolute Lymphocytes 1.85 Thousands/µL      Absolute Monocytes 0.73 Thousand/µL      Eosinophils Absolute 0.00 Thousand/µL      Basophils Absolute 0.01 Thousands/µL                    CT head without contrast   Final Result by Ian White MD (09/02 1502)      No acute intracranial abnormality.                  Workstation performed: [id number]         CT lower extremity wo contrast right   Final Result by Jed Valle MD (09/02 1519)      No acute fracture or dislocation.         Workstation performed: [id number]         VAS VENOUS DUPLEX -LOWER LIMB UNILATERAL    (Results Pending)              Procedures  Procedures         ED Course  ED Course as of 09/02/24 1648   Mon Sep 02, 2024   1523 Procedure Note: EKG  Date/Time: 09/02/24 3:23 PM   Interpreted by: SHARON CRANDALL  Indications / Diagnosis: hyperkalemia  ECG reviewed by  me, the ED Provider: yes   The EKG demonstrates:  Rhythm: normal sinus  Intervals: normal intervals  Axis: normal axis  QRS/Blocks: normal QRS  ST Changes: No acute ST Changes, no STD/JUAN.                                     SBIRT 22yo+      Flowsheet Row Most Recent Value   Initial Alcohol Screen: US AUDIT-C     1. How often do you have a drink containing alcohol? 0 Filed at: 09/02/2024 1226   2. How many drinks containing alcohol do you have on a typical day you are drinking?  0 Filed at: 09/02/2024 1226   3a. Male UNDER 65: How often do you have five or more drinks on one occasion? 0 Filed at: 09/02/2024 1226   3b. FEMALE Any Age, or MALE 65+: How often do you have 4 or more drinks on one occassion? 0 Filed at: 09/02/2024 1226   Audit-C Score 0 Filed at: 09/02/2024 1226   TRISTAN: How many times in the past year have you...    Used an illegal drug or used a prescription medication for non-medical reasons? Never Filed at: 09/02/2024 1226                      Medical Decision Making  69-year-old male presents with right-sided knee pain and swelling  Will obtain labs to assess thoughts of septic joint  Exam findings not consistent with septic joint  Will also obtain venous duplex to rule out possible DVT  Also obtain imaging studies to rule out possible intracranial bleed as well as bony injury of the right knee  Patient does have elevated CRP  Patient with localized pain in the prepatellar region concerning for possible prepatellar bursitis  DVT was negative  Hyperkalemia was noted with no EKG concerning for hyperkalemia.   lab result possibly hemolyzed so repeat lab was obtained  Patient without any hyperkalemia  Patient discharged with antibiotics to treat the possible infected prepatellar bursa and instructed patient strict return precaution if the symptom worsens      Problems Addressed:  Right knee pain: acute illness or injury  Septic prepatellar bursitis: acute illness or injury    Amount and/or Complexity of  Data Reviewed  Labs: ordered.  Radiology: ordered.    Risk  Prescription drug management.                 Disposition  Final diagnoses:   Septic prepatellar bursitis   Right knee pain     Time reflects when diagnosis was documented in both MDM as applicable and the Disposition within this note       Time User Action Codes Description Comment    9/2/2024  4:18 PM Sharad Liu Add [M71.169] Septic prepatellar bursitis     9/2/2024  4:18 PM Sharad Liu [M25.561] Right knee pain           ED Disposition       ED Disposition   Discharge    Condition   Stable    Date/Time   Mon Sep 2, 2024 1636    Comment   Jerome Coffey discharge to home/self care.                   Follow-up Information       Follow up With Specialties Details Why Contact Info Additional Information    [facility] Schedule an appointment as soon as possible for a visit   [address]  [facility] [address] [phone] [facility], [facility] [address] [facility] [address], [place], [place], [address] [phone]    Jerome Wiggins,  Family Medicine Schedule an appointment as soon as possible for a visit   [address]   [facility] [address]  [phone]       [facility] Emergency Department Emergency Medicine Go to  If symptoms worsen [address] [facility] [address] [phone] [facility] Emergency Department, [address] [place], [place], [address]            Patient's Medications   Discharge Prescriptions    SULFAMETHOXAZOLE-TRIMETHOPRIM (BACTRIM DS) 800-160 MG PER TABLET    Take 1 tablet by mouth 2 (two) times a day for 14 days smx-tmp DS (BACTRIM) 800-160 mg tabs (1tab q12 D10)       Start Date: 9/2/2024  End Date: 9/16/2024       Order Dose: 1 tablet       Quantity: 28 tablet    Refills: 0       No discharge procedures on file.    PDMP Review       None             ED Provider  Electronically Signed by             Sharad Liu MD  09/02/24 5140

## 2024-09-03 ENCOUNTER — TELEPHONE (OUTPATIENT)
Age: 69
End: 2024-09-03

## 2024-09-03 LAB
ATRIAL RATE: 63 BPM
P AXIS: 39 DEGREES
PR INTERVAL: 168 MS
QRS AXIS: -31 DEGREES
QRSD INTERVAL: 92 MS
QT INTERVAL: 408 MS
QTC INTERVAL: 417 MS
T WAVE AXIS: -19 DEGREES
VENTRICULAR RATE: 63 BPM

## 2024-09-03 PROCEDURE — 93010 ELECTROCARDIOGRAM REPORT: CPT | Performed by: INTERNAL MEDICINE

## 2024-09-03 NOTE — TELEPHONE ENCOUNTER
Hello,    Please advise if a forced appointment can be accommodated for the patient:    Call back #: Jerome    Insurance: medicare [employer]    Reason for appointment:  Septic knee//  Post Heart attach mid FEB. On heart medication    Requested doctor and/or location: Alex/[place]      Thank you.

## 2024-09-03 NOTE — TELEPHONE ENCOUNTER
Dr. Johnson can see the patient but ideally could be seen by somebody in [place] since he lives by the [facility] rather than having to drive to [facility].

## 2024-09-04 ENCOUNTER — OFFICE VISIT (OUTPATIENT)
Dept: OBGYN CLINIC | Facility: OTHER | Age: 69
End: 2024-09-04
Payer: MEDICARE

## 2024-09-04 ENCOUNTER — APPOINTMENT (OUTPATIENT)
Dept: RADIOLOGY | Facility: OTHER | Age: 69
End: 2024-09-04
Payer: MEDICARE

## 2024-09-04 VITALS
HEIGHT: 73 IN | HEART RATE: 71 BPM | DIASTOLIC BLOOD PRESSURE: 72 MMHG | WEIGHT: 245 LBS | BODY MASS INDEX: 32.47 KG/M2 | SYSTOLIC BLOOD PRESSURE: 111 MMHG

## 2024-09-04 DIAGNOSIS — Z01.89 ENCOUNTER FOR LOWER EXTREMITY COMPARISON IMAGING STUDY: ICD-10-CM

## 2024-09-04 DIAGNOSIS — M25.561 RIGHT KNEE PAIN, UNSPECIFIED CHRONICITY: Primary | ICD-10-CM

## 2024-09-04 DIAGNOSIS — M25.561 RIGHT KNEE PAIN, UNSPECIFIED CHRONICITY: ICD-10-CM

## 2024-09-04 PROCEDURE — 99213 OFFICE O/P EST LOW 20 MIN: CPT | Performed by: ORTHOPAEDIC SURGERY

## 2024-09-04 PROCEDURE — 73564 X-RAY EXAM KNEE 4 OR MORE: CPT

## 2024-09-04 NOTE — PROGRESS NOTES
"Orthopaedic Surgery - Office Note  Jerome Coffey (69 y.o. male)   : 1955   MRN: [id number]  Encounter Date: 2024    Assessment / Plan  Right leg cellulitis vs septic prepatellar bursitis     Continue with anti-biotics as instructed   Educated on the signs of infection and/or worsening symptoms, if they occur he understands he needs to return to the ED not the office for further care  WBAT  Ordered and reviewed XR during the visit   Reviewed notes from the ED  Follow-up:  Return if symptoms worsen or fail to improve.      Chief Complaint / Date of Onset  Right leg redness, warmth with decrease motion   Injury Mechanism / Date  None  Surgery / Date  None    History of Present Illness   Jerome Coffey is a 69 y.o. male who presents for evaluation of right septic prepatellar bursitis. He presented to the ED on 2024 for right knee and swelling. He was previously prescribed prednisone and was instructed to stop that and begin Bactrim. He presents today after taking 3 doses of Bactrim. He has not yet noticed any changes in redness or warmth. He has pain when attempts to WB in the leg. He states this all began on 2024 with no inciting event. He reports that he has history of both prepatellar bursitis and gout. Stating that he has never had gout to the knee before. Reports that he had recent flares of similar symptoms in his opposite knee and elbow.     Treatment Summary  Medications / Modalities  Began Bactrim on 2024   Bracing / Immobilization  None  Physical Therapy  None  Injections  None  Prior Surgeries  None  Other Treatments  None    Employment / Current Status  N/A    Sport / Organization / Current Status  N/A      Review of Systems  Pertinent items are noted in HPI.  All other systems were reviewed and are negative.      Physical Exam  /72   Pulse 71   Ht 6' 1\" (1.854 m)   Wt 111 kg (245 lb)   BMI 32.32 kg/m²   Cons: Appears well.  No apparent distress.  Psych: Alert. " Oriented x3.  Mood and affect normal.  Eyes: PERRLA, EOMI  Resp: Normal effort.  No audible wheezing or stridor.  CV: Palpable pulse.  No discernable arrhythmia.  No LE edema.  Lymph:  No palpable cervical, axillary, or inguinal lymphadenopathy.  Skin: Warm.  No palpable masses.  No visible lesions.  Neuro: Normal muscle tone.  Normal and symmetric DTR's.     Right Knee Exam  Alignment:  Normal knee alignment.  Inspection:  No edema. moderate erythema. No ecchymosis.  Palpation:  No tenderness. No effusion.  ROM:  Knee Extension 0. Knee Flexion 60.  Strength:  Not tested.  Stability:  Not tested.  Tests:   Not tested  Patella:  Not tested.  Neurovascular:  Sensation intact in DP/SP/Lopez/Sa/T nerve distributions.  2+ DP & PT pulses.  Gait:   patient has increased pain with WB, presents today in a wheelchair         Studies Reviewed  I have personally reviewed pertinent films in PACS.  XR right knee- images from 09/04/2024 showing no abnormalities or fractures       Procedures  No procedures today.    Medical, Surgical, Family, and Social History  The patient's medical history, family history, and social history, were reviewed and updated as appropriate.    Past Medical History:   Diagnosis Date    Erectile dysfunction     Knee pain, right     Renal calculi        Past Surgical History:   Procedure Laterality Date    CARDIAC CATHETERIZATION N/A 2/19/2024    Procedure: Cardiac pci;  Surgeon: Tl Dominique MD;  Location: [facility];  Service: Cardiology       History reviewed. No pertinent family history.    Social History     Occupational History    Not on file   Tobacco Use    Smoking status: Never    Smokeless tobacco: Never   Vaping Use    Vaping status: Never Used   Substance and Sexual Activity    Alcohol use: Yes     Comment: social drinker    Drug use: Never    Sexual activity: Not on file       Allergies   Allergen Reactions    Clindamycin GI Intolerance         Current Outpatient Medications:      allopurinol (ZYLOPRIM) 100 mg tablet, Take 100 mg by mouth daily, Disp: , Rfl:     atorvastatin (LIPITOR) 80 mg tablet, Take 1 tablet (80 mg total) by mouth daily with dinner, Disp: 90 tablet, Rfl: 3    Boswellia-Glucosamine-Vit D (Osteo Bi-Flex One Per Day) TABS, Take by mouth, Disp: , Rfl:     colchicine (COLCRYS) 0.6 mg tablet, , Disp: , Rfl:     colchicine (COLCRYS) 0.6 mg tablet, One tid prn gout, Disp: 21 tablet, Rfl: 0    losartan (COZAAR) 25 mg tablet, Take 1 tablet (25 mg total) by mouth daily, Disp: 90 tablet, Rfl: 3    metoprolol tartrate (LOPRESSOR) 25 mg tablet, Take 0.5 tablets (12.5 mg total) by mouth every 12 (twelve) hours, Disp: 90 tablet, Rfl: 3    Multiple Vitamins-Minerals (MENS MULTIVITAMIN PO), , Disp: , Rfl:     Omega-3 Fatty Acids (Fish Oil Omega-3) 1000 MG CAPS, Take 2 g by mouth daily, Disp: , Rfl:     sulfamethoxazole-trimethoprim (BACTRIM DS) 800-160 mg per tablet, Take 1 tablet by mouth 2 (two) times a day for 14 days smx-tmp DS (BACTRIM) 800-160 mg tabs (1tab q12 D10), Disp: 28 tablet, Rfl: 0    ticagrelor (BRILINTA) 90 MG, Take 1 tablet (90 mg total) by mouth every 12 (twelve) hours, Disp: 60 tablet, Rfl: 3    aspirin 81 mg chewable tablet, Chew 1 tablet (81 mg total) daily, Disp: 60 tablet, Rfl: 0    Ergocalciferol (VITAMIN D2 PO), Take by mouth (Patient not taking: Reported on 4/2/2024), Disp: , Rfl:     Glucosamine 750 MG TABS, Take 2 tablets by mouth daily (Patient not taking: Reported on 4/2/2024), Disp: , Rfl:     nitroglycerin (NITROSTAT) 0.4 mg SL tablet, Place 1 tablet (0.4 mg total) under the tongue every 5 (five) minutes as needed for chest pain, Disp: 100 tablet, Rfl: 0    predniSONE 10 mg tablet, Take 6 tablets on day 1; 5 on day 2; 4 on day 3; 3 on day 4; 2 on day 5; 1 on day 6. Take with food. (Patient not taking: Reported on 9/4/2024), Disp: 21 tablet, Rfl: 0    predniSONE 10 mg tablet, Take 4 tablets (40 mg total) by mouth daily for 2 days, THEN 3 tablets (30 mg  total) daily for 1 day, THEN 2 tablets (20 mg total) daily for 1 day, THEN 1 tablet (10 mg total) daily for 1 day. (Patient not taking: Reported on 9/4/2024), Disp: 14 tablet, Rfl: 0      Archana Tang    Scribe Attestation      I,:   am acting as a scribe while in the presence of the attending physician.:       I,:   personally performed the services described in this documentation    as scribed in my presence.:

## 2024-09-11 ENCOUNTER — TELEPHONE (OUTPATIENT)
Age: 69
End: 2024-09-11

## 2024-09-11 ENCOUNTER — OFFICE VISIT (OUTPATIENT)
Dept: PODIATRY | Facility: CLINIC | Age: 69
End: 2024-09-11
Payer: MEDICARE

## 2024-09-11 VITALS — HEIGHT: 73 IN | WEIGHT: 241 LBS | RESPIRATION RATE: 18 BRPM | BODY MASS INDEX: 31.94 KG/M2

## 2024-09-11 DIAGNOSIS — M76.62 ACHILLES TENDINITIS OF LEFT LOWER EXTREMITY: ICD-10-CM

## 2024-09-11 DIAGNOSIS — L03.119 CELLULITIS OF FOOT: Primary | ICD-10-CM

## 2024-09-11 DIAGNOSIS — B35.3 TINEA PEDIS OF BOTH FEET: ICD-10-CM

## 2024-09-11 DIAGNOSIS — B35.1 ONYCHOMYCOSIS: ICD-10-CM

## 2024-09-11 DIAGNOSIS — L03.115 CELLULITIS OF RIGHT LOWER EXTREMITY: ICD-10-CM

## 2024-09-11 PROCEDURE — 99204 OFFICE O/P NEW MOD 45 MIN: CPT | Performed by: PODIATRIST

## 2024-09-11 RX ORDER — METHYLPREDNISOLONE 4 MG
TABLET, DOSE PACK ORAL
Qty: 21 TABLET | Refills: 0 | Status: SHIPPED | OUTPATIENT
Start: 2024-09-11

## 2024-09-11 NOTE — PROGRESS NOTES
Ambulatory Visit  Name: Jerome Coffey      : 1955      MRN: [id number]  Encounter Provider: Parmjit Hankins DPM  Encounter Date: 2024   Encounter department: [facility]    Explained to patient that after discussing his history, he has likely been dealing with cellulitis of the right foot and leg that has been responding to the Bactrim DS.  I do not believe that he had a gout attack as he did not respond to prednisone.    Unable to determine exactly why an infection could have recurred but he does have chronic tinea and notes that he was in brackish water relatively recently before infection began.    This infection appears to be resolving and it is likely the pain will also resolve.  He still has 4 days of Bactrim DS remaining and he was advised to complete prescription.  Once this prescription is completed, Medrol Dosepak dispensed to be used to relieve Achilles tendon pain left heel.  Patient will be reassessed in 3 weeks.  At that time we will consider medications for the fungal infections.    Assessment & Plan  Cellulitis of foot         Achilles tendinitis of left lower extremity    Orders:    methylPREDNISolone 4 MG tablet therapy pack; Use as directed on package    Cellulitis of right lower extremity         Onychomycosis         Tinea pedis of both feet           History of Present Illness     Jerome Coffey is a 69 y.o. male who presents for pedal assessment.  The patient relates that on approximately  he had significant swelling and discomfort in the right foot and right lower leg.  The patient notes that he has a history of gout and currently takes allopurinol 100 mg twice daily and colchicine should he have a flare.    The patient was seen by his medical doctor and placed on oral prednisone.  After a few days and this having no effect, he was placed on Bactrim DS for 10 days.  He did see improvement with this and at the current time the right foot and leg edema  "has decreased significantly.  There is only mild pain in the area of the instep.    Patient does have pain at the Achilles insertion left heel.  There is a bone prominence noted and patient states a history of calcaneal spur.    It is also noted the patient has thickened yellow toenails and dry scaly skin on the soles of his feet.    I personally reviewed x-rays of the right foot taken on August 28, 2024.  They are positive for degenerative changes at the first metatarsophalangeal.  Also there is spurring at the Achilles insertion and an inferior heel spur.      Review of Systems   Cardiovascular:         Coronary artery disease   Musculoskeletal:  Positive for gait problem.   Psychiatric/Behavioral: Negative.             Objective         Resp 18   Ht 6' 1\" (1.854 m)   Wt 109 kg (241 lb)   BMI 31.80 kg/m²     Physical Exam  Constitutional:       Appearance: Normal appearance.   Cardiovascular:      Pulses: Normal pulses.   Musculoskeletal:         General: Tenderness and deformity present.      Comments: Pain with palpation left Achilles tendon at its insertion in the calcaneus.  Prominent bone prominence noted.    No significant edema or erythema right foot.  Slight discomfort with palpation of right instep.   Skin:     Findings: Rash present.      Comments: All toenails are thick and yellow with subungual debris.  Rash present on soles of feet consistent with chronic tinea pedis.   Neurological:      General: No focal deficit present.      Mental Status: He is oriented to person, place, and time.             "

## 2024-09-11 NOTE — TELEPHONE ENCOUNTER
Caller: Jerome     Doctor and/or Office: Dr Hankins/ [place]    #: [phone]     Escalation: Appointment Patient called and stated  wants to see him the first 10/2  he said he did not get a appt when he left   Please advise thank you.

## 2024-10-02 ENCOUNTER — OFFICE VISIT (OUTPATIENT)
Dept: PODIATRY | Facility: CLINIC | Age: 69
End: 2024-10-02
Payer: MEDICARE

## 2024-10-02 ENCOUNTER — HOSPITAL ENCOUNTER (OUTPATIENT)
Dept: NON INVASIVE DIAGNOSTICS | Facility: HOSPITAL | Age: 69
Discharge: HOME | End: 2024-10-02
Payer: MEDICARE

## 2024-10-02 VITALS
BODY MASS INDEX: 31.81 KG/M2 | SYSTOLIC BLOOD PRESSURE: 127 MMHG | HEART RATE: 58 BPM | HEIGHT: 73 IN | DIASTOLIC BLOOD PRESSURE: 67 MMHG | WEIGHT: 240 LBS

## 2024-10-02 VITALS — BODY MASS INDEX: 31.66 KG/M2 | WEIGHT: 240 LBS

## 2024-10-02 DIAGNOSIS — I25.10 CORONARY ARTERY DISEASE INVOLVING NATIVE CORONARY ARTERY OF NATIVE HEART WITHOUT ANGINA PECTORIS: ICD-10-CM

## 2024-10-02 DIAGNOSIS — B35.3 TINEA PEDIS OF BOTH FEET: ICD-10-CM

## 2024-10-02 DIAGNOSIS — I27.21 PAH (PULMONARY ARTERY HYPERTENSION) (HCC): ICD-10-CM

## 2024-10-02 DIAGNOSIS — B35.1 ONYCHOMYCOSIS: Primary | ICD-10-CM

## 2024-10-02 LAB
AORTIC VALVE MEAN VELOCITY: 8.2 M/S
APICAL FOUR CHAMBER EJECTION FRACTION: 58 %
AV LVOT MEAN GRADIENT: 2 MMHG
AV LVOT PEAK GRADIENT: 3 MMHG
AV MEAN GRADIENT: 3 MMHG
AV PEAK GRADIENT: 5 MMHG
AV VELOCITY RATIO: 0.74
BSA FOR ECHO PROCEDURE: 2.33 M2
DOP CALC AO PEAK VEL: 1.17 M/S
DOP CALC AO VTI: 25.34 CM
DOP CALC LVOT PEAK VEL VTI: 19.3 CM
DOP CALC LVOT PEAK VEL: 0.87 M/S
E WAVE DECELERATION TIME: 297 MS
E/A RATIO: 0.92
FRACTIONAL SHORTENING: 38 (ref 28–44)
INTERVENTRICULAR SEPTUM IN DIASTOLE (PARASTERNAL SHORT AXIS VIEW): 1.6 CM
INTERVENTRICULAR SEPTUM: 1.6 CM (ref 0.6–1.1)
LEFT INTERNAL DIMENSION IN SYSTOLE: 2.9 CM (ref 2.1–4)
LEFT VENTRICLE DIASTOLIC VOLUME (MOD BIPLANE): 87 ML
LEFT VENTRICLE DIASTOLIC VOLUME INDEX (MOD BIPLANE): 37.3 ML/M2
LEFT VENTRICLE SYSTOLIC VOLUME (MOD BIPLANE): 34 ML
LEFT VENTRICLE SYSTOLIC VOLUME INDEX (MOD BIPLANE): 14.6 ML/M2
LEFT VENTRICULAR INTERNAL DIMENSION IN DIASTOLE: 4.7 CM (ref 3.5–6)
LEFT VENTRICULAR POSTERIOR WALL IN END DIASTOLE: 1 CM
LEFT VENTRICULAR STROKE VOLUME: 71 ML
LV EF: 61 %
LVSV (TEICH): 71 ML
MV E'TISSUE VEL-LAT: 13 CM/S
MV E'TISSUE VEL-SEP: 6 CM/S
MV PEAK A VEL: 0.52 M/S
MV PEAK E VEL: 48 CM/S
SL CV LV EF: 55
SL CV PED ECHO LEFT VENTRICLE DIASTOLIC VOLUME (MOD BIPLANE) 2D: 103 ML
SL CV PED ECHO LEFT VENTRICLE SYSTOLIC VOLUME (MOD BIPLANE) 2D: 32 ML

## 2024-10-02 PROCEDURE — 93325 DOPPLER ECHO COLOR FLOW MAPG: CPT

## 2024-10-02 PROCEDURE — 93321 DOPPLER ECHO F-UP/LMTD STD: CPT

## 2024-10-02 PROCEDURE — 99213 OFFICE O/P EST LOW 20 MIN: CPT | Performed by: PODIATRIST

## 2024-10-02 PROCEDURE — 93308 TTE F-UP OR LMTD: CPT

## 2024-10-02 RX ORDER — PRENATAL VIT 91/IRON/FOLIC/DHA 28-975-200
COMBINATION PACKAGE (EA) ORAL DAILY
Qty: 14 G | Refills: 0 | Status: SHIPPED | OUTPATIENT
Start: 2024-10-02 | End: 2024-10-16

## 2024-10-02 NOTE — PROGRESS NOTES
Patient presents for pedal assessment.  The cellulitis affecting the right lower extremity has resolved.  Patient also has no left Achilles tendon pain at this time.    It is thought that the cellulitis of the right leg is due to the chronic tinea pedis that he is dealing with on the soles of his feet.  He also has multiple mycotic toenails affecting both feet.    Discussed treatment options for both tinea pedis and onychomycosis.  Patient states that he has had the nail fungal infection for years and prefers to only concentrate on the skin rash.    It is noted that his liver enzymes as of 8/624 were within normal limits.    Prescribed Lamisil tablets 250 mg for 14 days.  Also recommended topical use of Lotrimin AF or Lamisil cream.  It is anticipated that the tinea infection will resolve over 1 month.  He should also utilize the topical creams as a prophylactic once the infection has resolved.  Reappoint as needed

## 2024-10-07 ENCOUNTER — TELEPHONE (OUTPATIENT)
Age: 69
End: 2024-10-07

## 2024-10-07 NOTE — TELEPHONE ENCOUNTER
"Spoke with patient, inquiring about ECHO results from last week. Also asking if the medications he is on currently will be \"life long\".    Reviewed chart for ECHO results, relayed the interpretation summary but advised need provider to review for more accurate information. Verbalized understanding.    Please advise on ECHO results.    Jerome can be reached at [phone]  "

## 2024-12-31 DIAGNOSIS — I21.3 STEMI (ST ELEVATION MYOCARDIAL INFARCTION) (HCC): ICD-10-CM

## 2024-12-31 RX ORDER — ATORVASTATIN CALCIUM 80 MG/1
80 TABLET, FILM COATED ORAL
Qty: 90 TABLET | Refills: 1 | Status: SHIPPED | OUTPATIENT
Start: 2024-12-31

## 2025-01-22 ENCOUNTER — OFFICE VISIT (OUTPATIENT)
Dept: CARDIOLOGY CLINIC | Facility: CLINIC | Age: 70
End: 2025-01-22
Payer: MEDICARE

## 2025-01-22 VITALS
HEART RATE: 56 BPM | BODY MASS INDEX: 33.8 KG/M2 | SYSTOLIC BLOOD PRESSURE: 130 MMHG | WEIGHT: 255 LBS | HEIGHT: 73 IN | DIASTOLIC BLOOD PRESSURE: 84 MMHG | OXYGEN SATURATION: 99 %

## 2025-01-22 DIAGNOSIS — I21.3 STEMI (ST ELEVATION MYOCARDIAL INFARCTION) (HCC): ICD-10-CM

## 2025-01-22 DIAGNOSIS — I25.10 CORONARY ARTERY DISEASE INVOLVING NATIVE CORONARY ARTERY OF NATIVE HEART WITHOUT ANGINA PECTORIS: Primary | ICD-10-CM

## 2025-01-22 DIAGNOSIS — I10 ESSENTIAL HYPERTENSION, BENIGN: ICD-10-CM

## 2025-01-22 DIAGNOSIS — E78.2 MIXED HYPERLIPIDEMIA: ICD-10-CM

## 2025-01-22 DIAGNOSIS — I77.810 AORTIC ECTASIA, THORACIC (HCC): ICD-10-CM

## 2025-01-22 PROBLEM — I27.21 PAH (PULMONARY ARTERY HYPERTENSION) (HCC): Status: RESOLVED | Noted: 2024-02-20 | Resolved: 2025-01-22

## 2025-01-22 PROCEDURE — G2211 COMPLEX E/M VISIT ADD ON: HCPCS | Performed by: INTERNAL MEDICINE

## 2025-01-22 PROCEDURE — 99214 OFFICE O/P EST MOD 30 MIN: CPT | Performed by: INTERNAL MEDICINE

## 2025-01-22 RX ORDER — METAXALONE 800 MG/1
800 TABLET ORAL 3 TIMES DAILY PRN
COMMUNITY
Start: 2024-04-08 | End: 2025-04-08

## 2025-01-22 RX ORDER — NITROGLYCERIN 0.4 MG/1
0.4 TABLET SUBLINGUAL
Qty: 25 TABLET | Refills: 3 | Status: SHIPPED | OUTPATIENT
Start: 2025-01-22 | End: 2025-02-21

## 2025-01-22 RX ORDER — NITROGLYCERIN 0.4 MG/1
0.4 TABLET SUBLINGUAL
Qty: 100 TABLET | Refills: 0 | Status: SHIPPED | OUTPATIENT
Start: 2025-01-22 | End: 2025-01-22 | Stop reason: SDUPTHER

## 2025-01-22 NOTE — ASSESSMENT & PLAN NOTE
Mr. Jerome Coffey has been overall doing well with no recent anginal-like symptoms.  Blood pressure is reasonably controlled.  He has not had any symptoms.  Physical examination is significant for increased BMI.  His lipids are very well-controlled on recent testing.  He is on good medical regimen.  His echocardiogram from October demonstrated preserved left ventricular systolic function and mild valvular abnormalities.  He is advised to continue current medications.  He is advised to discontinue using Brilinta towards the end of February.  He should continue aspirin indefinitely.  We will continue the current dose of statin and other medications.  Advising him to follow-up with primary care physician once 3 to 6 months.  Cardiology follow-up will be made for 8 months and then subsequently once a year.  Advise continuing physical activity and healthy eating to control weight.  He has no contraindication to use performance-enhancing medication like Viagra or Cialis.  He is advised to avoid taking his blood pressure pills within 1 to 2 hours of using the medication before or after.  He is advised to not take any sublingual nitroglycerin pill 4 hours before or after using the medication.                    WORKPLACE STRATEGIES TO INCREASE ACTIVITY THROUGHOUT THE WORKDAY:  ?Park your car farther from the workplace and walk.  ?Use standing or walking desks to reduce sitting time, which is associated with increased morbidity and mortality.  ?Try to leave your desk regularly (eg, every 30 to 60 minutes).  ?Replace e-mails or phone calls with personal visits.  ?Hold standing or walking meetings, instead of sitting.  ?Use a workday walking routine - To start the day, park your car further away from your place of work and walk 10 minutes to your worksite. At lunchtime, walk 5 minutes away from work and 5 minutes back before eating your lunch. At the end of the day, take that same 10-minute route to walk back to your car. You  will now have completed your recommended daily exercise.  ?Exercise on weekends - If you simply cannot exercise during the week, do it on the weekends. Taking 75-minute walks on Saturday and on Sunday appears to provide similar health benefits to doing the same total amount of walking during the week. However, it is prudent to build up to the 75-minute walks gradually in order to avoid overuse injuries of the lower extremities and other problems (eg, friction blisters). Perhaps start with 30-minute walks and each successive weekend add five minutes to each walk until you reach 75 minutes.  ?Increase workout intensity - You get the same benefit in half the time by performing vigorous as opposed to moderate-intensity exercise. As an example, if you jog for 25 minutes three days each week, you reap the same benefits as walking for 30 minutes five days each week.  ?Find a partner - Find someone to exercise with or join a group exercise program. This makes exercising more social and fun and increases the likelihood that you will continue. Joining a group of like-minded exercisers (such as a walking group or tennis [place]) or a gym can provide necessary encouragement and support. However, such a strategy may be more costly and limit your exercise options. Adopting a dog helps some people exercise more regularly [.  ?Exercise at home - For some, a home exercise program is just what they need to comply with an exercise prescription. When time is short, being able to use a home exercise bicycle or treadmill without driving to the gym is a great solution. However, quality equipment can be expensive and oftentimes the initial enthusiasm for home equipment fades and exercise declines.  ?Use a DVD or internet-based fitness program ? Another approach to helping patients perform regular aerobic exercise is using a DVD or internet fitness program. Such programs range from yoga to aerobics to resistance exercise circuit training.  However, participants should be sure the program selected is appropriate for their fitness level and goals, and is well designed with proper instruction about exercise technique.  ?Join a gym or work with a  ? Joining a local gym or working with a qualified  can be extremely helpful for some who are struggling to adhere to an exercise program. Several professional associations, including the ACSM, offer instruction and proficiency standards and competency certification. A knowledgeable  can help you create an exercise regimen that best fits your functional status and goals, while providing guidance on proper technique to help you avoid injury. A gym or health club can not only provide a safe and inviting place to exercise, it can also provide opportunities for socialization that make exercising more enjoyable, leading to enhanced adherence.

## 2025-01-22 NOTE — ASSESSMENT & PLAN NOTE
Orders:    nitroglycerin (NITROSTAT) 0.4 mg SL tablet; Place 1 tablet (0.4 mg total) under the tongue every 5 (five) minutes as needed for chest pain

## 2025-01-22 NOTE — PROGRESS NOTES
Name: Jerome Coffey      : 1955      MRN: [id number]  Encounter Provider: Reji Baum MD  Encounter Date: 2025   Encounter department: [facility]    DIAGNOSES:  1. Coronary artery disease -- type I MI, Inferior ST elevation type, status post PCI of right coronary artery 2024  2. Dyslipidemia  3. Moderate pulmonary hypertension  4. Mild ectasia of ascending aorta up  5. Degenerative joint disease,  Multilevel lumbar spondylosis with spondylolisthesis and associated spondylolysis at L5-S1.   6. History of gout  7. Ectasia of ascending aorta.    ECHOCARDIOGRAM 2024:  Normal left ventricle size, moderate concentric left ventricle hypertrophy, normal left ventricular systolic function, EF 65%, mild hypokinesis of the base of inferior wall.  Normal diastolic function.  Mildly dilated right ventricle with normal right ventricular systolic function.  Normal left atrial size, mildly dilated right atrium.  Normal aortic valve, no aortic valve stenosis or regurgitation.  Normal mitral valve, mild mitral valve regurgitation.  Mild tricuspid valve regurgitation.  No pulmonic valve regurgitation.  Suspected moderate pulmonary hypertension.  Estimated RVSP/history 54 mmHg.  No pericardial effusion.  Dilated aortic root and ascending aorta measuring up to 3.9 cm.    LIMITED ECHOCARDIOGRAM 10/2/2024: Moderate concentric LVH, normal left ventricular systolic function EF 55%, grade 1 diastolic dysfunction -- normal RV size and function -- left and right atria were not well-visualized -- normal trileaflet aortic valve without stenosis or regurgitation -- normal mitral valve trace mitral valve regurgitation -- no tricuspid valve regurgitation -- mild pulmonic valve regurgitation -- no obvious pulmonary hypertension -- no pericardial effusion.    CARDIAC CATHETERIZATION 2024  Left main: Large vessel, minimal luminal irregularities.  LAD: Large vessel. No significant disease  reported.  LCx: Large, nondominant.  Mild diffuse disease 4%.  RCA: 100% mid RCA stenosis with MEHREEN 0 flow with thrombotic lesion.  Successful balloon angioplasty and stent placement with 0% residual stenosis.    Assessment & Plan  Coronary artery disease involving native coronary artery of native heart without angina pectoris  Mr. Jerome Coffey has been overall doing well with no recent anginal-like symptoms.  Blood pressure is reasonably controlled.  He has not had any symptoms.  Physical examination is significant for increased BMI.  His lipids are very well-controlled on recent testing.  He is on good medical regimen.  His echocardiogram from October demonstrated preserved left ventricular systolic function and mild valvular abnormalities.  He is advised to continue current medications.  He is advised to discontinue using Brilinta towards the end of February.  He should continue aspirin indefinitely.  We will continue the current dose of statin and other medications.  Advising him to follow-up with primary care physician once 3 to 6 months.  Cardiology follow-up will be made for 8 months and then subsequently once a year.  Advise continuing physical activity and healthy eating to control weight.  He has no contraindication to use performance-enhancing medication like Viagra or Cialis.  He is advised to avoid taking his blood pressure pills within 1 to 2 hours of using the medication before or after.  He is advised to not take any sublingual nitroglycerin pill 4 hours before or after using the medication.                    WORKPLACE STRATEGIES TO INCREASE ACTIVITY THROUGHOUT THE WORKDAY:  ?Park your car farther from the workplace and walk.  ?Use standing or walking desks to reduce sitting time, which is associated with increased morbidity and mortality.  ?Try to leave your desk regularly (eg, every 30 to 60 minutes).  ?Replace e-mails or phone calls with personal visits.  ?Hold standing or walking meetings,  instead of sitting.  ?Use a workday walking routine - To start the day, park your car further away from your place of work and walk 10 minutes to your worksite. At lunchtime, walk 5 minutes away from work and 5 minutes back before eating your lunch. At the end of the day, take that same 10-minute route to walk back to your car. You will now have completed your recommended daily exercise.  ?Exercise on weekends - If you simply cannot exercise during the week, do it on the weekends. Taking 75-minute walks on Saturday and on Sunday appears to provide similar health benefits to doing the same total amount of walking during the week. However, it is prudent to build up to the 75-minute walks gradually in order to avoid overuse injuries of the lower extremities and other problems (eg, friction blisters). Perhaps start with 30-minute walks and each successive weekend add five minutes to each walk until you reach 75 minutes.  ?Increase workout intensity - You get the same benefit in half the time by performing vigorous as opposed to moderate-intensity exercise. As an example, if you jog for 25 minutes three days each week, you reap the same benefits as walking for 30 minutes five days each week.  ?Find a partner - Find someone to exercise with or join a group exercise program. This makes exercising more social and fun and increases the likelihood that you will continue. Joining a group of like-minded exercisers (such as a walking group or tennis [place]) or a gym can provide necessary encouragement and support. However, such a strategy may be more costly and limit your exercise options. Adopting a dog helps some people exercise more regularly [.  ?Exercise at home - For some, a home exercise program is just what they need to comply with an exercise prescription. When time is short, being able to use a home exercise bicycle or treadmill without driving to the gym is a great solution. However, quality equipment can be expensive  and oftentimes the initial enthusiasm for home equipment fades and exercise declines.  ?Use a DVD or internet-based fitness program ? Another approach to helping patients perform regular aerobic exercise is using a DVD or internet fitness program. Such programs range from yoga to aerobics to resistance exercise circuit training. However, participants should be sure the program selected is appropriate for their fitness level and goals, and is well designed with proper instruction about exercise technique.  ?Join a gym or work with a  ? Joining a local gym or working with a qualified  can be extremely helpful for some who are struggling to adhere to an exercise program. Several professional associations, including the ACSM, offer instruction and proficiency standards and competency certification. A knowledgeable  can help you create an exercise regimen that best fits your functional status and goals, while providing guidance on proper technique to help you avoid injury. A gym or health club can not only provide a safe and inviting place to exercise, it can also provide opportunities for socialization that make exercising more enjoyable, leading to enhanced adherence.           Mixed hyperlipidemia  Pressure well-controlled.  He is advised to continue current medications.       Essential hypertension, benign  Blood pressure today is overall satisfactory.  He is on good medical regimen.  Goal is to keep systolic blood pressures at or less than 130 mmHg and diastolic blood pressures at or less than 80 mmHg.       STEMI (ST elevation myocardial infarction) ([facility])    Orders:    nitroglycerin (NITROSTAT) 0.4 mg SL tablet; Place 1 tablet (0.4 mg total) under the tongue every 5 (five) minutes as needed for chest pain    Aortic ectasia, thoracic (HCC)  Borderline dilated aortic root noted on echocardiogram in February 2024.  This may be within normal limit for his height and body  habitus.  Would not do a CT scan of the chest at this time.  Would aim to control blood pressure.           History of Present Illness   HPI  Jerome Coffey is a 69 y.o. male who presents regarding follow-up of his history of coronary artery disease, hypertension, dyslipidemia and possible pulmonary hypertension.  He was last seen by me in April 2024.  He is here for follow-up accompanied with his wife.    History obtained from: patient    He mentions that since last visit he had couple of emergency room visits.  He went to emergency room in May and was diagnosed with diverticulitis.  More recently he went to emergency room  on August 31 after having a fall and suffering from a ankle and knee issue.  He developed infection which spread to his left knee.  He completed a course of antibiotic and since then it has healed.  From a cardiac perspective he denies any recent unusual chest pain or shortness of breath or palpitations.  Denies any passing out or near passing out episodes.  Remains active and is going to gym regularly.    Functional capacity status: Good   (Excellent- >10 METs; Good: (7-10 METs); Moderate (4-7 METs); Poor (<= 4 METs)     Any chronic stressors: None   (feeling of poor health, financial problems, and social isolation etc).     Tobacco or alcohol dependence: He has never been a smoker.  Reports drinking alcohol very rarely.     Current cardiac medications: Atorvastatin 80 mg daily losartan 25 mg daily metoprolol succinate 12.5 mg twice daily aspirin 81 mg daily ticagrelor 90 mg twice daily omega-3 fatty acids    Last recent comprehensive blood work available:   Blood work 1/16/2025 sodium 143 potassium 4.4 chloride 106 bicarb 26 BUN 21 creatinine 1.1 GFR 73  Normal liver function test  Hemoglobin A1c 6.1  Total cholesterol 123 triglyceride 219 HDL 31 calculated LDL 48  Hemoglobin 13.9 hematocrit 40.6 platelet count 180  TSH 1.59    ECG: No results found for this visit on 01/22/25.    REVIEW OF  SYSTEMS   Positive for: As noted above in HPI  Negative for: All remaining as reviewed below and in HPI.    SYSTEM SYMPTOMS REVIEWED:  General--weight change, fever, night sweats  Respiratory--cough, wheezing, shortness of breath, sputum production  Cardiovascular--chest pain, syncope, dyspnea on exertion, edema, decline in exercise tolerance, claudication   Gastrointestinal--persistent vomiting, diarrhea, abdominal distention, blood in stool   Muscular or skeletal--joint pain or swelling   Neurologic--headaches, syncope, abnormal movement  Hematologic--history of easy bruising and bleeding   Endocrine--thyroid enlargement, heat or cold intolerance, polyuria   Psychiatric--anxiety, depression     CURRENT MEDICATIONS LIST     Current Outpatient Medications:     allopurinol (ZYLOPRIM) 100 mg tablet, Take 100 mg by mouth daily, Disp: , Rfl:     aspirin 81 mg chewable tablet, Chew 1 tablet (81 mg total) daily, Disp: 60 tablet, Rfl: 0    atorvastatin (LIPITOR) 80 mg tablet, TAKE 1 TABLET BY MOUTH DAILY WITH DINNER, Disp: 90 tablet, Rfl: 1    Boswellia-Glucosamine-Vit D (Osteo Bi-Flex One Per Day) TABS, Take by mouth, Disp: , Rfl:     colchicine (COLCRYS) 0.6 mg tablet, One tid prn gout, Disp: 21 tablet, Rfl: 0    losartan (COZAAR) 25 mg tablet, Take 1 tablet (25 mg total) by mouth daily, Disp: 90 tablet, Rfl: 3    metaxalone (SKELAXIN) 800 mg tablet, Take 800 mg by mouth Three times daily as needed, Disp: , Rfl:     metoprolol tartrate (LOPRESSOR) 25 mg tablet, Take 0.5 tablets (12.5 mg total) by mouth every 12 (twelve) hours, Disp: 90 tablet, Rfl: 3    Multiple Vitamins-Minerals (MENS MULTIVITAMIN PO), , Disp: , Rfl:     nitroglycerin (NITROSTAT) 0.4 mg SL tablet, Place 1 tablet (0.4 mg total) under the tongue every 5 (five) minutes as needed for chest pain, Disp: 100 tablet, Rfl: 0    Omega-3 Fatty Acids (Fish Oil Omega-3) 1000 MG CAPS, Take 2 g by mouth daily, Disp: , Rfl:     terbinafine (LamISIL) 1 % cream, Apply  "topically daily for 14 days (Patient taking differently: Apply topically if needed), Disp: 14 g, Rfl: 0    ticagrelor (BRILINTA) 90 MG, Take 1 tablet (90 mg total) by mouth every 12 (twelve) hours, Disp: 60 tablet, Rfl: 3       ALLERGIES     Allergies   Allergen Reactions    Clindamycin GI Intolerance       *-*-*-*-*-*-*-*-*-*-*-*-*-*-*-*-*-*-*-*-*-*-*-*-*-*-*-*-*-*-*-*-*-*-*-*-*-*-*-*-*-*-*-*-*-*-*-*-*-*-*-*-*-*-         Objective   /84   Pulse 56   Ht 6' 1\" (1.854 m)   Wt 116 kg (255 lb)   SpO2 99%   BMI 33.64 kg/m²      Physical Exam  Constitutional:       Appearance: Normal appearance. He is obese.   HENT:      Head: Normocephalic.      Mouth/Throat:      Mouth: Mucous membranes are moist.   Cardiovascular:      Rate and Rhythm: Normal rate and regular rhythm.      Heart sounds: No murmur heard.  Pulmonary:      Effort: Pulmonary effort is normal.      Breath sounds: Normal breath sounds.   Abdominal:      Palpations: Abdomen is soft.   Musculoskeletal:      Cervical back: Neck supple.      Right lower leg: No edema.      Left lower leg: No edema.   Skin:     General: Skin is dry.   Neurological:      Mental Status: He is oriented to person, place, and time. Mental status is at baseline.   Psychiatric:         Behavior: Behavior normal.         Thought Content: Thought content normal.           "

## 2025-01-22 NOTE — ASSESSMENT & PLAN NOTE
Borderline dilated aortic root noted on echocardiogram in February 2024.  This may be within normal limit for his height and body habitus.  Would not do a CT scan of the chest at this time.  Would aim to control blood pressure.

## 2025-01-22 NOTE — PATIENT INSTRUCTIONS
Assessment & Plan  Coronary artery disease involving native coronary artery of native heart without angina pectoris  Mr. Jerome Coffey has been overall doing well with no recent anginal-like symptoms.  Blood pressure is reasonably controlled.  He has not had any symptoms.  Physical examination is significant for increased BMI.  His lipids are very well-controlled on recent testing.  He is on good medical regimen.  His echocardiogram from October demonstrated preserved left ventricular systolic function and mild valvular abnormalities.  He is advised to continue current medications.  He is advised to discontinue using Brilinta towards the end of February.  He should continue aspirin indefinitely.  We will continue the current dose of statin and other medications.  Advising him to follow-up with primary care physician once 3 to 6 months.  Cardiology follow-up will be made for 8 months and then subsequently once a year.  Advise continuing physical activity and healthy eating to control weight.  He has no contraindication to use performance-enhancing medication like Viagra or Cialis.  He is advised to avoid taking his blood pressure pills within 1 to 2 hours of using the medication before or after.  He is advised to not take any sublingual nitroglycerin pill 4 hours before or after using the medication.                    WORKPLACE STRATEGIES TO INCREASE ACTIVITY THROUGHOUT THE WORKDAY:  ?Park your car farther from the workplace and walk.  ?Use standing or walking desks to reduce sitting time, which is associated with increased morbidity and mortality.  ?Try to leave your desk regularly (eg, every 30 to 60 minutes).  ?Replace e-mails or phone calls with personal visits.  ?Hold standing or walking meetings, instead of sitting.  ?Use a workday walking routine - To start the day, park your car further away from your place of work and walk 10 minutes to your worksite. At lunchtime, walk 5 minutes away from work and 5  minutes back before eating your lunch. At the end of the day, take that same 10-minute route to walk back to your car. You will now have completed your recommended daily exercise.  ?Exercise on weekends - If you simply cannot exercise during the week, do it on the weekends. Taking 75-minute walks on Saturday and on Sunday appears to provide similar health benefits to doing the same total amount of walking during the week. However, it is prudent to build up to the 75-minute walks gradually in order to avoid overuse injuries of the lower extremities and other problems (eg, friction blisters). Perhaps start with 30-minute walks and each successive weekend add five minutes to each walk until you reach 75 minutes.  ?Increase workout intensity - You get the same benefit in half the time by performing vigorous as opposed to moderate-intensity exercise. As an example, if you jog for 25 minutes three days each week, you reap the same benefits as walking for 30 minutes five days each week.  ?Find a partner - Find someone to exercise with or join a group exercise program. This makes exercising more social and fun and increases the likelihood that you will continue. Joining a group of like-minded exercisers (such as a walking group or tennis [place]) or a gym can provide necessary encouragement and support. However, such a strategy may be more costly and limit your exercise options. Adopting a dog helps some people exercise more regularly [.  ?Exercise at home - For some, a home exercise program is just what they need to comply with an exercise prescription. When time is short, being able to use a home exercise bicycle or treadmill without driving to the gym is a great solution. However, quality equipment can be expensive and oftentimes the initial enthusiasm for home equipment fades and exercise declines.  ?Use a DVD or internet-based fitness program ? Another approach to helping patients perform regular aerobic exercise is  using a DVD or internet fitness program. Such programs range from yoga to aerobics to resistance exercise circuit training. However, participants should be sure the program selected is appropriate for their fitness level and goals, and is well designed with proper instruction about exercise technique.  ?Join a gym or work with a  ? Joining a local gym or working with a qualified  can be extremely helpful for some who are struggling to adhere to an exercise program. Several professional associations, including the ACSM, offer instruction and proficiency standards and competency certification. A knowledgeable  can help you create an exercise regimen that best fits your functional status and goals, while providing guidance on proper technique to help you avoid injury. A gym or health club can not only provide a safe and inviting place to exercise, it can also provide opportunities for socialization that make exercising more enjoyable, leading to enhanced adherence.           Mixed hyperlipidemia  Pressure well-controlled.  He is advised to continue current medications.       Essential hypertension, benign  Blood pressure today is overall satisfactory.  He is on good medical regimen.  Goal is to keep systolic blood pressures at or less than 130 mmHg and diastolic blood pressures at or less than 80 mmHg.       STEMI (ST elevation myocardial infarction) (HCC)    Orders:    nitroglycerin (NITROSTAT) 0.4 mg SL tablet; Place 1 tablet (0.4 mg total) under the tongue every 5 (five) minutes as needed for chest pain    Aortic ectasia, thoracic (HCC)  Borderline dilated aortic root noted on echocardiogram in February 2024.  This may be within normal limit for his height and body habitus.  Would not do a CT scan of the chest at this time.  Would aim to control blood pressure.

## 2025-02-12 ENCOUNTER — TELEPHONE (OUTPATIENT)
Dept: CARDIOLOGY CLINIC | Facility: CLINIC | Age: 70
End: 2025-02-12

## 2025-02-12 NOTE — TELEPHONE ENCOUNTER
Return phone call to patient.  Advised that he can discontinue using Brilinta once his supplies ran out the next couple of weeks.  He is advised to continue aspirin therapy interrupted along with other medications.  Advised that he has no specific dietary restriction.  Regarding Advil and other NSAIDs advised that these medications have more detrimental effects so he should try avoiding them except occasionally.  Regarding CBD oil advised that though there is some data that these have some anti-inflammatory oxidative stress reducing properties have not been proven in long-term studies.  They do affect the nervous system with chronic use.  Recommend avoiding them unless necessary.    Advised to call us back if there are any other questions.  She will follow-up in office as scheduled.

## 2025-03-03 ENCOUNTER — OFFICE VISIT (OUTPATIENT)
Dept: URGENT CARE | Facility: CLINIC | Age: 70
End: 2025-03-03
Payer: MEDICARE

## 2025-03-03 VITALS
HEIGHT: 72 IN | SYSTOLIC BLOOD PRESSURE: 126 MMHG | HEART RATE: 90 BPM | RESPIRATION RATE: 18 BRPM | OXYGEN SATURATION: 96 % | WEIGHT: 245 LBS | BODY MASS INDEX: 33.18 KG/M2 | DIASTOLIC BLOOD PRESSURE: 84 MMHG | TEMPERATURE: 98.2 F

## 2025-03-03 DIAGNOSIS — J06.9 ACUTE URI: Primary | ICD-10-CM

## 2025-03-03 PROCEDURE — G0463 HOSPITAL OUTPT CLINIC VISIT: HCPCS

## 2025-03-03 PROCEDURE — 99213 OFFICE O/P EST LOW 20 MIN: CPT

## 2025-03-03 RX ORDER — METHYLPREDNISOLONE 4 MG/1
TABLET ORAL
Qty: 21 TABLET | Refills: 0 | Status: SHIPPED | OUTPATIENT
Start: 2025-03-03

## 2025-03-03 NOTE — PATIENT INSTRUCTIONS
"Patient Education     Upper respiratory infection in adults - Discharge instructions   The Basics   Written by the doctors and editors at [facility]   What are discharge instructions? -- Discharge instructions are information about how to take care of yourself after getting medical care for a health problem.  What is an upper respiratory infection? -- An upper respiratory infection (\"URI\") is an illness that can affect your nose, throat, ears, and sinuses. Almost all URIs are caused by a virus. The common cold is an example of a viral URI. Some URIs are caused by bacteria, but this is much less common.  URIs spread easily from person to person, most often through coughing or sneezing. A URI will almost always get better in a week or 2 without any treatment. Because most URIs are caused by viruses, antibiotics do not usually help.  If you do have a bacterial infection, your doctor might prescribe antibiotics.  How do I care for myself at home? -- Ask the doctor or nurse what you should do when you go home. Make sure that you understand exactly what you need to do to care for yourself. Ask questions if there is anything you do not understand.  You should also:   Wash your hands often (figure 1), and cough or sneeze into a tissue. If you do not have a tissue, cough or sneeze into your elbow instead of your hands.   Drink lots of fluids (water, juice, or broth) to stay hydrated, unless your doctor told you otherwise. This will help replace any fluids lost through runny nose or fever. Warm tea or soup can also help soothe a sore throat.   To help a stuffy nose and make it easier to breathe:   Use saline nose drops or spray.   Use a humidifier if the air in your home feels dry.   Follow the directions on the label carefully if you take over-the-counter cough or cold medicines. Do not take more than 1 medicine that contains acetaminophen. Also, if you have a heart problem or high blood pressure, check with your doctor before " you take any of these medicines.   Try to quit smoking if you smoke. Your doctor or nurse can help.  How can I prevent getting another URI? -- The best way to prevent a URI, or keep it from spreading to others, is to keep your hands clean. Wash your hands often with soap and water or alcohol gel rubs.  Some other ways to prevent the spread of infection include:   Always wash your hands with soap and water after you cough, sneeze, or blow your nose.   Clean surfaces and objects that you touch a lot. These include sinks, counters, tables, door handles, remotes, and phones. Use a bleach and water mixture. The germs that cause a URI can live on surfaces for at least 2 hours.   Do not share cups, food, towels, bed linens, or other personal items.   Stay away from other people when you are sick. When you do need to be around other people, consider wearing a face mask.  When should I call the doctor? -- Call for advice if:   You have a persistent fever of 100.4°F (38°C) or higher, chills, a very bad sore throat, or ear or sinus pain.   You get a new fever after several days of feeling the same or getting better.   You start having chest pain when you cough.   You have a cough that lasts more than 10 days.   You cough up blood.   You have any new or worsening symptoms, such as worsening cough or trouble breathing.  All topics are updated as new evidence becomes available and our peer review process is complete.  This topic retrieved from [employer] on: Mar 13, 2024.  Topic 795877 Version 1.0  Release: 32.2.4 - C32.71  © 2024 UpToDate, Inc. and/or its affiliates. All rights reserved.  figure 1: How to wash your hands     Wet your hands with clean water, and apply a small amount of soap. Lather and rub hands together for at least 20 seconds. Clean your wrists, palms, backs of your hands, between your fingers, tips of your fingers, thumbs, and under and around your nails. Rinse well, and dry your hands using a clean  more.  Graphic 562686 Version 7.0  Consumer Information Use and Disclaimer   Disclaimer: This generalized information is a limited summary of diagnosis, treatment, and/or medication information. It is not meant to be comprehensive and should be used as a tool to help the user understand and/or assess potential diagnostic and treatment options. It does NOT include all information about conditions, treatments, medications, side effects, or risks that may apply to a specific patient. It is not intended to be medical advice or a substitute for the medical advice, diagnosis, or treatment of a health care provider based on the health care provider's examination and assessment of a patient's specific and unique circumstances. Patients must speak with a health care provider for complete information about their health, medical questions, and treatment options, including any risks or benefits regarding use of medications. This information does not endorse any treatments or medications as safe, effective, or approved for treating a specific patient. UpToDate, Inc. and its affiliates disclaim any warranty or liability relating to this information or the use thereof.The use of this information is governed by the Terms of Use, available at https://www.[employer].com/en/know/clinical-effectiveness-terms. 2024© UpToDate, Inc. and its affiliates and/or licensors. All rights reserved.  Copyright   © 2024 UpToDate, Inc. and/or its affiliates. All rights reserved.

## 2025-03-03 NOTE — PROGRESS NOTES
"  [facility]        NAME: Jerome Coffey is a 69 y.o. male  : 1955    MRN: [id number]  DATE: March 3, 2025  TIME: 10:01 AM    Assessment and Plan   Acute URI [J06.9]  1. Acute URI  methylPREDNISolone 4 MG tablet therapy pack        Decongestants recommended for congestion. Hydration, humidifier, rest. No signs of bacterial infection today. Follow up with PCP in 3-5 days if no improvement. Proceed to ER if symptoms worsen.    Medical Decision Making     PROBLEM: 1 acute, uncomplicated illness or injury    DATA: Note(s) Reviewed: yes, chart review    RISK: Prescription drug management    TIME: 20 min     Chief Complaint     Chief Complaint   Patient presents with    Cold Like Symptoms     Congestion with productive cough. Fever (highest 100), states normal is around 96. Sore throat. Headaches. Home covid test on Saturday negative. OTC tylenol cold medicine not helping/      History of Present Illness     Jerome Coffey is a 69 y.o. male presenting to the office today for upper respiratory complaints.   Symptoms have been present for 4 days, and include congestion, cough, sore throat, headaches, chills. Neg for covid at home   He has tried Tylenol cold and flu for his symptoms, minimal relief.    Review of Systems     Review of Systems   Constitutional:  Positive for chills and fever.   HENT:  Positive for congestion. Negative for rhinorrhea and sore throat.    Respiratory:  Positive for cough. Negative for shortness of breath and wheezing.         Felt like his chest \"gurgled\" on inhale once - resolved     Gastrointestinal:  Negative for nausea and vomiting.   Genitourinary: Negative.    Musculoskeletal: Negative.    Skin: Negative.    Neurological:  Positive for headaches.     Current Medications       Current Outpatient Medications:     methylPREDNISolone 4 MG tablet therapy pack, Use as directed on package, Disp: 21 tablet, Rfl: 0    allopurinol (ZYLOPRIM) 100 mg tablet, Take 100 mg by mouth daily, " Disp: , Rfl:     aspirin 81 mg chewable tablet, Chew 1 tablet (81 mg total) daily, Disp: 60 tablet, Rfl: 0    atorvastatin (LIPITOR) 80 mg tablet, TAKE 1 TABLET BY MOUTH DAILY WITH DINNER, Disp: 90 tablet, Rfl: 1    Boswellia-Glucosamine-Vit D (Osteo Bi-Flex One Per Day) TABS, Take by mouth, Disp: , Rfl:     colchicine (COLCRYS) 0.6 mg tablet, One tid prn gout, Disp: 21 tablet, Rfl: 0    losartan (COZAAR) 25 mg tablet, Take 1 tablet (25 mg total) by mouth daily, Disp: 90 tablet, Rfl: 3    metaxalone (SKELAXIN) 800 mg tablet, Take 800 mg by mouth Three times daily as needed, Disp: , Rfl:     metoprolol tartrate (LOPRESSOR) 25 mg tablet, Take 0.5 tablets (12.5 mg total) by mouth every 12 (twelve) hours, Disp: 90 tablet, Rfl: 3    Multiple Vitamins-Minerals (MENS MULTIVITAMIN PO), , Disp: , Rfl:     nitroglycerin (NITROSTAT) 0.4 mg SL tablet, Place 1 tablet (0.4 mg total) under the tongue every 5 (five) minutes as needed for chest pain, Disp: 25 tablet, Rfl: 3    Omega-3 Fatty Acids (Fish Oil Omega-3) 1000 MG CAPS, Take 2 g by mouth daily, Disp: , Rfl:     terbinafine (LamISIL) 1 % cream, Apply topically daily for 14 days (Patient taking differently: Apply topically if needed), Disp: 14 g, Rfl: 0    ticagrelor (BRILINTA) 90 MG, Take 1 tablet (90 mg total) by mouth every 12 (twelve) hours, Disp: 60 tablet, Rfl: 3    Current Allergies     Allergies as of 03/03/2025 - Reviewed 03/03/2025   Allergen Reaction Noted    Clindamycin GI Intolerance 02/17/2015            The following portions of the patient's history were reviewed and updated as appropriate: allergies, current medications, past family history, past medical history, past social history, past surgical history and problem list.     Past Medical History:   Diagnosis Date    Erectile dysfunction     Knee pain, right     Renal calculi        Past Surgical History:   Procedure Laterality Date    CARDIAC CATHETERIZATION N/A 2/19/2024    Procedure: Cardiac pci;   Surgeon: Tl Dominique MD;  Location: [facility];  Service: Cardiology       History reviewed. No pertinent family history.    Medications have been verified.    Objective     /84   Pulse 90   Temp 98.2 °F (36.8 °C) (Tympanic)   Resp 18   Ht 6' (1.829 m)   Wt 111 kg (245 lb)   SpO2 96%   BMI 33.23 kg/m²   No LMP for male patient.     Physical Exam     Physical Exam  Vitals and nursing note reviewed.   Constitutional:       General: He is not in acute distress.     Appearance: Normal appearance. He is well-developed and normal weight. He is not ill-appearing, toxic-appearing or diaphoretic.   HENT:      Head: Normocephalic and atraumatic.      Right Ear: Tympanic membrane, ear canal and external ear normal. No drainage, swelling or tenderness. No middle ear effusion. There is no impacted cerumen. Tympanic membrane is not erythematous.      Left Ear: Tympanic membrane, ear canal and external ear normal. No drainage, swelling or tenderness.  No middle ear effusion. There is no impacted cerumen. Tympanic membrane is not erythematous.      Nose: Congestion and rhinorrhea present.      Mouth/Throat:      Mouth: Mucous membranes are moist. No oral lesions.      Pharynx: Uvula midline. Posterior oropharyngeal erythema present. No pharyngeal swelling, oropharyngeal exudate or uvula swelling.      Tonsils: No tonsillar exudate or tonsillar abscesses.   Eyes:      General: No scleral icterus.        Right eye: No discharge.         Left eye: No discharge.      Conjunctiva/sclera: Conjunctivae normal.   Neck:      Thyroid: No thyromegaly.   Cardiovascular:      Rate and Rhythm: Normal rate and regular rhythm.      Pulses: Normal pulses.      Heart sounds: Normal heart sounds. No murmur heard.     No friction rub. No gallop.   Pulmonary:      Effort: Pulmonary effort is normal. No respiratory distress.      Breath sounds: Normal breath sounds. No stridor. No wheezing, rhonchi or rales.   Chest:      Chest  wall: No tenderness.   Musculoskeletal:         General: Normal range of motion.      Cervical back: Normal range of motion and neck supple.   Lymphadenopathy:      Cervical: No cervical adenopathy.   Skin:     General: Skin is warm and dry.      Capillary Refill: Capillary refill takes less than 2 seconds.   Neurological:      General: No focal deficit present.      Mental Status: He is alert and oriented to person, place, and time.   Psychiatric:         Mood and Affect: Mood normal.         Behavior: Behavior normal.

## 2025-05-09 DIAGNOSIS — I21.3 STEMI (ST ELEVATION MYOCARDIAL INFARCTION) (HCC): ICD-10-CM

## 2025-05-09 RX ORDER — METOPROLOL TARTRATE 25 MG/1
12.5 TABLET, FILM COATED ORAL EVERY 12 HOURS SCHEDULED
Qty: 90 TABLET | Refills: 1 | Status: SHIPPED | OUTPATIENT
Start: 2025-05-09

## 2025-05-09 RX ORDER — LOSARTAN POTASSIUM 25 MG/1
25 TABLET ORAL DAILY
Qty: 90 TABLET | Refills: 1 | Status: SHIPPED | OUTPATIENT
Start: 2025-05-09

## 2025-05-09 NOTE — TELEPHONE ENCOUNTER
Medication: Losartan     Dose/Frequency: 25mg daily     Quantity: 90    Pharmacy: [facility]    Office:   [] PCP/Provider -   [x] Speciality/Provider - Dr. Baum     Does the patient have enough for 3 days?   [x] Yes   [] No - Send as HP to POD    Medication: Metoprolol     Dose/Frequency: 12.5mg BID    Quantity: 90    Pharmacy: [facility]    Office:   [] PCP/Provider -   [x] Speciality/Provider - Dr. Baum    Does the patient have enough for 3 days?   [x] Yes   [] No - Send as HP to POD

## (undated) DEVICE — GLIDESHEATH BASIC HYDROPHILIC COATED INTRODUCER SHEATH: Brand: GLIDESHEATH

## (undated) DEVICE — BALLOON NC EUPHORA 4 X 12MM

## (undated) DEVICE — CATH GUIDE LAUNCHER 6FR JR4 110CM

## (undated) DEVICE — GUIDEWIRE .035 260CM 3MM J TIP

## (undated) DEVICE — GUIDEWIRE WHOLEY .035 145 CM FLOP STR TIP

## (undated) DEVICE — CATH THROMBECTOMY EXPORT 6FR 140CM

## (undated) DEVICE — TREK CORONARY DILATATION CATHETER 2.50 MM X 15 MM / RAPID-EXCHANGE: Brand: TREK

## (undated) DEVICE — CATH DIAG 5FR IMPULSE 100CM FL3.5

## (undated) DEVICE — RUNTHROUGH NS EXTRA FLOPPY PTCA GUIDEWIRE: Brand: RUNTHROUGH